# Patient Record
Sex: FEMALE | Race: WHITE | NOT HISPANIC OR LATINO | Employment: OTHER | RURAL
[De-identification: names, ages, dates, MRNs, and addresses within clinical notes are randomized per-mention and may not be internally consistent; named-entity substitution may affect disease eponyms.]

---

## 2017-01-16 ENCOUNTER — CONVERSION ENCOUNTER (OUTPATIENT)
Dept: FAMILY MEDICINE CLINIC | Facility: CLINIC | Age: 72
End: 2017-01-16

## 2017-01-23 ENCOUNTER — HOSPITAL ENCOUNTER (OUTPATIENT)
Dept: OTHER | Facility: HOSPITAL | Age: 72
Discharge: HOME | End: 2017-01-23
Attending: NURSE PRACTITIONER | Admitting: NURSE PRACTITIONER

## 2017-02-10 ENCOUNTER — HOSPITAL ENCOUNTER (OUTPATIENT)
Dept: OTHER | Facility: HOSPITAL | Age: 72
Discharge: HOME | End: 2017-02-10
Attending: NURSE PRACTITIONER | Admitting: NURSE PRACTITIONER

## 2017-02-13 ENCOUNTER — CONVERSION ENCOUNTER (OUTPATIENT)
Dept: FAMILY MEDICINE CLINIC | Facility: CLINIC | Age: 72
End: 2017-02-13

## 2017-03-08 ENCOUNTER — OFFICE (OUTPATIENT)
Dept: RURAL CLINIC 3 | Facility: CLINIC | Age: 72
End: 2017-03-08

## 2017-03-08 VITALS
HEIGHT: 62 IN | DIASTOLIC BLOOD PRESSURE: 73 MMHG | HEART RATE: 88 BPM | WEIGHT: 138 LBS | SYSTOLIC BLOOD PRESSURE: 136 MMHG

## 2017-03-08 DIAGNOSIS — K21.9 GASTRO-ESOPHAGEAL REFLUX DISEASE WITHOUT ESOPHAGITIS: ICD-10-CM

## 2017-03-08 DIAGNOSIS — R13.10 DYSPHAGIA, UNSPECIFIED: ICD-10-CM

## 2017-03-08 PROCEDURE — 99202 OFFICE O/P NEW SF 15 MIN: CPT | Performed by: INTERNAL MEDICINE

## 2017-03-08 RX ORDER — RANITIDINE 300 MG/1
300 TABLET ORAL
Qty: 90 | Refills: 4 | Status: COMPLETED
Start: 2017-03-08 | End: 2019-03-20 | Stop reason: ALTCHOICE

## 2017-03-08 RX ORDER — PANTOPRAZOLE SODIUM 40 MG/1
TABLET, DELAYED RELEASE ORAL
Qty: 90 | Refills: 4 | Status: COMPLETED
End: 2019-03-20 | Stop reason: ALTCHOICE

## 2017-03-08 RX ORDER — DEXLANSOPRAZOLE 60 MG/1
CAPSULE, DELAYED RELEASE ORAL
Qty: 30 | Refills: 0 | Status: COMPLETED
End: 2017-03-08

## 2017-04-19 ENCOUNTER — ON CAMPUS - OUTPATIENT (OUTPATIENT)
Dept: RURAL HOSPITAL 3 | Facility: HOSPITAL | Age: 72
End: 2017-04-19

## 2017-04-19 DIAGNOSIS — R13.10 DYSPHAGIA, UNSPECIFIED: ICD-10-CM

## 2017-04-19 DIAGNOSIS — K22.2 ESOPHAGEAL OBSTRUCTION: ICD-10-CM

## 2017-04-19 DIAGNOSIS — K21.9 GASTRO-ESOPHAGEAL REFLUX DISEASE WITHOUT ESOPHAGITIS: ICD-10-CM

## 2017-04-19 PROCEDURE — 43450 DILATE ESOPHAGUS 1/MULT PASS: CPT | Performed by: INTERNAL MEDICINE

## 2017-04-19 PROCEDURE — 43235 EGD DIAGNOSTIC BRUSH WASH: CPT | Performed by: INTERNAL MEDICINE

## 2017-09-18 ENCOUNTER — HOSPITAL ENCOUNTER (OUTPATIENT)
Dept: PHYSICAL THERAPY | Facility: HOSPITAL | Age: 72
Setting detail: RECURRING SERIES
Discharge: HOME | End: 2017-10-03
Attending: ORTHOPAEDIC SURGERY | Admitting: ORTHOPAEDIC SURGERY

## 2017-10-23 ENCOUNTER — CONVERSION ENCOUNTER (OUTPATIENT)
Dept: FAMILY MEDICINE CLINIC | Facility: CLINIC | Age: 72
End: 2017-10-23

## 2017-10-24 ENCOUNTER — HOSPITAL ENCOUNTER (OUTPATIENT)
Dept: OTHER | Facility: HOSPITAL | Age: 72
Discharge: HOME | End: 2017-10-24
Attending: ORTHOPAEDIC SURGERY | Admitting: ORTHOPAEDIC SURGERY

## 2017-12-22 ENCOUNTER — CONVERSION ENCOUNTER (OUTPATIENT)
Dept: FAMILY MEDICINE CLINIC | Facility: CLINIC | Age: 72
End: 2017-12-22

## 2018-01-08 ENCOUNTER — HOSPITAL ENCOUNTER (OUTPATIENT)
Dept: OTHER | Facility: HOSPITAL | Age: 73
Discharge: HOME | End: 2018-01-08
Attending: NURSE PRACTITIONER | Admitting: NURSE PRACTITIONER

## 2018-01-08 ENCOUNTER — CONVERSION ENCOUNTER (OUTPATIENT)
Dept: FAMILY MEDICINE CLINIC | Facility: CLINIC | Age: 73
End: 2018-01-08

## 2018-01-15 ENCOUNTER — CONVERSION ENCOUNTER (OUTPATIENT)
Dept: FAMILY MEDICINE CLINIC | Facility: CLINIC | Age: 73
End: 2018-01-15

## 2018-01-19 ENCOUNTER — CONVERSION ENCOUNTER (OUTPATIENT)
Dept: FAMILY MEDICINE CLINIC | Facility: CLINIC | Age: 73
End: 2018-01-19

## 2018-02-12 ENCOUNTER — CONVERSION ENCOUNTER (OUTPATIENT)
Dept: FAMILY MEDICINE CLINIC | Facility: CLINIC | Age: 73
End: 2018-02-12

## 2018-02-12 ENCOUNTER — HOSPITAL ENCOUNTER (OUTPATIENT)
Dept: OTHER | Facility: HOSPITAL | Age: 73
Discharge: HOME | End: 2018-02-12
Attending: NURSE PRACTITIONER | Admitting: NURSE PRACTITIONER

## 2018-02-14 ENCOUNTER — OFFICE (OUTPATIENT)
Dept: RURAL CLINIC 3 | Facility: CLINIC | Age: 73
End: 2018-02-14

## 2018-02-14 VITALS
DIASTOLIC BLOOD PRESSURE: 80 MMHG | SYSTOLIC BLOOD PRESSURE: 160 MMHG | HEIGHT: 62 IN | WEIGHT: 134 LBS | HEART RATE: 78 BPM

## 2018-02-14 DIAGNOSIS — K21.9 GASTRO-ESOPHAGEAL REFLUX DISEASE WITHOUT ESOPHAGITIS: ICD-10-CM

## 2018-02-14 PROCEDURE — 99213 OFFICE O/P EST LOW 20 MIN: CPT | Performed by: INTERNAL MEDICINE

## 2018-02-14 RX ORDER — PANTOPRAZOLE SODIUM 40 MG/1
TABLET, DELAYED RELEASE ORAL
Qty: 90 | Refills: 4 | Status: COMPLETED
End: 2019-03-20 | Stop reason: ALTCHOICE

## 2018-02-14 RX ORDER — RANITIDINE 300 MG/1
300 TABLET ORAL
Qty: 90 | Refills: 4 | Status: COMPLETED
Start: 2017-03-08 | End: 2019-03-20 | Stop reason: ALTCHOICE

## 2018-02-22 ENCOUNTER — HOSPITAL ENCOUNTER (OUTPATIENT)
Dept: OTHER | Facility: HOSPITAL | Age: 73
Discharge: HOME | End: 2018-02-22
Attending: NURSE PRACTITIONER | Admitting: NURSE PRACTITIONER

## 2018-04-19 ENCOUNTER — HOSPITAL ENCOUNTER (OUTPATIENT)
Dept: GENERAL RADIOLOGY | Facility: HOSPITAL | Age: 73
Discharge: HOME | End: 2018-04-19
Attending: NURSE PRACTITIONER | Admitting: NURSE PRACTITIONER

## 2018-08-21 ENCOUNTER — HOSPITAL ENCOUNTER (OUTPATIENT)
Dept: GENERAL RADIOLOGY | Facility: HOSPITAL | Age: 73
Discharge: HOME | End: 2018-08-21
Attending: ORTHOPAEDIC SURGERY | Admitting: ORTHOPAEDIC SURGERY

## 2018-08-24 ENCOUNTER — CONVERSION ENCOUNTER (OUTPATIENT)
Dept: FAMILY MEDICINE CLINIC | Facility: CLINIC | Age: 73
End: 2018-08-24

## 2018-08-30 ENCOUNTER — CONVERSION ENCOUNTER (OUTPATIENT)
Dept: FAMILY MEDICINE CLINIC | Facility: CLINIC | Age: 73
End: 2018-08-30

## 2018-09-25 ENCOUNTER — CONVERSION ENCOUNTER (OUTPATIENT)
Dept: FAMILY MEDICINE CLINIC | Facility: CLINIC | Age: 73
End: 2018-09-25

## 2018-10-17 ENCOUNTER — CONVERSION ENCOUNTER (OUTPATIENT)
Dept: FAMILY MEDICINE CLINIC | Facility: CLINIC | Age: 73
End: 2018-10-17

## 2018-12-24 ENCOUNTER — HOSPITAL ENCOUNTER (OUTPATIENT)
Dept: OTHER | Facility: HOSPITAL | Age: 73
Discharge: HOME | End: 2018-12-24
Attending: NURSE PRACTITIONER | Admitting: NURSE PRACTITIONER

## 2018-12-24 ENCOUNTER — CONVERSION ENCOUNTER (OUTPATIENT)
Dept: FAMILY MEDICINE CLINIC | Facility: CLINIC | Age: 73
End: 2018-12-24

## 2019-01-07 ENCOUNTER — CONVERSION ENCOUNTER (OUTPATIENT)
Dept: FAMILY MEDICINE CLINIC | Facility: CLINIC | Age: 74
End: 2019-01-07

## 2019-02-07 ENCOUNTER — HOSPITAL ENCOUNTER (OUTPATIENT)
Dept: OTHER | Facility: HOSPITAL | Age: 74
Discharge: HOME | End: 2019-02-07
Attending: NURSE PRACTITIONER | Admitting: NURSE PRACTITIONER

## 2019-02-07 LAB — CREAT BLDA-MCNC: 0.9 MG/DL (ref 0.6–1.3)

## 2019-02-14 ENCOUNTER — CONVERSION ENCOUNTER (OUTPATIENT)
Dept: FAMILY MEDICINE CLINIC | Facility: CLINIC | Age: 74
End: 2019-02-14

## 2019-02-14 ENCOUNTER — HOSPITAL ENCOUNTER (OUTPATIENT)
Dept: OTHER | Facility: HOSPITAL | Age: 74
Discharge: HOME | End: 2019-02-14
Attending: NURSE PRACTITIONER | Admitting: NURSE PRACTITIONER

## 2019-03-13 ENCOUNTER — OFFICE (OUTPATIENT)
Dept: RURAL CLINIC 3 | Facility: CLINIC | Age: 74
End: 2019-03-13

## 2019-03-13 VITALS
DIASTOLIC BLOOD PRESSURE: 65 MMHG | WEIGHT: 132 LBS | SYSTOLIC BLOOD PRESSURE: 114 MMHG | HEIGHT: 62 IN | HEART RATE: 79 BPM

## 2019-03-13 DIAGNOSIS — K21.9 GASTRO-ESOPHAGEAL REFLUX DISEASE WITHOUT ESOPHAGITIS: ICD-10-CM

## 2019-03-13 DIAGNOSIS — R13.10 DYSPHAGIA, UNSPECIFIED: ICD-10-CM

## 2019-03-13 PROCEDURE — 99214 OFFICE O/P EST MOD 30 MIN: CPT | Performed by: NURSE PRACTITIONER

## 2019-03-21 ENCOUNTER — CONVERSION ENCOUNTER (OUTPATIENT)
Dept: FAMILY MEDICINE CLINIC | Facility: CLINIC | Age: 74
End: 2019-03-21

## 2019-03-28 ENCOUNTER — HOSPITAL ENCOUNTER (OUTPATIENT)
Dept: RESPIRATORY THERAPY | Facility: HOSPITAL | Age: 74
Discharge: HOME | End: 2019-03-28
Attending: INTERNAL MEDICINE | Admitting: INTERNAL MEDICINE

## 2019-04-12 ENCOUNTER — HOSPITAL ENCOUNTER (OUTPATIENT)
Dept: RESPIRATORY THERAPY | Facility: HOSPITAL | Age: 74
Discharge: HOME | End: 2019-04-12
Attending: INTERNAL MEDICINE | Admitting: INTERNAL MEDICINE

## 2019-04-29 ENCOUNTER — HOSPITAL ENCOUNTER (OUTPATIENT)
Dept: RESPIRATORY THERAPY | Facility: HOSPITAL | Age: 74
Discharge: HOME | End: 2019-04-29
Attending: NURSE PRACTITIONER | Admitting: NURSE PRACTITIONER

## 2019-05-29 ENCOUNTER — ON CAMPUS - OUTPATIENT (OUTPATIENT)
Dept: RURAL HOSPITAL 3 | Facility: HOSPITAL | Age: 74
End: 2019-05-29

## 2019-05-29 DIAGNOSIS — K22.2 ESOPHAGEAL OBSTRUCTION: ICD-10-CM

## 2019-05-29 DIAGNOSIS — R13.10 DYSPHAGIA, UNSPECIFIED: ICD-10-CM

## 2019-05-29 PROCEDURE — 43235 EGD DIAGNOSTIC BRUSH WASH: CPT | Performed by: INTERNAL MEDICINE

## 2019-05-29 PROCEDURE — 43450 DILATE ESOPHAGUS 1/MULT PASS: CPT | Performed by: INTERNAL MEDICINE

## 2019-06-04 VITALS
DIASTOLIC BLOOD PRESSURE: 62 MMHG | OXYGEN SATURATION: 97 % | WEIGHT: 137.38 LBS | HEART RATE: 84 BPM | WEIGHT: 140.13 LBS | OXYGEN SATURATION: 97 % | SYSTOLIC BLOOD PRESSURE: 143 MMHG | DIASTOLIC BLOOD PRESSURE: 67 MMHG | RESPIRATION RATE: 16 BRPM | OXYGEN SATURATION: 99 % | WEIGHT: 135 LBS | WEIGHT: 134.2 LBS | HEIGHT: 63 IN | SYSTOLIC BLOOD PRESSURE: 138 MMHG | RESPIRATION RATE: 16 BRPM | WEIGHT: 141.2 LBS | WEIGHT: 139.5 LBS | BODY MASS INDEX: 23.78 KG/M2 | OXYGEN SATURATION: 99 % | BODY MASS INDEX: 24.8 KG/M2 | SYSTOLIC BLOOD PRESSURE: 145 MMHG | HEART RATE: 78 BPM | DIASTOLIC BLOOD PRESSURE: 80 MMHG | SYSTOLIC BLOOD PRESSURE: 125 MMHG | BODY MASS INDEX: 24.34 KG/M2 | BODY MASS INDEX: 24.98 KG/M2 | RESPIRATION RATE: 18 BRPM | BODY MASS INDEX: 23.92 KG/M2 | HEART RATE: 95 BPM | HEIGHT: 63 IN | HEIGHT: 63 IN | RESPIRATION RATE: 16 BRPM | DIASTOLIC BLOOD PRESSURE: 78 MMHG | DIASTOLIC BLOOD PRESSURE: 82 MMHG | HEART RATE: 96 BPM | BODY MASS INDEX: 24.77 KG/M2 | BODY MASS INDEX: 25.05 KG/M2 | RESPIRATION RATE: 18 BRPM | HEIGHT: 63 IN | SYSTOLIC BLOOD PRESSURE: 141 MMHG | OXYGEN SATURATION: 95 % | SYSTOLIC BLOOD PRESSURE: 113 MMHG | SYSTOLIC BLOOD PRESSURE: 138 MMHG | RESPIRATION RATE: 16 BRPM | HEIGHT: 63 IN | WEIGHT: 139 LBS | RESPIRATION RATE: 18 BRPM | HEART RATE: 90 BPM | SYSTOLIC BLOOD PRESSURE: 148 MMHG | OXYGEN SATURATION: 95 % | HEIGHT: 63 IN | HEART RATE: 100 BPM | HEIGHT: 63 IN | OXYGEN SATURATION: 98 % | HEART RATE: 88 BPM | SYSTOLIC BLOOD PRESSURE: 126 MMHG | WEIGHT: 141 LBS | RESPIRATION RATE: 18 BRPM | SYSTOLIC BLOOD PRESSURE: 136 MMHG | BODY MASS INDEX: 25.16 KG/M2 | DIASTOLIC BLOOD PRESSURE: 79 MMHG | HEART RATE: 88 BPM | WEIGHT: 139.8 LBS | HEART RATE: 80 BPM | RESPIRATION RATE: 16 BRPM | SYSTOLIC BLOOD PRESSURE: 134 MMHG | RESPIRATION RATE: 16 BRPM | RESPIRATION RATE: 16 BRPM | OXYGEN SATURATION: 100 % | BODY MASS INDEX: 24.82 KG/M2 | RESPIRATION RATE: 16 BRPM | HEIGHT: 63 IN | BODY MASS INDEX: 25.02 KG/M2 | OXYGEN SATURATION: 95 % | WEIGHT: 141.4 LBS | DIASTOLIC BLOOD PRESSURE: 85 MMHG | DIASTOLIC BLOOD PRESSURE: 86 MMHG | DIASTOLIC BLOOD PRESSURE: 77 MMHG | WEIGHT: 134.2 LBS | WEIGHT: 140 LBS | BODY MASS INDEX: 24.63 KG/M2 | DIASTOLIC BLOOD PRESSURE: 84 MMHG | HEART RATE: 77 BPM | OXYGEN SATURATION: 98 % | OXYGEN SATURATION: 99 % | HEART RATE: 77 BPM | SYSTOLIC BLOOD PRESSURE: 98 MMHG | HEART RATE: 75 BPM | OXYGEN SATURATION: 98 % | HEIGHT: 63 IN | WEIGHT: 142 LBS | DIASTOLIC BLOOD PRESSURE: 73 MMHG | SYSTOLIC BLOOD PRESSURE: 139 MMHG | BODY MASS INDEX: 24.71 KG/M2 | BODY MASS INDEX: 23.78 KG/M2 | DIASTOLIC BLOOD PRESSURE: 82 MMHG | HEIGHT: 63 IN | DIASTOLIC BLOOD PRESSURE: 76 MMHG | DIASTOLIC BLOOD PRESSURE: 69 MMHG | HEART RATE: 77 BPM | RESPIRATION RATE: 16 BRPM | OXYGEN SATURATION: 98 % | SYSTOLIC BLOOD PRESSURE: 96 MMHG | HEIGHT: 63 IN

## 2019-09-19 PROBLEM — I65.29 CAROTID ARTERY STENOSIS: Status: ACTIVE | Noted: 2017-01-23

## 2019-09-19 PROBLEM — J45.909 ASTHMA: Status: ACTIVE | Noted: 2019-09-19

## 2019-09-19 PROBLEM — M15.9 GENERALIZED OSTEOARTHRITIS: Status: ACTIVE | Noted: 2019-09-19

## 2019-09-19 PROBLEM — N81.10 FEMALE BLADDER PROLAPSE: Status: ACTIVE | Noted: 2019-02-14

## 2019-09-19 PROBLEM — E78.5 HYPERLIPIDEMIA: Status: ACTIVE | Noted: 2019-09-19

## 2019-09-19 PROBLEM — E53.8 VITAMIN B12 DEFICIENCY: Status: ACTIVE | Noted: 2019-02-08

## 2019-09-19 PROBLEM — I10 BENIGN HYPERTENSION: Status: ACTIVE | Noted: 2019-09-19

## 2019-09-19 PROBLEM — J30.9 ALLERGIC RHINITIS: Status: ACTIVE | Noted: 2019-09-19

## 2019-09-19 PROBLEM — G47.00 INSOMNIA: Status: ACTIVE | Noted: 2019-02-14

## 2019-09-19 PROBLEM — K21.9 GASTROESOPHAGEAL REFLUX DISEASE: Status: ACTIVE | Noted: 2019-09-19

## 2019-09-19 PROBLEM — N39.3 STRESS INCONTINENCE, FEMALE: Status: ACTIVE | Noted: 2019-02-14

## 2019-09-19 PROBLEM — Z79.899 OTHER LONG TERM (CURRENT) DRUG THERAPY: Status: ACTIVE | Noted: 2019-01-29

## 2019-09-19 PROBLEM — M85.80 OSTEOPENIA: Status: ACTIVE | Noted: 2019-09-19

## 2019-09-19 PROBLEM — M19.90 OSTEOARTHRITIS: Status: ACTIVE | Noted: 2019-09-19

## 2019-09-19 RX ORDER — VALSARTAN 80 MG/1
TABLET ORAL DAILY
COMMUNITY
Start: 2013-10-30 | End: 2019-11-27 | Stop reason: ALTCHOICE

## 2019-09-19 RX ORDER — HYDROCHLOROTHIAZIDE 25 MG/1
TABLET ORAL EVERY 24 HOURS
COMMUNITY
Start: 2013-09-06 | End: 2019-09-24 | Stop reason: SDUPTHER

## 2019-09-19 RX ORDER — LOSARTAN POTASSIUM 100 MG/1
TABLET ORAL
COMMUNITY
Start: 2018-10-23 | End: 2019-12-10 | Stop reason: SDUPTHER

## 2019-09-19 RX ORDER — FEXOFENADINE HCL 180 MG/1
TABLET ORAL
COMMUNITY
Start: 2018-02-12 | End: 2019-11-27 | Stop reason: ALTCHOICE

## 2019-09-19 RX ORDER — ACETAMINOPHEN 325 MG/1
TABLET ORAL
COMMUNITY

## 2019-09-19 RX ORDER — PHENOL 1.4 %
AEROSOL, SPRAY (ML) MUCOUS MEMBRANE DAILY
COMMUNITY
Start: 2013-01-21 | End: 2020-02-20

## 2019-09-19 RX ORDER — LEVALBUTEROL INHALATION SOLUTION 1.25 MG/3ML
SOLUTION RESPIRATORY (INHALATION)
COMMUNITY
Start: 2013-06-06 | End: 2020-02-20

## 2019-09-19 RX ORDER — TRIAMCINOLONE ACETONIDE 55 UG/1
SPRAY, METERED NASAL
COMMUNITY
Start: 2019-02-14 | End: 2020-02-20

## 2019-09-19 RX ORDER — RISEDRONATE SODIUM 150 MG/1
TABLET, FILM COATED ORAL
COMMUNITY
Start: 2018-12-05 | End: 2020-02-12

## 2019-09-19 RX ORDER — GUAIFENESIN 600 MG/1
1200 TABLET, EXTENDED RELEASE ORAL 2 TIMES DAILY
COMMUNITY
Start: 2013-01-21

## 2019-09-19 RX ORDER — FAMCICLOVIR 500 MG/1
TABLET ORAL
COMMUNITY
Start: 2015-01-20 | End: 2019-11-27

## 2019-09-19 RX ORDER — LANOLIN ALCOHOL/MO/W.PET/CERES
CREAM (GRAM) TOPICAL EVERY 24 HOURS
COMMUNITY
Start: 2019-02-14 | End: 2021-04-14

## 2019-09-19 RX ORDER — MULTIVIT WITH MINERALS/LUTEIN
TABLET ORAL
COMMUNITY
Start: 2018-02-12

## 2019-09-19 RX ORDER — LEVALBUTEROL TARTRATE 45 UG/1
AEROSOL, METERED ORAL
COMMUNITY
Start: 2013-06-06

## 2019-09-19 RX ORDER — MONTELUKAST SODIUM 10 MG/1
TABLET ORAL EVERY 24 HOURS
COMMUNITY
Start: 2019-02-14 | End: 2020-02-20 | Stop reason: SDUPTHER

## 2019-09-19 RX ORDER — SIMVASTATIN 40 MG
TABLET ORAL
COMMUNITY
Start: 2013-01-21 | End: 2019-12-11 | Stop reason: SDUPTHER

## 2019-09-19 RX ORDER — LEVOCETIRIZINE DIHYDROCHLORIDE 5 MG/1
TABLET, FILM COATED ORAL EVERY 24 HOURS
COMMUNITY
Start: 2019-02-14 | End: 2020-10-22

## 2019-09-20 ENCOUNTER — OFFICE VISIT (OUTPATIENT)
Dept: FAMILY MEDICINE CLINIC | Facility: CLINIC | Age: 74
End: 2019-09-20

## 2019-09-20 VITALS
SYSTOLIC BLOOD PRESSURE: 124 MMHG | WEIGHT: 144.4 LBS | TEMPERATURE: 98.5 F | BODY MASS INDEX: 26.57 KG/M2 | HEIGHT: 62 IN | DIASTOLIC BLOOD PRESSURE: 68 MMHG | HEART RATE: 74 BPM | RESPIRATION RATE: 16 BRPM | OXYGEN SATURATION: 97 %

## 2019-09-20 DIAGNOSIS — I10 BENIGN HYPERTENSION: Primary | ICD-10-CM

## 2019-09-20 PROCEDURE — 99212 OFFICE O/P EST SF 10 MIN: CPT | Performed by: NURSE PRACTITIONER

## 2019-09-20 RX ORDER — TIOTROPIUM BROMIDE INHALATION SPRAY 1.56 UG/1
SPRAY, METERED RESPIRATORY (INHALATION)
COMMUNITY
Start: 2019-09-10 | End: 2020-04-03 | Stop reason: SDUPTHER

## 2019-09-20 RX ORDER — RANITIDINE 300 MG/1
TABLET ORAL
COMMUNITY
Start: 2019-08-21 | End: 2019-11-27

## 2019-09-20 RX ORDER — AZITHROMYCIN 500 MG/1
TABLET, FILM COATED ORAL
COMMUNITY
Start: 2019-08-30 | End: 2019-11-27

## 2019-09-20 NOTE — PROGRESS NOTES
Chief Complaint   Patient presents with   • Hypertension        Subjective     Cara Castañeda  has a past medical history of Allergic rhinitis, Asthma, Carotid stenosis, Cold sore, Gallstone pancreatitis (2006), GERD (gastroesophageal reflux disease), Hiatal hernia, Hyperlipidemia, Hypertension, Left ventricular hypertrophy, Osteoarthritis, Osteopenia with high risk of fracture, Pre-diabetes, and Vocal cord nodule.    Hypertension   This is a chronic problem. The current episode started more than 1 year ago. The problem has been gradually improving since onset. The problem is controlled. Pertinent negatives include no blurred vision, chest pain, palpitations or shortness of breath. Current antihypertension treatment includes angiotensin blockers and diuretics. The current treatment provides significant improvement.       PHQ-2 Depression Screening  Little interest or pleasure in doing things? 0   Feeling down, depressed, or hopeless? 0   PHQ-2 Total Score 0       Allergies   Allergen Reactions   • Aspirin Unknown (See Comments)   • Sulfa Antibiotics Unknown (See Comments)   • Tolterodine Nausea Only   • Albuterol Sulfate Unknown (See Comments)   • Atorvastatin Calcium Nausea Only       Current Outpatient Medications:   •  acetaminophen (TYLENOL) 325 MG tablet, Take  by mouth., Disp: , Rfl:   •  azithromycin (ZITHROMAX) 500 MG tablet, , Disp: , Rfl:   •  Calcium Carbonate-Vitamin D3 (CALCIUM 600-D) 600-400 MG-UNIT tablet, CALCIUM 600-D 600-400 MG-UNIT TABS, Disp: , Rfl:   •  Dexlansoprazole (DEXILANT PO), DEXILANT CPDR, Disp: , Rfl:   •  fexofenadine (ALLEGRA) 180 MG tablet, MUCINEX ALLERGY TABLET, Disp: , Rfl:   •  hydrochlorothiazide (HYDRODIURIL) 25 MG tablet, Daily., Disp: , Rfl:   •  levalbuterol (XOPENEX HFA) 45 MCG/ACT inhaler, XOPENEX HFA 45 MCG/ACT AERO, Disp: , Rfl:   •  levalbuterol (XOPENEX) 1.25 MG/3ML nebulizer solution, XOPENEX 1.25 MG/3ML NEBU, Disp: , Rfl:   •  levocetirizine (XYZAL ALLERGY 24HR) 5  MG tablet, Daily., Disp: , Rfl:   •  losartan (COZAAR) 100 MG tablet, LOSARTAN POTASSIUM 100 MG TABS, Disp: , Rfl:   •  mometasone-formoterol (DULERA) 100-5 MCG/ACT inhaler, DULERA 100-5 MCG/ACT AERO, Disp: , Rfl:   •  montelukast (SINGULAIR) 10 MG tablet, Daily., Disp: , Rfl:   •  Multiple Vitamins-Minerals (CENTRUM SILVER) tablet, CENTRUM SILVER TABS, Disp: , Rfl:   •  raNITIdine (ZANTAC) 300 MG tablet, , Disp: , Rfl:   •  risedronate (ACTONEL) 150 MG tablet, ACTONEL 150 MG TABS, Disp: , Rfl:   •  simvastatin (ZOCOR) 40 MG tablet, SIMVASTATIN 40 MG TABS, Disp: , Rfl:   •  SPIRIVA RESPIMAT 1.25 MCG/ACT aerosol solution inhaler, , Disp: , Rfl:   •  Triamcinolone Acetonide (NASACORT ALLERGY 24HR) 55 MCG/ACT nasal inhaler, NASACORT ALLERGY 24HR 55 MCG/ACT AERO, Disp: , Rfl:   •  valsartan (DIOVAN) 80 MG tablet, Take  by mouth Daily., Disp: , Rfl:   •  vitamin B-12 (CYANOCOBALAMIN) 1000 MCG tablet, Daily., Disp: , Rfl:   •  calcium carbonate (CALCIUM 600) 600 MG tablet, Take  by mouth Daily., Disp: , Rfl:   •  famciclovir (FAMVIR) 500 MG tablet, FAMCICLOVIR 500 MG TABS, Disp: , Rfl:   •  guaiFENesin (MUCINEX) 600 MG 12 hr tablet, Take  by mouth., Disp: , Rfl:   •  Pediatric Multivitamins-Fl (MULTIVITAMINS/FL PO), Take  by mouth Daily., Disp: , Rfl:     Past Medical History:   Diagnosis Date   • Allergic rhinitis     on immunotherapy- Dr. Benito    • Asthma     Dr. Benito   • Carotid stenosis     <50%   • Cold sore    • Gallstone pancreatitis 2006   • GERD (gastroesophageal reflux disease)     Dr. Oliva   • Hiatal hernia     small, sliding    • Hyperlipidemia    • Hypertension    • Left ventricular hypertrophy    • Osteoarthritis     Dr. Avila   • Osteopenia with high risk of fracture    • Pre-diabetes    • Vocal cord nodule     Dr. Nieves       Past Surgical History:   Procedure Laterality Date   • APPENDECTOMY     • CHOLECYSTECTOMY  05/2006    Dr. Hernandez   • ENDOSCOPY  2017    & Dilation. Dr. lOiva   • ERCP   05/2006    Dr. Oliva   • HYSTERECTOMY  1974    ovaries remain, for benign reasons   • OTHER SURGICAL HISTORY  2009    Stress Echo. No ischemia. EF 60-65%.    • TONSILLECTOMY AND ADENOIDECTOMY     • TOTAL KNEE ARTHROPLASTY  08/31/2017       Social History     Socioeconomic History   • Marital status:      Spouse name: Not on file   • Number of children: Not on file   • Years of education: Not on file   • Highest education level: Not on file   Tobacco Use   • Smoking status: Never Smoker   • Smokeless tobacco: Never Used   Substance and Sexual Activity   • Alcohol use: No     Frequency: Never   • Drug use: No       Family History   Problem Relation Age of Onset   • Hypertension Mother    • Hypertension Father    • Heart disease Father    • Prostate cancer Paternal Grandfather        Family history, surgical history, past medical history, Allergies and med's reviewed with patient today and updated in [employer] EMR.     ROS:  Review of Systems   Constitutional: Negative for activity change, appetite change, diaphoresis, fatigue, unexpected weight gain and unexpected weight loss.   Eyes: Negative for blurred vision and visual disturbance.   Respiratory: Negative for cough, shortness of breath and wheezing.    Cardiovascular: Negative for chest pain, palpitations and leg swelling.   Gastrointestinal: Negative for abdominal pain, constipation, diarrhea, nausea and vomiting.   Endocrine: Negative for cold intolerance and heat intolerance.   Genitourinary: Negative for frequency.   Musculoskeletal: Negative for myalgias.   Neurological: Negative for dizziness, syncope and headache.   Psychiatric/Behavioral: Negative for depressed mood.       OBJECTIVE:  Vitals:    09/20/19 0954 09/20/19 1015   BP: 148/75 124/68   BP Location: Right arm    Patient Position: Sitting    Cuff Size: Adult    Pulse: 74    Resp: 16    Temp: 98.5 °F (36.9 °C)    TempSrc: Oral    SpO2: 97%    Weight: 65.5 kg (144 lb 6.4 oz)    Height: 157.5 cm  "(62\")      Body mass index is 26.41 kg/m².    Physical Exam   Constitutional: She is oriented to person, place, and time. She appears well-developed and well-nourished.   Neck: Trachea normal and normal range of motion. Neck supple. Carotid bruit is not present. No thyromegaly present.   Cardiovascular: Normal rate, regular rhythm, normal heart sounds and intact distal pulses. Exam reveals no gallop and no friction rub.   No murmur heard.  Pulmonary/Chest: Effort normal and breath sounds normal. She has no wheezes. She has no rales.   Abdominal: Soft. Bowel sounds are normal. There is no hepatosplenomegaly. No hernia.   Musculoskeletal: Normal range of motion. She exhibits no edema.   Lymphadenopathy:     She has no cervical adenopathy.   Neurological: She is alert and oriented to person, place, and time.   Skin: Skin is warm and dry.   Psychiatric: She has a normal mood and affect. Her behavior is normal. Judgment and thought content normal.       No visits with results within 30 Day(s) from this visit.   Latest known visit with results is:   Hospital Outpatient Visit on 02/07/2019   Component Date Value Ref Range Status   • Creatinine, Arterial 02/07/2019 0.9  0.6 - 1.3 mg/dL Final   • GFR MDRD Non  02/07/2019 >60  >60 mL/min/1.73m2 Final   • eGFR African Am 02/07/2019 >60  >60 mL/min/1.73m2 Final       ASSESSMENT/ PLAN:    Diagnoses and all orders for this visit:    1. Benign hypertension (Primary)  Comments:  Stable. Follow-up in February for annual exam.         Orders Placed Today:     No orders of the defined types were placed in this encounter.       Management Plan:     An After Visit Summary was printed and given to the patient at discharge.    Follow-up: Return in about 5 months (around 2/17/2020) for Medicare Wellness.    AGNES Reilly 9/22/2019 11:30 AM  This note was electronically signed.  "

## 2019-09-24 RX ORDER — HYDROCHLOROTHIAZIDE 25 MG/1
TABLET ORAL
Qty: 90 TABLET | Refills: 1 | Status: SHIPPED | OUTPATIENT
Start: 2019-09-24 | End: 2020-03-27

## 2019-10-17 ENCOUNTER — FLU SHOT (OUTPATIENT)
Dept: FAMILY MEDICINE CLINIC | Facility: CLINIC | Age: 74
End: 2019-10-17

## 2019-10-17 DIAGNOSIS — Z23 IMMUNIZATION, PNEUMOCOCCUS AND INFLUENZA: ICD-10-CM

## 2019-10-17 PROCEDURE — G0008 ADMIN INFLUENZA VIRUS VAC: HCPCS | Performed by: NURSE PRACTITIONER

## 2019-10-17 PROCEDURE — 90653 IIV ADJUVANT VACCINE IM: CPT | Performed by: NURSE PRACTITIONER

## 2019-11-08 DIAGNOSIS — R73.02 IMPAIRED GLUCOSE TOLERANCE: ICD-10-CM

## 2019-11-08 DIAGNOSIS — E78.5 HYPERLIPIDEMIA, UNSPECIFIED HYPERLIPIDEMIA TYPE: ICD-10-CM

## 2019-11-08 DIAGNOSIS — I10 BENIGN HYPERTENSION: Primary | ICD-10-CM

## 2019-11-14 LAB
ALBUMIN SERPL-MCNC: 4.5 G/DL (ref 3.5–4.8)
ALBUMIN/GLOB SERPL: 1.9 {RATIO} (ref 1.2–2.2)
ALP SERPL-CCNC: 65 IU/L (ref 39–117)
ALT SERPL-CCNC: 13 IU/L (ref 0–32)
AST SERPL-CCNC: 17 IU/L (ref 0–40)
BASOPHILS # BLD AUTO: 0.1 X10E3/UL (ref 0–0.2)
BASOPHILS NFR BLD AUTO: 1 %
BILIRUB SERPL-MCNC: 0.3 MG/DL (ref 0–1.2)
BUN SERPL-MCNC: 12 MG/DL (ref 8–27)
BUN/CREAT SERPL: 14 (ref 12–28)
CALCIUM SERPL-MCNC: 10.2 MG/DL (ref 8.7–10.3)
CHLORIDE SERPL-SCNC: 99 MMOL/L (ref 96–106)
CO2 SERPL-SCNC: 25 MMOL/L (ref 20–29)
CREAT SERPL-MCNC: 0.83 MG/DL (ref 0.57–1)
EOSINOPHIL # BLD AUTO: 0.3 X10E3/UL (ref 0–0.4)
EOSINOPHIL NFR BLD AUTO: 4 %
ERYTHROCYTE [DISTWIDTH] IN BLOOD BY AUTOMATED COUNT: 13.6 % (ref 12.3–15.4)
GLOBULIN SER CALC-MCNC: 2.4 G/DL (ref 1.5–4.5)
GLUCOSE SERPL-MCNC: 103 MG/DL (ref 65–99)
HBA1C MFR BLD: 5.7 % (ref 4.8–5.6)
HCT VFR BLD AUTO: 42.1 % (ref 34–46.6)
HGB BLD-MCNC: 13.6 G/DL (ref 11.1–15.9)
IMM GRANULOCYTES # BLD AUTO: 0 X10E3/UL (ref 0–0.1)
IMM GRANULOCYTES NFR BLD AUTO: 0 %
LYMPHOCYTES # BLD AUTO: 1.8 X10E3/UL (ref 0.7–3.1)
LYMPHOCYTES NFR BLD AUTO: 25 %
MCH RBC QN AUTO: 27.9 PG (ref 26.6–33)
MCHC RBC AUTO-ENTMCNC: 32.3 G/DL (ref 31.5–35.7)
MCV RBC AUTO: 86 FL (ref 79–97)
MONOCYTES # BLD AUTO: 0.7 X10E3/UL (ref 0.1–0.9)
MONOCYTES NFR BLD AUTO: 9 %
NEUTROPHILS # BLD AUTO: 4.2 X10E3/UL (ref 1.4–7)
NEUTROPHILS NFR BLD AUTO: 61 %
PLATELET # BLD AUTO: 398 X10E3/UL (ref 150–450)
POTASSIUM SERPL-SCNC: 3.8 MMOL/L (ref 3.5–5.2)
PROT SERPL-MCNC: 6.9 G/DL (ref 6–8.5)
RBC # BLD AUTO: 4.87 X10E6/UL (ref 3.77–5.28)
SODIUM SERPL-SCNC: 138 MMOL/L (ref 134–144)
WBC # BLD AUTO: 7 X10E3/UL (ref 3.4–10.8)

## 2019-11-27 ENCOUNTER — OFFICE VISIT (OUTPATIENT)
Dept: PULMONOLOGY | Facility: HOSPITAL | Age: 74
End: 2019-11-27

## 2019-11-27 VITALS
WEIGHT: 144 LBS | BODY MASS INDEX: 26.5 KG/M2 | OXYGEN SATURATION: 95 % | DIASTOLIC BLOOD PRESSURE: 77 MMHG | SYSTOLIC BLOOD PRESSURE: 128 MMHG | HEIGHT: 62 IN | HEART RATE: 72 BPM

## 2019-11-27 DIAGNOSIS — J30.9 ALLERGIC RHINITIS, UNSPECIFIED SEASONALITY, UNSPECIFIED TRIGGER: ICD-10-CM

## 2019-11-27 DIAGNOSIS — J45.20 MILD INTERMITTENT ASTHMA WITHOUT COMPLICATION: Primary | ICD-10-CM

## 2019-11-27 PROCEDURE — G0463 HOSPITAL OUTPT CLINIC VISIT: HCPCS

## 2019-11-27 NOTE — PROGRESS NOTES
SUBJECTIVE    DHEREAJ FABIAN is a  74 y.o. female      Here today for follow up.  Last office visit was 6 months ago with Maira Drake NP.  Pt without recent hospitalization or exacerbation.  States doing well overall.  She is taking Dulera and Spiriva daily as directed and feels like this combination works well for her.  She does have environmental allergies and still takes allergy shots in addition to xyzal and singulair daily.  Reports rare JAMILA use.           Review of Systems   Constitutional: Negative.    HENT: Positive for postnasal drip.         Wears dentures, glasses.    Eyes: Negative.    Respiratory: Positive for cough and wheezing.    Cardiovascular: Negative.    Gastrointestinal: Negative.    Endocrine: Negative.    Genitourinary: Positive for frequency.   Musculoskeletal: Positive for myalgias.   Skin: Negative.    Allergic/Immunologic: Negative.    Neurological: Negative.    Hematological: Negative.    Psychiatric/Behavioral: Negative.        Vitals:    11/27/19 1124   BP: 128/77   Pulse: 72   SpO2: 95%         Current Outpatient Medications:   •  acetaminophen (TYLENOL) 325 MG tablet, Take  by mouth., Disp: , Rfl:   •  calcium carbonate (CALCIUM 600) 600 MG tablet, Take  by mouth Daily., Disp: , Rfl:   •  Calcium Carbonate-Vitamin D3 (CALCIUM 600-D) 600-400 MG-UNIT tablet, CALCIUM 600-D 600-400 MG-UNIT TABS, Disp: , Rfl:   •  guaiFENesin (MUCINEX) 600 MG 12 hr tablet, Take  by mouth., Disp: , Rfl:   •  hydroCHLOROthiazide (HYDRODIURIL) 25 MG tablet, TAKE ONE TABLET BY MOUTH ONCE DAILY, Disp: 90 tablet, Rfl: 1  •  levalbuterol (XOPENEX HFA) 45 MCG/ACT inhaler, XOPENEX HFA 45 MCG/ACT AERO, Disp: , Rfl:   •  levalbuterol (XOPENEX) 1.25 MG/3ML nebulizer solution, XOPENEX 1.25 MG/3ML NEBU, Disp: , Rfl:   •  levocetirizine (XYZAL ALLERGY 24HR) 5 MG tablet, Daily., Disp: , Rfl:   •  losartan (COZAAR) 100 MG tablet, LOSARTAN POTASSIUM 100 MG TABS, Disp: , Rfl:   •  mometasone-formoterol (DULERA) 100-5  MCG/ACT inhaler, DULERA 100-5 MCG/ACT AERO, Disp: , Rfl:   •  montelukast (SINGULAIR) 10 MG tablet, Daily., Disp: , Rfl:   •  Multiple Vitamins-Minerals (CENTRUM SILVER) tablet, CENTRUM SILVER TABS, Disp: , Rfl:   •  Pediatric Multivitamins-Fl (MULTIVITAMINS/FL PO), Take  by mouth Daily., Disp: , Rfl:   •  risedronate (ACTONEL) 150 MG tablet, ACTONEL 150 MG TABS, Disp: , Rfl:   •  simvastatin (ZOCOR) 40 MG tablet, SIMVASTATIN 40 MG TABS, Disp: , Rfl:   •  SPIRIVA RESPIMAT 1.25 MCG/ACT aerosol solution inhaler, , Disp: , Rfl:   •  Triamcinolone Acetonide (NASACORT ALLERGY 24HR) 55 MCG/ACT nasal inhaler, NASACORT ALLERGY 24HR 55 MCG/ACT AERO, Disp: , Rfl:   •  vitamin B-12 (CYANOCOBALAMIN) 1000 MCG tablet, Daily., Disp: , Rfl:       OBJECTIVE    Physical Exam   Constitutional: She is oriented to person, place, and time. She appears well-developed and well-nourished.   HENT:   Head: Normocephalic and atraumatic.   Eyes: Conjunctivae are normal.   Neck: Normal range of motion. Neck supple.   Cardiovascular: Normal rate, regular rhythm and normal heart sounds.   Pulmonary/Chest: Effort normal and breath sounds normal.   Musculoskeletal: Normal range of motion.   Neurological: She is alert and oriented to person, place, and time.   Skin: Skin is warm and dry. Capillary refill takes less than 2 seconds.   Psychiatric: She has a normal mood and affect.   Nursing note and vitals reviewed.        ASSESSMENT AND PLAN    Diagnoses and all orders for this visit:    1. Mild intermittent asthma without complication (Primary)  Assessment & Plan:  Asthma is stable.   Doing well on current regimen of dulera and spiriva.  Advised to continue.           2. Allergic rhinitis, unspecified seasonality, unspecified trigger

## 2019-11-27 NOTE — ASSESSMENT & PLAN NOTE
Asthma is stable.   Doing well on current regimen of dulera and spiriva.  Advised to continue.

## 2019-12-11 RX ORDER — LOSARTAN POTASSIUM 100 MG/1
TABLET ORAL
Qty: 90 TABLET | Refills: 1 | Status: SHIPPED | OUTPATIENT
Start: 2019-12-11 | End: 2020-06-05

## 2019-12-11 RX ORDER — SIMVASTATIN 40 MG
TABLET ORAL
Qty: 90 TABLET | Refills: 1 | Status: SHIPPED | OUTPATIENT
Start: 2019-12-11 | End: 2020-09-15

## 2020-02-12 RX ORDER — RISEDRONATE SODIUM 150 MG/1
TABLET, FILM COATED ORAL
Qty: 3 TABLET | Refills: 3 | Status: SHIPPED | OUTPATIENT
Start: 2020-02-12 | End: 2021-02-10

## 2020-02-20 ENCOUNTER — OFFICE VISIT (OUTPATIENT)
Dept: FAMILY MEDICINE CLINIC | Facility: CLINIC | Age: 75
End: 2020-02-20

## 2020-02-20 VITALS
RESPIRATION RATE: 16 BRPM | DIASTOLIC BLOOD PRESSURE: 70 MMHG | HEIGHT: 62 IN | BODY MASS INDEX: 26.31 KG/M2 | HEART RATE: 88 BPM | SYSTOLIC BLOOD PRESSURE: 154 MMHG | WEIGHT: 143 LBS | OXYGEN SATURATION: 96 % | TEMPERATURE: 97.4 F

## 2020-02-20 DIAGNOSIS — E78.2 MIXED HYPERLIPIDEMIA: ICD-10-CM

## 2020-02-20 DIAGNOSIS — F51.01 PRIMARY INSOMNIA: ICD-10-CM

## 2020-02-20 DIAGNOSIS — Z00.01 ENCOUNTER FOR GENERAL ADULT MEDICAL EXAMINATION WITH ABNORMAL FINDINGS: Primary | ICD-10-CM

## 2020-02-20 DIAGNOSIS — J45.20 MILD INTERMITTENT ASTHMA WITHOUT COMPLICATION: ICD-10-CM

## 2020-02-20 DIAGNOSIS — E53.8 VITAMIN B12 DEFICIENCY: ICD-10-CM

## 2020-02-20 DIAGNOSIS — N81.10 CYSTOCELE WITH RECTOCELE: ICD-10-CM

## 2020-02-20 DIAGNOSIS — R73.03 PREDIABETES: ICD-10-CM

## 2020-02-20 DIAGNOSIS — M85.89 OSTEOPENIA OF MULTIPLE SITES: ICD-10-CM

## 2020-02-20 DIAGNOSIS — N39.46 MIXED STRESS AND URGE URINARY INCONTINENCE: ICD-10-CM

## 2020-02-20 DIAGNOSIS — J30.9 ALLERGIC RHINITIS, UNSPECIFIED SEASONALITY, UNSPECIFIED TRIGGER: ICD-10-CM

## 2020-02-20 DIAGNOSIS — M15.9 GENERALIZED OSTEOARTHRITIS: ICD-10-CM

## 2020-02-20 DIAGNOSIS — I10 BENIGN HYPERTENSION: ICD-10-CM

## 2020-02-20 DIAGNOSIS — R07.9 CHEST PAIN, UNSPECIFIED TYPE: ICD-10-CM

## 2020-02-20 DIAGNOSIS — K21.9 GASTROESOPHAGEAL REFLUX DISEASE WITHOUT ESOPHAGITIS: ICD-10-CM

## 2020-02-20 DIAGNOSIS — N81.6 CYSTOCELE WITH RECTOCELE: ICD-10-CM

## 2020-02-20 DIAGNOSIS — R09.89 BRUIT OF LEFT CAROTID ARTERY: ICD-10-CM

## 2020-02-20 DIAGNOSIS — I11.9 HYPERTENSIVE HEART DISEASE WITHOUT HEART FAILURE: ICD-10-CM

## 2020-02-20 DIAGNOSIS — Z12.31 BREAST CANCER SCREENING BY MAMMOGRAM: ICD-10-CM

## 2020-02-20 PROBLEM — H52.4 PRESBYOPIA: Status: RESOLVED | Noted: 2020-02-20 | Resolved: 2020-02-20

## 2020-02-20 PROBLEM — I65.29 CAROTID ARTERY STENOSIS: Status: RESOLVED | Noted: 2017-01-23 | Resolved: 2020-02-20

## 2020-02-20 PROBLEM — M19.90 OSTEOARTHRITIS: Status: RESOLVED | Noted: 2019-09-19 | Resolved: 2020-02-20

## 2020-02-20 PROBLEM — H04.123 DRY EYE SYNDROME OF BILATERAL LACRIMAL GLANDS: Status: ACTIVE | Noted: 2020-02-20

## 2020-02-20 PROBLEM — H35.373 PUCKERING OF MACULA, BILATERAL: Status: ACTIVE | Noted: 2020-02-20

## 2020-02-20 PROBLEM — H35.372 EPIRETINAL MEMBRANE (ERM) OF LEFT EYE: Status: ACTIVE | Noted: 2017-03-20

## 2020-02-20 PROBLEM — Z96.1 PRESENCE OF INTRAOCULAR LENS: Status: ACTIVE | Noted: 2020-02-20

## 2020-02-20 PROBLEM — H04.129 TEAR FILM INSUFFICIENCY: Status: ACTIVE | Noted: 2017-03-20

## 2020-02-20 PROBLEM — Z96.1 BILATERAL PSEUDOPHAKIA: Status: ACTIVE | Noted: 2020-02-20

## 2020-02-20 PROBLEM — H52.4 PRESBYOPIA: Status: ACTIVE | Noted: 2020-02-20

## 2020-02-20 PROBLEM — Z96.1 PSEUDOPHAKIA: Status: ACTIVE | Noted: 2017-03-20

## 2020-02-20 PROCEDURE — 93000 ELECTROCARDIOGRAM COMPLETE: CPT | Performed by: NURSE PRACTITIONER

## 2020-02-20 PROCEDURE — G0439 PPPS, SUBSEQ VISIT: HCPCS | Performed by: NURSE PRACTITIONER

## 2020-02-20 PROCEDURE — 99214 OFFICE O/P EST MOD 30 MIN: CPT | Performed by: NURSE PRACTITIONER

## 2020-02-20 RX ORDER — DEXLANSOPRAZOLE 60 MG/1
CAPSULE, DELAYED RELEASE ORAL
COMMUNITY
End: 2020-02-20

## 2020-02-20 RX ORDER — MONTELUKAST SODIUM 10 MG/1
10 TABLET ORAL EVERY 24 HOURS
Qty: 90 TABLET | Refills: 3 | Status: SHIPPED | OUTPATIENT
Start: 2020-02-20 | End: 2021-02-10

## 2020-02-20 RX ORDER — PANTOPRAZOLE SODIUM 40 MG/1
40 TABLET, DELAYED RELEASE ORAL DAILY
COMMUNITY

## 2020-02-20 NOTE — PROGRESS NOTES
Medicare Annual Wellness Visit  Chief Complaint   Patient presents with   • Medicare Wellness-subsequent   • Hypertension   • Hyperlipidemia   • Chest Pain   • Blood Sugar Problem   • Insomnia       Subjective     Cara Castañeda is a 74 y.o. female who presents for an Annual Wellness Visit. In addition, we addressed the following health issues:    Hypertension   This is a chronic problem. The current episode started more than 1 year ago. The problem has been gradually worsening since onset. The problem is uncontrolled. Associated symptoms include chest pain and malaise/fatigue. Pertinent negatives include no blurred vision, neck pain, palpitations or shortness of breath. Risk factors for coronary artery disease include family history, dyslipidemia, post-menopausal state, sedentary lifestyle and stress. Current antihypertension treatment includes angiotensin blockers and diuretics. The current treatment provides moderate improvement. Compliance problems include exercise.    Hyperlipidemia   This is a chronic problem. The current episode started more than 1 year ago. Recent lipid tests were reviewed and are variable. Exacerbating diseases include diabetes. Factors aggravating her hyperlipidemia include thiazides. Associated symptoms include chest pain. Pertinent negatives include no myalgias or shortness of breath. Current antihyperlipidemic treatment includes statins. The current treatment provides moderate improvement of lipids. Compliance problems include adherence to exercise and adherence to diet.    Chest Pain    This is a new problem. The current episode started more than 1 month ago. The onset quality is gradual. The problem occurs intermittently. The problem has been unchanged. The pain is present in the substernal region. The pain is moderate. The quality of the pain is described as pressure and tightness. The pain does not radiate. Associated symptoms include malaise/fatigue. Pertinent negatives include  no abdominal pain, back pain, cough, diaphoresis, dizziness, fever, nausea, numbness, palpitations, shortness of breath, vomiting or weakness.   Her past medical history is significant for diabetes, hyperlipidemia and hypertension.   Her family medical history is significant for heart disease.   Blood Sugar Problem   This is a chronic problem. The current episode started more than 1 year ago. The problem occurs constantly. The problem has been unchanged. Associated symptoms include arthralgias, chest pain and fatigue. Pertinent negatives include no abdominal pain, chills, congestion, coughing, diaphoresis, fever, joint swelling, myalgias, nausea, neck pain, numbness, rash, sore throat, swollen glands, vomiting or weakness.   Insomnia   This is a chronic problem. The current episode started more than 1 year ago. The problem occurs intermittently. The problem has been unchanged. Associated symptoms include arthralgias, chest pain and fatigue. Pertinent negatives include no abdominal pain, chills, congestion, coughing, diaphoresis, fever, joint swelling, myalgias, nausea, neck pain, numbness, rash, sore throat, swollen glands, vomiting or weakness. She has tried nothing for the symptoms.       Medications    Current Outpatient Medications:   •  acetaminophen (TYLENOL) 325 MG tablet, Take  by mouth., Disp: , Rfl:   •  Calcium Carbonate-Vitamin D3 (CALCIUM 600-D) 600-400 MG-UNIT tablet, CALCIUM 600-D 600-400 MG-UNIT TABS, Disp: , Rfl:   •  guaiFENesin (MUCINEX) 600 MG 12 hr tablet, Take  by mouth., Disp: , Rfl:   •  hydroCHLOROthiazide (HYDRODIURIL) 25 MG tablet, TAKE ONE TABLET BY MOUTH ONCE DAILY, Disp: 90 tablet, Rfl: 1  •  levalbuterol (XOPENEX HFA) 45 MCG/ACT inhaler, XOPENEX HFA 45 MCG/ACT AERO, Disp: , Rfl:   •  levocetirizine (XYZAL ALLERGY 24HR) 5 MG tablet, Daily., Disp: , Rfl:   •  losartan (COZAAR) 100 MG tablet, TAKE ONE TABLET BY MOUTH ONCE DAILY FOR BLOOD PRESSURE, Disp: 90 tablet, Rfl: 1  •   mometasone-formoterol (DULERA) 100-5 MCG/ACT inhaler, DULERA 100-5 MCG/ACT AERO, Disp: , Rfl:   •  Multiple Vitamins-Minerals (CENTRUM SILVER) tablet, CENTRUM SILVER TABS, Disp: , Rfl:   •  pantoprazole (PROTONIX) 40 MG EC tablet, Take 40 mg by mouth Daily., Disp: , Rfl:   •  risedronate (ACTONEL) 150 MG tablet, TAKE ONE TABLET BY MOUTH ONCE A MONTH IN THE MORNING 30 MINUTES BEFORE FOOD WITH 8 OUNCES OF WATER AS DIRECTED, Disp: 3 tablet, Rfl: 3  •  simvastatin (ZOCOR) 40 MG tablet, TAKE ONE TABLET BY MOUTH ONCE DAILY AT BEDTIME, Disp: 90 tablet, Rfl: 1  •  SPIRIVA RESPIMAT 1.25 MCG/ACT aerosol solution inhaler, , Disp: , Rfl:   •  vitamin B-12 (CYANOCOBALAMIN) 1000 MCG tablet, Daily., Disp: , Rfl:   •  montelukast (SINGULAIR) 10 MG tablet, Take 1 tablet by mouth Daily., Disp: 90 tablet, Rfl: 3     Problem List  Patient Active Problem List   Diagnosis   • Allergic rhinitis   • Asthma   • Benign hypertension   • Gastroesophageal reflux disease   • Generalized osteoarthritis   • Hyperlipidemia   • Hypertensive heart disease   • Prediabetes   • Insomnia   • Other long term (current) drug therapy   • Osteopenia   • Mixed stress and urge urinary incontinence   • Vitamin B12 deficiency   • Dry eye syndrome of bilateral lacrimal glands   • Bilateral pseudophakia   • Hypermetropia, bilateral   • Pseudophakia   • Regular astigmatism, left eye   • Presence of intraocular lens   • Epiretinal membrane (ERM) of left eye   • Puckering of macula, bilateral   • Tear film insufficiency   • Bruit of left carotid artery   • Cystocele with rectocele       Surgical History  Past Surgical History:   Procedure Laterality Date   • APPENDECTOMY     • CHOLECYSTECTOMY  05/2006    Dr. Hernandez   • ENDOSCOPY  2017    & Dilation. Dr. Oliva   • ERCP  05/2006    Dr. Oliva   • HYSTERECTOMY  1974    ovaries remain, for benign reasons   • OTHER SURGICAL HISTORY  2009    Stress Echo. No ischemia. EF 60-65%.    • TONSILLECTOMY AND ADENOIDECTOMY     •  TOTAL KNEE ARTHROPLASTY  08/31/2017        Social History  Social History     Socioeconomic History   • Marital status:      Spouse name: Not on file   • Number of children: Not on file   • Years of education: Not on file   • Highest education level: Not on file   Tobacco Use   • Smoking status: Never Smoker   • Smokeless tobacco: Never Used   Substance and Sexual Activity   • Alcohol use: No     Frequency: Never   • Drug use: No        Family History  Family History   Problem Relation Age of Onset   • Hypertension Mother    • Hypertension Father    • Heart disease Father    • Prostate cancer Paternal Grandfather         Health Risk Assessment:  The patient has completed a Health Risk Assessment and it has been reviewed.    Current Medical Providers:  Patient Care Team:  Akanksha Mauricio APRN as PCP - General  Akanksha Mauricio APRN as PCP - Family Medicine  Akanksha Mauricio APRN as PCP - [facility]  Fernando Oliva MD as Consulting Physician (Gastroenterology)  Marcin Benito MD as Consulting Physician (Allergy)  John Momin MD as Consulting Physician (Pulmonary Disease)    Age-appropriate Screening Schedule:  Refer to the list below for future screening recommendations based on patient's age. Orders for these recommended tests are listed in the plan section. The patient has been provided with a written plan.    Health Maintenance   Topic Date Due   • TDAP/TD VACCINES (1 - Tdap) 11/23/1956   • ZOSTER VACCINE (2 of 3) 03/20/2015   • MEDICARE ANNUAL WELLNESS  05/17/2019   • LIPID PANEL  02/21/2021   • DXA SCAN  03/13/2021   • Pneumococcal Vaccine Once at 65 Years Old  Completed   • INFLUENZA VACCINE  Completed       Depression Screen:   PHQ-2/PHQ-9 Depression Screening 2/20/2020   Little interest or pleasure in doing things 0   Feeling down, depressed, or hopeless 0   Trouble falling or staying asleep, or sleeping too much 0   Feeling tired or having little energy 0   Poor appetite or  overeating 0   Feeling bad about yourself - or that you are a failure or have let yourself or your family down 0   Trouble concentrating on things, such as reading the newspaper or watching television 0   Moving or speaking so slowly that other people could have noticed. Or the opposite - being so fidgety or restless that you have been moving around a lot more than usual 0   Thoughts that you would be better off dead, or of hurting yourself in some way 0   Total Score 0   If you checked off any problems, how difficult have these problems made it for you to do your work, take care of things at home, or get along with other people? Not difficult at all       Functional and Cognitive Screening:  Functional & Cognitive Status 2/20/2020   Do you have difficulty preparing food and eating? No   Do you have difficulty bathing yourself, getting dressed or grooming yourself? No   Do you have difficulty using the toilet? No   Do you have difficulty moving around from place to place? No   Do you have trouble with steps or getting out of a bed or a chair? No   Current Diet Limited Junk Food   Dental Exam Up to date   Eye Exam Up to date   Exercise (times per week) 0 times per week   Current Exercise Activities Include No Regular Exercise   Do you need help using the phone?  No   Are you deaf or do you have serious difficulty hearing?  No   Do you need help with transportation? No   Do you need help shopping? No   Do you need help preparing meals?  No   Do you need help with housework?  No   Do you need help with laundry? No   Do you need help taking your medications? No   Do you need help managing money? No   Do you ever drive or ride in a car without wearing a seat belt? No   Have you felt unusual stress, anger or loneliness in the last month? No   Who do you live with? Spouse   If you need help, do you have trouble finding someone available to you? No   Have you been bothered in the last four weeks by sexual problems? No   Do  you have difficulty concentrating, remembering or making decisions? No       Does the patient have evidence of cognitive impairment? No    ATTENTION  What is the year: correct (20)  What is the month of the year: correct (20)  What is the day of the week?: correct (20)  What is the date?: correct (20)  MEMORY  Repeat address three times, only score third attempt: Frank Arteaga [address] [place] [address]: 7 (20)  HOW MANY ANIMALS DID THE PATIENT NAME  Verbal Fluency -- Animal Names (0-25): 22+ (20)  CLOCK DRAWING  Clock Drawing: All Correct (20)  MEMORY RECALL  Tell me what you remember about that name and address we were repeating at the beginnin (20)  ACE TOTAL SCORE  Total ACE Score - <25/30 strongly suggests cognitive impairment; <21/30 almost certainly shows dementia: 30 (20)    Advanced Care Planning:  ACP discussion was held with the patient during this visit. Patient has an advance directive that is valid in EMR.     Identification of Risk Factors:  Risk factors include: Breast Cancer/Mammogram Screening  Immunizations Discussed/Encouraged (specific immunizations; Hepatitis A Vaccine/Series and Shingrix )  Inactivity/Sedentary  Urinary Incontinence.    Review of Systems   Constitutional: Positive for fatigue and malaise/fatigue. Negative for appetite change, chills, diaphoresis, fever, unexpected weight gain and unexpected weight loss.        Mild   HENT: Negative for congestion, ear discharge, ear pain, hearing loss, mouth sores, nosebleeds, postnasal drip, rhinorrhea, sinus pressure, sneezing, sore throat, swollen glands and trouble swallowing.    Eyes: Negative for blurred vision, double vision, pain, discharge, redness and itching.   Respiratory: Negative for apnea, cough, choking, shortness of breath, wheezing and stridor.    Cardiovascular: Positive for chest pain. Negative for palpitations  "and leg swelling.        Tightness in chest if stressed. No radiation. Not associated with nausea, SOA, diaphoresis. Lasts minutes and then subsides. Happens a couple of times a month. Started around September 2019.    Gastrointestinal: Negative for abdominal distention, abdominal pain, anal bleeding, blood in stool, constipation, diarrhea, nausea, rectal pain, vomiting, GERD and indigestion.   Endocrine: Negative for cold intolerance, heat intolerance, polydipsia, polyphagia and polyuria.   Genitourinary: Positive for urinary incontinence. Negative for breast discharge, breast lump, breast pain, difficulty urinating, dysuria, flank pain, frequency, genital sores, hematuria, pelvic pain, urgency, vaginal bleeding, vaginal discharge and vaginal pain.        Mild urge/stress. Tried OAB med in past - doesn't want to take.   Musculoskeletal: Positive for arthralgias. Negative for back pain, gait problem, joint swelling, myalgias, neck pain and neck stiffness.        Mild-mod generalized   Skin: Negative for color change, rash and skin lesions.   Neurological: Positive for tremors. Negative for dizziness, syncope, speech difficulty, weakness, light-headedness, numbness, headache, memory problem and confusion.        Mild in hands   Hematological: Negative for adenopathy. Does not bruise/bleed easily.   Psychiatric/Behavioral: Positive for sleep disturbance. Negative for self-injury, suicidal ideas, depressed mood and stress. The patient has insomnia. The patient is not nervous/anxious.        Objective     Vitals:    02/20/20 0748 02/20/20 0852   BP: 146/79 154/70   BP Location: Left arm    Patient Position: Sitting    Cuff Size: Adult    Pulse: 88    Resp: 16    Temp: 97.4 °F (36.3 °C)    TempSrc: Oral    SpO2: 96%    Weight: 64.9 kg (143 lb)    Height: 157.5 cm (62\")          02/20/20  0748   Weight: 64.9 kg (143 lb)     Body mass index is 26.16 kg/m².    Physical Exam   Constitutional: She is oriented to person, " place, and time. She appears well-developed and well-nourished. No distress.   HENT:   Head: Normocephalic and atraumatic.   Right Ear: Tympanic membrane, external ear and ear canal normal. Tympanic membrane is not injected, not erythematous and not retracted.   Left Ear: Tympanic membrane, external ear and ear canal normal. Tympanic membrane is not injected, not erythematous and not retracted.   Nose: Nose normal. No mucosal edema or rhinorrhea. Right sinus exhibits no maxillary sinus tenderness and no frontal sinus tenderness. Left sinus exhibits no maxillary sinus tenderness and no frontal sinus tenderness.   Mouth/Throat: Uvula is midline, oropharynx is clear and moist and mucous membranes are normal. No oral lesions. No oropharyngeal exudate.   Eyes: Pupils are equal, round, and reactive to light. Conjunctivae, EOM and lids are normal. Right eye exhibits no discharge. Left eye exhibits no discharge.   Neck: Normal range of motion. Neck supple. No JVD present. Carotid bruit is present. No tracheal deviation present. No thyroid mass and no thyromegaly present.   + left carotid bruit  - right carotid bruit   Cardiovascular: Normal rate, regular rhythm, normal heart sounds and intact distal pulses. Exam reveals no gallop and no friction rub.   No murmur heard.  Pulmonary/Chest: Effort normal and breath sounds normal. No respiratory distress. She has no wheezes. She has no rales. Right breast exhibits no inverted nipple, no mass, no nipple discharge, no skin change and no tenderness. Left breast exhibits no inverted nipple, no mass, no nipple discharge, no skin change and no tenderness. Breasts are symmetrical.   Abdominal: Soft. Bowel sounds are normal. She exhibits no distension and no mass. There is no hepatosplenomegaly. There is no tenderness. No hernia.   Genitourinary: Rectum normal and vagina normal. Pelvic exam was performed with patient supine. There is no rash, tenderness or lesion on the right labia.  There is no rash, tenderness or lesion on the left labia. Right adnexum displays no mass, no tenderness and no fullness. Left adnexum displays no mass, no tenderness and no fullness. No vaginal discharge found.   Genitourinary Comments: Cervix & uterus are surgically absent.  Grade 2 cystocele & rectocele noted.    Musculoskeletal: Normal range of motion. She exhibits no edema or deformity.   Lymphadenopathy:     She has no cervical adenopathy.     She has no axillary adenopathy. No inguinal adenopathy noted on the right or left side.   Neurological: She is alert and oriented to person, place, and time. She has normal strength and normal reflexes. No cranial nerve deficit or sensory deficit. Gait normal.   Skin: Skin is warm and dry. No lesion and no rash noted. She is not diaphoretic.   Psychiatric: She has a normal mood and affect. Her speech is normal and behavior is normal. Judgment and thought content normal. Cognition and memory are normal.   Vitals reviewed.        Office Visit on 02/20/2020   Component Date Value Ref Range Status   • Total Cholesterol 02/21/2020 146  100 - 199 mg/dL Final   • Triglycerides 02/21/2020 187* 0 - 149 mg/dL Final   • HDL Cholesterol 02/21/2020 51  >39 mg/dL Final   • VLDL Cholesterol 02/21/2020 37  5 - 40 mg/dL Final   • LDL Cholesterol  02/21/2020 58  0 - 99 mg/dL Final   • Vitamin B-12 02/21/2020 >2000* 232 - 1245 pg/mL Final       ECG 12 Lead  Date/Time: 2/20/2020 9:04 AM  Performed by: Akanksha Mauricio APRN  Authorized by: Akanksha Mauricio APRN   Comparison: compared with previous ECG from 8/23/2017  Similar to previous ECG  Rhythm: sinus rhythm  Rate: normal  Q waves: III and aVF    ST Segments: ST segments normal  T Waves: T waves normal  QRS axis: normal  Other: no other findings    Clinical impression: abnormal EKG  Comments: Sinus rhythm with possible old inferior myocardial infarction. Unchanged from prior EKG.           Assessment/Plan   Patient Self-Management and  Personalized Health Advice  The patient has been provided with information about: diet, exercise, weight management and prevention of cardiac or vascular disease and preventive services including:   · Annual Wellness Visit (AWV)  · Cardiovascular Disease Screening Tests (may do this order every 5 years in beneficiaries without signs or symptoms of cardiovascular disease)  · Screening Mammography .    Discussed the patient's BMI with her. The BMI is above average; BMI management plan is completed.    Diagnoses and all orders for this visit:    1. Encounter for general adult medical examination with abnormal findings (Primary)  Comments:  Discussed age appropriate health maintenance. Recommended Shingrix and Hepatitis A vaccines.     2. Chest pain, unspecified type  Comments:  EKG is unchanged from prior. Recommended stress test for further evaluation. Discussed warning signs for seeking further evaluation.   Orders:  -     Stress Test With Myocardial Perfusion - One Day; Future  -     ECG 12 Lead    3. Hypertensive heart disease without heart failure  Assessment & Plan:  Discussed importance of blood pressure control.       4. Benign hypertension  Assessment & Plan:  Elevated. She will return in 1-2 weeks for a nurse visit to recheck BP.     Orders:  -     Cancel: POC Urinalysis Dipstick, Automated    5. Mixed hyperlipidemia  Assessment & Plan:  Tolerating statin therapy. Will call with lab results and further recommendations.     Orders:  -     Lipid Panel    6. Bruit of left carotid artery  Assessment & Plan:  2018 ultrasound showed mild carotid plaquing, with no hemodynamically significant internal carotid stenosis.      7. Prediabetes  Assessment & Plan:  Encouraged healthy diet, regular physical activity.       8. Vitamin B12 deficiency  Assessment & Plan:  Will call with lab results.     Orders:  -     Vitamin B12    9. Gastroesophageal reflux disease without esophagitis  Assessment & Plan:  Controlled as long  as she takes her PPI.   Continue follow-up with Dr. Oliva.       10. Cystocele with rectocele  Assessment & Plan:  She declines referral to discuss surgical treatment.       11. Mild intermittent asthma without complication  Assessment & Plan:  Improved. Continue follow-up with Dr. Benito.         12. Allergic rhinitis, unspecified seasonality, unspecified trigger  Assessment & Plan:  On immunotherapy. Continue follow-up with Dr. Benito.       13. Generalized osteoarthritis  Assessment & Plan:  Controlled with Tylenol PRN.       14. Osteopenia of multiple sites  Assessment & Plan:  Encouraged increase in weight-bearing activities. Encouraged calcium intake of 1500 mg daily.      15. Mixed stress and urge urinary incontinence  Assessment & Plan:  Discussed kegel exercises. She declines OAB medication trial.       16. Primary insomnia  Assessment & Plan:  Discussed sleep hygiene. She may try Melatonin.       17. Breast cancer screening by mammogram  -     Mammo Screening Digital Tomosynthesis Bilateral With CAD; Future      Orders:  Orders Placed This Encounter   Procedures   • Mammo Screening Digital Tomosynthesis Bilateral With CAD   • Lipid Panel   • Vitamin B12   • Stress Test With Myocardial Perfusion - One Day   • ECG 12 Lead       Follow Up:  No follow-ups on file.     An After Visit Summary and PPPS with all of these plans were given to the patient.

## 2020-02-22 LAB
CHOLEST SERPL-MCNC: 146 MG/DL (ref 100–199)
HDLC SERPL-MCNC: 51 MG/DL
LDLC SERPL CALC-MCNC: 58 MG/DL (ref 0–99)
TRIGL SERPL-MCNC: 187 MG/DL (ref 0–149)
VIT B12 SERPL-MCNC: >2000 PG/ML (ref 232–1245)
VLDLC SERPL CALC-MCNC: 37 MG/DL (ref 5–40)

## 2020-02-23 PROBLEM — N81.10 CYSTOCELE WITH RECTOCELE: Status: ACTIVE | Noted: 2020-02-23

## 2020-02-23 PROBLEM — N81.6 CYSTOCELE WITH RECTOCELE: Status: ACTIVE | Noted: 2020-02-23

## 2020-02-24 NOTE — ASSESSMENT & PLAN NOTE
2018 ultrasound showed mild carotid plaquing, with no hemodynamically significant internal carotid stenosis.

## 2020-03-04 ENCOUNTER — OFFICE (OUTPATIENT)
Dept: RURAL CLINIC 3 | Facility: CLINIC | Age: 75
End: 2020-03-04

## 2020-03-04 VITALS
DIASTOLIC BLOOD PRESSURE: 72 MMHG | HEART RATE: 81 BPM | SYSTOLIC BLOOD PRESSURE: 116 MMHG | WEIGHT: 146 LBS | HEIGHT: 62 IN

## 2020-03-04 DIAGNOSIS — K21.9 GASTRO-ESOPHAGEAL REFLUX DISEASE WITHOUT ESOPHAGITIS: ICD-10-CM

## 2020-03-04 DIAGNOSIS — R13.10 DYSPHAGIA, UNSPECIFIED: ICD-10-CM

## 2020-03-04 PROCEDURE — 99213 OFFICE O/P EST LOW 20 MIN: CPT | Performed by: NURSE PRACTITIONER

## 2020-03-04 RX ORDER — PANTOPRAZOLE SODIUM 40 MG/1
40 TABLET, DELAYED RELEASE ORAL
Qty: 90 | Refills: 3 | Status: COMPLETED
Start: 2019-06-21 | End: 2021-03-17

## 2020-03-05 ENCOUNTER — HOSPITAL ENCOUNTER (OUTPATIENT)
Dept: MAMMOGRAPHY | Facility: HOSPITAL | Age: 75
Discharge: HOME | End: 2020-03-05
Admitting: NURSE PRACTITIONER

## 2020-03-05 DIAGNOSIS — Z12.31 BREAST CANCER SCREENING BY MAMMOGRAM: ICD-10-CM

## 2020-03-05 PROCEDURE — 77063 BREAST TOMOSYNTHESIS BI: CPT

## 2020-03-05 PROCEDURE — 77067 SCR MAMMO BI INCL CAD: CPT

## 2020-03-27 RX ORDER — HYDROCHLOROTHIAZIDE 25 MG/1
TABLET ORAL
Qty: 90 TABLET | Refills: 0 | Status: SHIPPED | OUTPATIENT
Start: 2020-03-27 | End: 2020-06-24

## 2020-04-03 ENCOUNTER — TELEPHONE (OUTPATIENT)
Dept: PULMONOLOGY | Facility: HOSPITAL | Age: 75
End: 2020-04-03

## 2020-06-05 RX ORDER — LOSARTAN POTASSIUM 100 MG/1
TABLET ORAL
Qty: 90 TABLET | Refills: 1 | Status: SHIPPED | OUTPATIENT
Start: 2020-06-05 | End: 2020-12-04

## 2020-06-24 RX ORDER — HYDROCHLOROTHIAZIDE 25 MG/1
TABLET ORAL
Qty: 90 TABLET | Refills: 0 | Status: SHIPPED | OUTPATIENT
Start: 2020-06-24 | End: 2020-10-01

## 2020-09-15 RX ORDER — SIMVASTATIN 40 MG
TABLET ORAL
Qty: 90 TABLET | Refills: 0 | Status: SHIPPED | OUTPATIENT
Start: 2020-09-15 | End: 2021-02-10

## 2020-10-01 ENCOUNTER — TELEPHONE (OUTPATIENT)
Dept: FAMILY MEDICINE CLINIC | Facility: CLINIC | Age: 75
End: 2020-10-01

## 2020-10-01 RX ORDER — HYDROCHLOROTHIAZIDE 25 MG/1
TABLET ORAL
Qty: 90 TABLET | Refills: 0 | Status: SHIPPED | OUTPATIENT
Start: 2020-10-01 | End: 2020-12-28

## 2020-10-20 ENCOUNTER — FLU SHOT (OUTPATIENT)
Dept: FAMILY MEDICINE CLINIC | Facility: CLINIC | Age: 75
End: 2020-10-20

## 2020-10-20 DIAGNOSIS — Z23 NEED FOR INFLUENZA VACCINATION: ICD-10-CM

## 2020-10-20 PROCEDURE — G0008 ADMIN INFLUENZA VIRUS VAC: HCPCS | Performed by: NURSE PRACTITIONER

## 2020-10-20 PROCEDURE — 90694 VACC AIIV4 NO PRSRV 0.5ML IM: CPT | Performed by: NURSE PRACTITIONER

## 2020-10-22 ENCOUNTER — OFFICE VISIT (OUTPATIENT)
Dept: PULMONOLOGY | Facility: HOSPITAL | Age: 75
End: 2020-10-22

## 2020-10-22 VITALS
SYSTOLIC BLOOD PRESSURE: 130 MMHG | BODY MASS INDEX: 26.5 KG/M2 | HEIGHT: 62 IN | TEMPERATURE: 98.6 F | RESPIRATION RATE: 16 BRPM | WEIGHT: 144 LBS | OXYGEN SATURATION: 96 % | HEART RATE: 88 BPM | DIASTOLIC BLOOD PRESSURE: 79 MMHG

## 2020-10-22 DIAGNOSIS — J45.30 MILD PERSISTENT ASTHMA WITHOUT COMPLICATION: Primary | ICD-10-CM

## 2020-10-22 DIAGNOSIS — J30.9 ALLERGIC RHINITIS, UNSPECIFIED SEASONALITY, UNSPECIFIED TRIGGER: ICD-10-CM

## 2020-10-22 DIAGNOSIS — I10 BENIGN HYPERTENSION: ICD-10-CM

## 2020-10-22 PROCEDURE — G0463 HOSPITAL OUTPT CLINIC VISIT: HCPCS

## 2020-10-22 RX ORDER — CETIRIZINE HYDROCHLORIDE 10 MG/1
10 TABLET ORAL DAILY
COMMUNITY

## 2020-10-22 RX ORDER — TIOTROPIUM BROMIDE INHALATION SPRAY 1.56 UG/1
2 SPRAY, METERED RESPIRATORY (INHALATION)
Qty: 1 INHALER | Refills: 12 | Status: SHIPPED | OUTPATIENT
Start: 2020-10-22 | End: 2022-02-24

## 2020-10-22 NOTE — PROGRESS NOTES
10/22/2020     Cara Najerakiran  1945      Chief Complaint   Patient presents with   • Asthma     1 Year F/U         Subjective   75 y/o female with hx of Asthma, here today for follow up.  Pt without recent hospitalization or exacerbation. Well controlled on Dulera and Spiriva daily.  She does have environmental allergies and takes allergy shots and Singulair. Reports rare JAMILA use.       Review of System  General: Denies fevers or chills.  Denies any weakness or fatigue.  Denies any weight loss or weight gain.  HEENT: Denies sore throat, rhinorrhea, ear pain.  Denies any change in vision.   LUNGS: Denies any shortness of breath or wheezing.  Denies any cough.  Denies any hemoptysis.  CARDIAC: Denies any chest pain, palpitations. Denies edema  ABD: Denies any abdominal pain.  Denies any N/V/D  PSYCH: Negative for suicidal ideas. Denies anxiety or depression  All other systems reviewed and negative unless stated in HPI       Objective    Vitals:    10/22/20 1310   BP: 130/79   Pulse: 88   Resp: 16   Temp: 98.6 °F (37 °C)   SpO2: 96%       General: NAD, alert and oriented x 4  Cardiac: S1, S2, RRR, no murmur, no edema  Pulmonary: CTA bilaterally, no wheezing   Abdomen: soft, non-tender, non-distended  : Voids independently, no CVA tenderness   Musculoskeletal: Moves all extremities, equal strength bilaterally  Neuro: alert and oriented x 3, CN 2 - 12 grossly intact  Skin: warm, dry, and intact          Current Outpatient Medications:   •  acetaminophen (TYLENOL) 325 MG tablet, Take  by mouth., Disp: , Rfl:   •  Calcium Carbonate-Vitamin D3 (CALCIUM 600-D) 600-400 MG-UNIT tablet, CALCIUM 600-D 600-400 MG-UNIT TABS, Disp: , Rfl:   •  cetirizine (zyrTEC) 10 MG tablet, Take 10 mg by mouth Daily., Disp: , Rfl:   •  guaiFENesin (MUCINEX) 600 MG 12 hr tablet, Take  by mouth., Disp: , Rfl:   •  hydroCHLOROthiazide (HYDRODIURIL) 25 MG tablet, TAKE ONE TABLET BY MOUTH ONCE DAILY, Disp: 90 tablet, Rfl: 0  •   levalbuterol (XOPENEX HFA) 45 MCG/ACT inhaler, XOPENEX HFA 45 MCG/ACT AERO, Disp: , Rfl:   •  losartan (COZAAR) 100 MG tablet, TAKE ONE TABLET BY MOUTH ONCE DAILY FOR BLOOD PRESSURE, Disp: 90 tablet, Rfl: 1  •  mometasone-formoterol (DULERA) 100-5 MCG/ACT inhaler, DULERA 100-5 MCG/ACT AERO, Disp: , Rfl:   •  montelukast (SINGULAIR) 10 MG tablet, Take 1 tablet by mouth Daily., Disp: 90 tablet, Rfl: 3  •  Multiple Vitamins-Minerals (CENTRUM SILVER) tablet, CENTRUM SILVER TABS, Disp: , Rfl:   •  pantoprazole (PROTONIX) 40 MG EC tablet, Take 40 mg by mouth Daily., Disp: , Rfl:   •  risedronate (ACTONEL) 150 MG tablet, TAKE ONE TABLET BY MOUTH ONCE A MONTH IN THE MORNING 30 MINUTES BEFORE FOOD WITH 8 OUNCES OF WATER AS DIRECTED, Disp: 3 tablet, Rfl: 3  •  simvastatin (ZOCOR) 40 MG tablet, TAKE ONE TABLET BY MOUTH AT BEDTIME, Disp: 90 tablet, Rfl: 0  •  Tiotropium Bromide Monohydrate (Spiriva Respimat) 1.25 MCG/ACT aerosol solution inhaler, Inhale 2 puffs Daily., Disp: 1 inhaler, Rfl: 2  •  vitamin B-12 (CYANOCOBALAMIN) 1000 MCG tablet, Daily., Disp: , Rfl:     Social History     Socioeconomic History   • Marital status:      Spouse name: Not on file   • Number of children: Not on file   • Years of education: Not on file   • Highest education level: Not on file   Tobacco Use   • Smoking status: Never Smoker   • Smokeless tobacco: Never Used   Substance and Sexual Activity   • Alcohol use: No     Frequency: Never   • Drug use: No   • Sexual activity: Defer       Past Medical History:   Diagnosis Date   • Allergic rhinitis     on immunotherapy- Dr. Benito    • Asthma     Dr. Benito   • Bruit of left carotid artery 2/20/2020   • Carotid stenosis     <50%   • Cold sore    • Gallstone pancreatitis 2006   • GERD (gastroesophageal reflux disease)     Dr. Oliva   • Hiatal hernia     small, sliding    • Hyperlipidemia    • Hypertension    • Left ventricular hypertrophy    • Osteoarthritis     Dr. Avila   • Osteopenia with  high risk of fracture    • Pre-diabetes    • Vocal cord nodule     Dr. Nieves               Diagnoses and all orders for this visit:    1. Mild persistent asthma without complication (Primary)  - Well controlled on Dulera and Spiriva. Contin ue same.   2. Allergic rhinitis, unspecified seasonality, unspecified trigger  - Well controlled continue Singulair, Zyrtec.   3. Benign hypertension  - Well controlled.         John Momin MD

## 2020-12-04 RX ORDER — LOSARTAN POTASSIUM 100 MG/1
TABLET ORAL
Qty: 90 TABLET | Refills: 0 | Status: SHIPPED | OUTPATIENT
Start: 2020-12-04 | End: 2021-03-03

## 2020-12-28 RX ORDER — HYDROCHLOROTHIAZIDE 25 MG/1
TABLET ORAL
Qty: 90 TABLET | Refills: 0 | Status: SHIPPED | OUTPATIENT
Start: 2020-12-28 | End: 2021-03-29

## 2021-01-11 ENCOUNTER — TELEPHONE (OUTPATIENT)
Dept: FAMILY MEDICINE CLINIC | Facility: CLINIC | Age: 76
End: 2021-01-11

## 2021-01-11 DIAGNOSIS — R53.83 FATIGUE, UNSPECIFIED TYPE: ICD-10-CM

## 2021-01-11 DIAGNOSIS — I10 BENIGN HYPERTENSION: Primary | ICD-10-CM

## 2021-01-11 DIAGNOSIS — E53.8 VITAMIN B12 DEFICIENCY: ICD-10-CM

## 2021-01-11 DIAGNOSIS — M85.89 OSTEOPENIA OF MULTIPLE SITES: ICD-10-CM

## 2021-01-11 DIAGNOSIS — R73.03 PREDIABETES: ICD-10-CM

## 2021-01-11 DIAGNOSIS — E78.2 MIXED HYPERLIPIDEMIA: ICD-10-CM

## 2021-01-11 NOTE — TELEPHONE ENCOUNTER
Patient has AWV set for 2/23 , she would like to do labs before      Please send over order and call patient

## 2021-02-01 LAB
1,25(OH)2D SERPL-MCNC: 31.7 PG/ML (ref 19.9–79.3)
ALBUMIN SERPL-MCNC: 4.3 G/DL (ref 3.7–4.7)
ALBUMIN/GLOB SERPL: 1.9 {RATIO} (ref 1.2–2.2)
ALP SERPL-CCNC: 68 IU/L (ref 39–117)
ALT SERPL-CCNC: 14 IU/L (ref 0–32)
AST SERPL-CCNC: 17 IU/L (ref 0–40)
BASOPHILS # BLD AUTO: 0.1 X10E3/UL (ref 0–0.2)
BASOPHILS NFR BLD AUTO: 1 %
BILIRUB SERPL-MCNC: 0.3 MG/DL (ref 0–1.2)
BUN SERPL-MCNC: 13 MG/DL (ref 8–27)
BUN/CREAT SERPL: 14 (ref 12–28)
CALCIUM SERPL-MCNC: 10.3 MG/DL (ref 8.7–10.3)
CHLORIDE SERPL-SCNC: 99 MMOL/L (ref 96–106)
CHOLEST SERPL-MCNC: 164 MG/DL (ref 100–199)
CO2 SERPL-SCNC: 24 MMOL/L (ref 20–29)
CREAT SERPL-MCNC: 0.9 MG/DL (ref 0.57–1)
EOSINOPHIL # BLD AUTO: 0.2 X10E3/UL (ref 0–0.4)
EOSINOPHIL NFR BLD AUTO: 3 %
ERYTHROCYTE [DISTWIDTH] IN BLOOD BY AUTOMATED COUNT: 14 % (ref 11.7–15.4)
GLOBULIN SER CALC-MCNC: 2.3 G/DL (ref 1.5–4.5)
GLUCOSE SERPL-MCNC: 106 MG/DL (ref 65–99)
HBA1C MFR BLD: 5.8 % (ref 4.8–5.6)
HCT VFR BLD AUTO: 38.9 % (ref 34–46.6)
HDLC SERPL-MCNC: 54 MG/DL
HGB BLD-MCNC: 12.7 G/DL (ref 11.1–15.9)
IMM GRANULOCYTES # BLD AUTO: 0 X10E3/UL (ref 0–0.1)
IMM GRANULOCYTES NFR BLD AUTO: 0 %
LDLC SERPL CALC-MCNC: 78 MG/DL (ref 0–99)
LYMPHOCYTES # BLD AUTO: 1.2 X10E3/UL (ref 0.7–3.1)
LYMPHOCYTES NFR BLD AUTO: 21 %
MCH RBC QN AUTO: 28 PG (ref 26.6–33)
MCHC RBC AUTO-ENTMCNC: 32.6 G/DL (ref 31.5–35.7)
MCV RBC AUTO: 86 FL (ref 79–97)
MONOCYTES # BLD AUTO: 0.5 X10E3/UL (ref 0.1–0.9)
MONOCYTES NFR BLD AUTO: 9 %
NEUTROPHILS # BLD AUTO: 3.8 X10E3/UL (ref 1.4–7)
NEUTROPHILS NFR BLD AUTO: 66 %
PLATELET # BLD AUTO: 345 X10E3/UL (ref 150–450)
POTASSIUM SERPL-SCNC: 4.1 MMOL/L (ref 3.5–5.2)
PROT SERPL-MCNC: 6.6 G/DL (ref 6–8.5)
RBC # BLD AUTO: 4.53 X10E6/UL (ref 3.77–5.28)
SODIUM SERPL-SCNC: 137 MMOL/L (ref 134–144)
TRIGL SERPL-MCNC: 195 MG/DL (ref 0–149)
TSH SERPL DL<=0.005 MIU/L-ACNC: 3.11 UIU/ML (ref 0.45–4.5)
VIT B12 SERPL-MCNC: 1805 PG/ML (ref 232–1245)
VLDLC SERPL CALC-MCNC: 32 MG/DL (ref 5–40)
WBC # BLD AUTO: 5.7 X10E3/UL (ref 3.4–10.8)

## 2021-02-10 RX ORDER — SIMVASTATIN 40 MG
TABLET ORAL
Qty: 90 TABLET | Refills: 0 | Status: SHIPPED | OUTPATIENT
Start: 2021-02-10 | End: 2021-05-12

## 2021-02-10 RX ORDER — MONTELUKAST SODIUM 10 MG/1
TABLET ORAL
Qty: 90 TABLET | Refills: 3 | Status: SHIPPED | OUTPATIENT
Start: 2021-02-10 | End: 2022-02-08

## 2021-02-10 RX ORDER — RISEDRONATE SODIUM 150 MG/1
TABLET, FILM COATED ORAL
Qty: 3 TABLET | Refills: 3 | Status: SHIPPED | OUTPATIENT
Start: 2021-02-10 | End: 2022-02-08

## 2021-02-23 ENCOUNTER — OFFICE VISIT (OUTPATIENT)
Dept: FAMILY MEDICINE CLINIC | Facility: CLINIC | Age: 76
End: 2021-02-23

## 2021-02-23 ENCOUNTER — HOSPITAL ENCOUNTER (OUTPATIENT)
Dept: GENERAL RADIOLOGY | Facility: HOSPITAL | Age: 76
Discharge: HOME | End: 2021-02-23
Admitting: NURSE PRACTITIONER

## 2021-02-23 VITALS
WEIGHT: 145 LBS | BODY MASS INDEX: 26.68 KG/M2 | SYSTOLIC BLOOD PRESSURE: 138 MMHG | DIASTOLIC BLOOD PRESSURE: 80 MMHG | HEIGHT: 62 IN | TEMPERATURE: 97.4 F | HEART RATE: 79 BPM | OXYGEN SATURATION: 99 %

## 2021-02-23 DIAGNOSIS — M25.531 RIGHT WRIST PAIN: ICD-10-CM

## 2021-02-23 DIAGNOSIS — M85.89 OSTEOPENIA OF MULTIPLE SITES: ICD-10-CM

## 2021-02-23 DIAGNOSIS — F51.01 PRIMARY INSOMNIA: ICD-10-CM

## 2021-02-23 DIAGNOSIS — K21.9 GASTROESOPHAGEAL REFLUX DISEASE WITHOUT ESOPHAGITIS: ICD-10-CM

## 2021-02-23 DIAGNOSIS — M15.9 GENERALIZED OSTEOARTHRITIS: ICD-10-CM

## 2021-02-23 DIAGNOSIS — R73.03 PREDIABETES: ICD-10-CM

## 2021-02-23 DIAGNOSIS — N81.10 CYSTOCELE WITH RECTOCELE: ICD-10-CM

## 2021-02-23 DIAGNOSIS — N81.6 CYSTOCELE WITH RECTOCELE: ICD-10-CM

## 2021-02-23 DIAGNOSIS — Z12.72 SCREENING FOR VAGINAL CANCER: ICD-10-CM

## 2021-02-23 DIAGNOSIS — I10 BENIGN HYPERTENSION: ICD-10-CM

## 2021-02-23 DIAGNOSIS — Z00.01 ENCOUNTER FOR GENERAL ADULT MEDICAL EXAMINATION WITH ABNORMAL FINDINGS: Primary | ICD-10-CM

## 2021-02-23 DIAGNOSIS — Z12.31 BREAST CANCER SCREENING BY MAMMOGRAM: ICD-10-CM

## 2021-02-23 DIAGNOSIS — I11.9 HYPERTENSIVE HEART DISEASE WITHOUT HEART FAILURE: ICD-10-CM

## 2021-02-23 DIAGNOSIS — E78.2 MIXED HYPERLIPIDEMIA: ICD-10-CM

## 2021-02-23 DIAGNOSIS — Z12.11 SCREENING FOR COLON CANCER: ICD-10-CM

## 2021-02-23 DIAGNOSIS — N39.46 MIXED STRESS AND URGE URINARY INCONTINENCE: ICD-10-CM

## 2021-02-23 DIAGNOSIS — E53.8 VITAMIN B12 DEFICIENCY: ICD-10-CM

## 2021-02-23 DIAGNOSIS — R09.89 BRUIT OF LEFT CAROTID ARTERY: ICD-10-CM

## 2021-02-23 DIAGNOSIS — J30.9 ALLERGIC RHINITIS, UNSPECIFIED SEASONALITY, UNSPECIFIED TRIGGER: ICD-10-CM

## 2021-02-23 DIAGNOSIS — J45.30 MILD PERSISTENT ASTHMA WITHOUT COMPLICATION: ICD-10-CM

## 2021-02-23 PROCEDURE — 99214 OFFICE O/P EST MOD 30 MIN: CPT | Performed by: NURSE PRACTITIONER

## 2021-02-23 PROCEDURE — G0439 PPPS, SUBSEQ VISIT: HCPCS | Performed by: NURSE PRACTITIONER

## 2021-02-23 PROCEDURE — G0101 CA SCREEN;PELVIC/BREAST EXAM: HCPCS | Performed by: NURSE PRACTITIONER

## 2021-02-23 PROCEDURE — 73110 X-RAY EXAM OF WRIST: CPT

## 2021-02-23 NOTE — ASSESSMENT & PLAN NOTE
She is having worsening pain in the right wrist.  She has been using Tylenol as needed.  She has seen [facility] and [facility] in the past for bilateral hand and wrist pain, she declines referral back to them at this time.

## 2021-02-23 NOTE — ASSESSMENT & PLAN NOTE
A1c is still within prediabetes range.  Discussed dietary control.  Encouraged her to increase physical activity.  Repeat in 6 months.

## 2021-02-23 NOTE — ASSESSMENT & PLAN NOTE
Continued mild bruit on the left.    2018 ultrasound that was done for the same reason showed mild plaquing but no significant stenosis.  She continues to be asymptomatic.  Continue control of risk factors.

## 2021-02-23 NOTE — PROGRESS NOTES
Medicare Annual Wellness Visit  Chief Complaint   Patient presents with   • Medicare Wellness-subsequent   • Hypertension   • Heartburn   • Osteopenia   • Hyperlipidemia       Subjective     Cara Castañeda is a 75 y.o. female who presents for an Annual Wellness Visit. In addition, we addressed the following health issues:    HPI    Medications    Current Outpatient Medications:   •  acetaminophen (TYLENOL) 325 MG tablet, Take  by mouth., Disp: , Rfl:   •  Calcium Carbonate-Vitamin D3 (CALCIUM 600-D) 600-400 MG-UNIT tablet, CALCIUM 600-D 600-400 MG-UNIT TABS, Disp: , Rfl:   •  cetirizine (zyrTEC) 10 MG tablet, Take 10 mg by mouth Daily., Disp: , Rfl:   •  guaiFENesin (MUCINEX) 600 MG 12 hr tablet, Take  by mouth., Disp: , Rfl:   •  hydroCHLOROthiazide (HYDRODIURIL) 25 MG tablet, TAKE ONE TABLET BY MOUTH ONCE DAILY, Disp: 90 tablet, Rfl: 0  •  levalbuterol (XOPENEX HFA) 45 MCG/ACT inhaler, XOPENEX HFA 45 MCG/ACT AERO, Disp: , Rfl:   •  losartan (COZAAR) 100 MG tablet, TAKE ONE TABLET BY MOUTH ONCE DAILY FOR BLOOD PRESSURE, Disp: 90 tablet, Rfl: 0  •  mometasone-formoterol (DULERA) 100-5 MCG/ACT inhaler, DULERA 100-5 MCG/ACT AERO, Disp: , Rfl:   •  montelukast (SINGULAIR) 10 MG tablet, TAKE ONE TABLET BY MOUTH ONCE DAILY, Disp: 90 tablet, Rfl: 3  •  Multiple Vitamins-Minerals (CENTRUM SILVER) tablet, CENTRUM SILVER TABS, Disp: , Rfl:   •  pantoprazole (PROTONIX) 40 MG EC tablet, Take 40 mg by mouth Daily., Disp: , Rfl:   •  risedronate (ACTONEL) 150 MG tablet, TAKE ONE TABLET BY MOUTH ONCE A MONTH IN THE MORNING 30 MINUTES BEFORE FIRST FOOD WITH 8 OUNCES OF WATER, Disp: 3 tablet, Rfl: 3  •  simvastatin (ZOCOR) 40 MG tablet, TAKE ONE TABLET BY MOUTH AT BEDTIME, Disp: 90 tablet, Rfl: 0  •  Tiotropium Bromide Monohydrate (Spiriva Respimat) 1.25 MCG/ACT aerosol solution inhaler, Inhale 2 puffs Daily., Disp: 1 inhaler, Rfl: 12  •  vitamin B-12 (CYANOCOBALAMIN) 1000 MCG tablet, Daily., Disp: , Rfl:      Problem  List  Patient Active Problem List   Diagnosis   • Allergic rhinitis   • Asthma   • Benign hypertension   • Gastroesophageal reflux disease   • Generalized osteoarthritis   • Hyperlipidemia   • Hypertensive heart disease   • Prediabetes   • Insomnia   • Other long term (current) drug therapy   • Osteopenia   • Mixed stress and urge urinary incontinence   • Vitamin B12 deficiency   • Dry eye syndrome of bilateral lacrimal glands   • Bilateral pseudophakia   • Hypermetropia, bilateral   • Pseudophakia   • Regular astigmatism, left eye   • Presence of intraocular lens   • Epiretinal membrane (ERM) of left eye   • Puckering of macula, bilateral   • Tear film insufficiency   • Bruit of left carotid artery   • Cystocele with rectocele       Surgical History  Past Surgical History:   Procedure Laterality Date   • APPENDECTOMY     • CHOLECYSTECTOMY  05/2006    Dr. Hernandez   • ENDOSCOPY  2017    & Dilation. Dr. Oliva   • ERCP  05/2006    Dr. Oliva   • HYSTERECTOMY  1974    ovaries remain, for benign reasons   • OTHER SURGICAL HISTORY  2009    Stress Echo. No ischemia. EF 60-65%.    • TONSILLECTOMY AND ADENOIDECTOMY     • TOTAL KNEE ARTHROPLASTY  08/31/2017        Social History  Social History     Socioeconomic History   • Marital status:      Spouse name: Not on file   • Number of children: Not on file   • Years of education: Not on file   • Highest education level: Not on file   Tobacco Use   • Smoking status: Never Smoker   • Smokeless tobacco: Never Used   Substance and Sexual Activity   • Alcohol use: No     Frequency: Never   • Drug use: No   • Sexual activity: Defer        Family History  Family History   Problem Relation Age of Onset   • Hypertension Mother    • Hypertension Father    • Heart disease Father    • Prostate cancer Paternal Grandfather         Health Risk Assessment:  The patient has completed a Health Risk Assessment and it has been reviewed.    Current Medical Providers:  Patient Care  Team:  Akanksha Mauricio APRN as PCP - General  Akanksha Mauricio APRN as PCP - Family Medicine  Fernando Oliva MD as Consulting Physician (Gastroenterology)  Marcin Benito MD as Consulting Physician (Allergy)  Natty Hodge MD as Consulting Physician (Pulmonary Disease)    Age-appropriate Screening Schedule:  Refer to the list below for future screening recommendations based on patient's age. Orders for these recommended tests are listed in the plan section. The patient has been provided with a written plan.    Health Maintenance   Topic Date Due   • TDAP/TD VACCINES (1 - Tdap) 11/23/1964   • ZOSTER VACCINE (2 of 3) 03/20/2015   • HEPATITIS C SCREENING  05/17/2019   • DXA SCAN  03/13/2021   • LIPID PANEL  01/29/2022   • ANNUAL WELLNESS VISIT  02/23/2022   • COLOGUARD  04/01/2022   • INFLUENZA VACCINE  Completed   • Pneumococcal Vaccine 65+  Completed   • MENINGOCOCCAL VACCINE  Aged Out       Depression Screen:   PHQ-2/PHQ-9 Depression Screening 2/23/2021   Little interest or pleasure in doing things 0   Feeling down, depressed, or hopeless 0   Trouble falling or staying asleep, or sleeping too much 1   Feeling tired or having little energy 0   Poor appetite or overeating 1   Feeling bad about yourself - or that you are a failure or have let yourself or your family down 0   Trouble concentrating on things, such as reading the newspaper or watching television 0   Moving or speaking so slowly that other people could have noticed. Or the opposite - being so fidgety or restless that you have been moving around a lot more than usual 0   Thoughts that you would be better off dead, or of hurting yourself in some way 0   Total Score 2   If you checked off any problems, how difficult have these problems made it for you to do your work, take care of things at home, or get along with other people? Not difficult at all       Functional and Cognitive Screening:  Functional & Cognitive Status 2/23/2021   Do you have  difficulty preparing food and eating? No   Do you have difficulty bathing yourself, getting dressed or grooming yourself? No   Do you have difficulty using the toilet? No   Do you have difficulty moving around from place to place? No   Do you have trouble with steps or getting out of a bed or a chair? No   Current Diet Well Balanced Diet   Dental Exam Unknown   Eye Exam Up to date   Exercise (times per week) 1 times per week   Current Exercises Include Walking   Current Exercise Activities Include -   Do you need help using the phone?  No   Are you deaf or do you have serious difficulty hearing?  No   Do you need help with transportation? No   Do you need help shopping? No   Do you need help preparing meals?  No   Do you need help with housework?  No   Do you need help with laundry? No   Do you need help taking your medications? No   Do you need help managing money? No   Do you ever drive or ride in a car without wearing a seat belt? No   Have you felt unusual stress, anger or loneliness in the last month? No   Who do you live with? Spouse   If you need help, do you have trouble finding someone available to you? No   Have you been bothered in the last four weeks by sexual problems? No   Do you have difficulty concentrating, remembering or making decisions? No       Does the patient have evidence of cognitive impairment? No    ATTENTION  What is the year: correct (02/23/21 0900)  What is the month of the year: correct (02/23/21 0900)  What is the day of the week?: correct (02/23/21 0900)  What is the date?: correct (02/23/21 0900)  MEMORY  Repeat address three times, only score third attempt: Frank Arteaga [address] [place] [address]: 7 (02/23/21 0900)  HOW MANY ANIMALS DID THE PATIENT NAME  Verbal Fluency -- Animal Names (0-25): 22+ (02/23/21 0900)  CLOCK DRAWING  Clock Drawing: All Correct (02/23/21 0900)  MEMORY RECALL  Tell me what you remember about that name and address we were repeating at the  beginnin (21)  ACE TOTAL SCORE  Total ACE Score - <25/30 strongly suggests cognitive impairment; <21/30 almost certainly shows dementia: 30 (21)    Advanced Care Planning:  ACP discussion was held with the patient during this visit. Patient has an advance directive in EMR which is still valid.     Identification of Risk Factors:  Risk factors include: Breast Cancer/Mammogram Screening  Cardiovascular risk  Immunizations Discussed/Encouraged (specific immunizations; Shingrix )  Obesity/Overweight   Osteoprorosis Risk  Urinary Incontinence.    Review of Systems   Constitutional: Positive for fatigue. Negative for appetite change, chills, diaphoresis, fever, unexpected weight gain and unexpected weight loss.        Mild   HENT: Negative for congestion, ear discharge, ear pain, hearing loss, mouth sores, nosebleeds, postnasal drip, rhinorrhea, sinus pressure, sneezing, sore throat, swollen glands and trouble swallowing.    Eyes: Negative for blurred vision, double vision, pain, discharge, redness and itching.   Respiratory: Negative for apnea, cough, choking, shortness of breath, wheezing and stridor.    Cardiovascular: Negative for chest pain, palpitations and leg swelling.   Gastrointestinal: Negative for abdominal distention, abdominal pain, anal bleeding, blood in stool, constipation, diarrhea, nausea, rectal pain, vomiting and indigestion.   Endocrine: Negative for cold intolerance, heat intolerance, polydipsia, polyphagia and polyuria.   Genitourinary: Positive for urinary incontinence. Negative for breast discharge, breast lump, breast pain, difficulty urinating, dysuria, flank pain, frequency, genital sores, hematuria, pelvic pain, urgency, vaginal bleeding, vaginal discharge and vaginal pain.        Mild urge/stress. Tried OAB med in past - doesn't want to take.   Musculoskeletal: Positive for arthralgias. Negative for back pain, gait problem, joint swelling, myalgias, neck pain and  "neck stiffness.        Mild-mod generalized. Right wrist pain has been worsening.    Skin: Negative for color change, rash and skin lesions.   Neurological: Positive for tremors. Negative for dizziness, syncope, speech difficulty, weakness, light-headedness, numbness, headache, memory problem and confusion.        Mild in hands - no change   Hematological: Negative for adenopathy. Does not bruise/bleed easily.   Psychiatric/Behavioral: Negative for self-injury, sleep disturbance, suicidal ideas, depressed mood and stress. The patient is not nervous/anxious.        Objective     Vitals:    02/23/21 0902   BP: 138/80   Pulse: 79   Temp: 97.4 °F (36.3 °C)   TempSrc: Infrared   SpO2: 99%   Weight: 65.8 kg (145 lb)   Height: 157.5 cm (62\")         02/23/21 0902   Weight: 65.8 kg (145 lb)     Body mass index is 26.52 kg/m².    Physical Exam  Vitals signs reviewed.   Constitutional:       General: She is not in acute distress.     Appearance: She is well-developed. She is not diaphoretic.   HENT:      Head: Normocephalic and atraumatic.      Right Ear: Tympanic membrane, ear canal and external ear normal. Tympanic membrane is not injected, erythematous or retracted.      Left Ear: Tympanic membrane, ear canal and external ear normal. Tympanic membrane is not injected, erythematous or retracted.      Nose: Nose normal. No mucosal edema or rhinorrhea.      Right Sinus: No maxillary sinus tenderness or frontal sinus tenderness.      Left Sinus: No maxillary sinus tenderness or frontal sinus tenderness.      Mouth/Throat:      Mouth: No oral lesions.      Pharynx: Uvula midline. No oropharyngeal exudate.   Eyes:      General: Lids are normal.         Right eye: No discharge.         Left eye: No discharge.      Conjunctiva/sclera: Conjunctivae normal.      Pupils: Pupils are equal, round, and reactive to light.   Neck:      Musculoskeletal: Normal range of motion and neck supple.      Thyroid: No thyroid mass or thyromegaly. "      Vascular: Carotid bruit present. No JVD.      Trachea: No tracheal deviation.      Comments: + left carotid bruit  - right carotid bruit  Cardiovascular:      Rate and Rhythm: Normal rate and regular rhythm.      Heart sounds: Normal heart sounds. No murmur. No friction rub. No gallop.    Pulmonary:      Effort: Pulmonary effort is normal. No respiratory distress.      Breath sounds: Normal breath sounds. No wheezing or rales.   Chest:      Breasts: Breasts are symmetrical.         Right: No inverted nipple, mass, nipple discharge, skin change or tenderness.         Left: No inverted nipple, mass, nipple discharge, skin change or tenderness.   Abdominal:      General: Bowel sounds are normal. There is no distension.      Palpations: Abdomen is soft. There is no mass.      Tenderness: There is no abdominal tenderness.      Hernia: No hernia is present.   Genitourinary:     Exam position: Supine.      Labia:         Right: No rash, tenderness or lesion.         Left: No rash, tenderness or lesion.       Vagina: Normal. No vaginal discharge.      Adnexa:         Right: No mass, tenderness or fullness.          Left: No mass, tenderness or fullness.        Rectum: Normal.      Comments: Cervix & uterus are surgically absent.  Grade 2 cystocele & rectocele noted.    Musculoskeletal: Normal range of motion.         General: No deformity.   Lymphadenopathy:      Cervical: No cervical adenopathy.      Lower Body: No right inguinal adenopathy. No left inguinal adenopathy.   Skin:     General: Skin is warm and dry.      Findings: No lesion or rash.   Neurological:      Mental Status: She is alert and oriented to person, place, and time.      Cranial Nerves: No cranial nerve deficit.      Sensory: No sensory deficit.      Gait: Gait normal.      Deep Tendon Reflexes: Reflexes are normal and symmetric.   Psychiatric:         Speech: Speech normal.         Behavior: Behavior normal.         Thought Content: Thought content  normal.         Judgment: Judgment normal.         No visits with results within 30 Day(s) from this visit.   Latest known visit with results is:   Telephone on 01/11/2021   Component Date Value Ref Range Status   • WBC 01/29/2021 5.7  3.4 - 10.8 x10E3/uL Final   • RBC 01/29/2021 4.53  3.77 - 5.28 x10E6/uL Final   • Hemoglobin 01/29/2021 12.7  11.1 - 15.9 g/dL Final   • Hematocrit 01/29/2021 38.9  34.0 - 46.6 % Final   • MCV 01/29/2021 86  79 - 97 fL Final   • MCH 01/29/2021 28.0  26.6 - 33.0 pg Final   • MCHC 01/29/2021 32.6  31.5 - 35.7 g/dL Final   • RDW 01/29/2021 14.0  11.7 - 15.4 % Final   • Platelets 01/29/2021 345  150 - 450 x10E3/uL Final   • Neutrophil Rel % 01/29/2021 66  Not Estab. % Final   • Lymphocyte Rel % 01/29/2021 21  Not Estab. % Final   • Monocyte Rel % 01/29/2021 9  Not Estab. % Final   • Eosinophil Rel % 01/29/2021 3  Not Estab. % Final   • Basophil Rel % 01/29/2021 1  Not Estab. % Final   • Neutrophils Absolute 01/29/2021 3.8  1.4 - 7.0 x10E3/uL Final   • Lymphocytes Absolute 01/29/2021 1.2  0.7 - 3.1 x10E3/uL Final   • Monocytes Absolute 01/29/2021 0.5  0.1 - 0.9 x10E3/uL Final   • Eosinophils Absolute 01/29/2021 0.2  0.0 - 0.4 x10E3/uL Final   • Basophils Absolute 01/29/2021 0.1  0.0 - 0.2 x10E3/uL Final   • Immature Granulocyte Rel % 01/29/2021 0  Not Estab. % Final   • Immature Grans Absolute 01/29/2021 0.0  0.0 - 0.1 x10E3/uL Final   • Glucose 01/29/2021 106* 65 - 99 mg/dL Final   • BUN 01/29/2021 13  8 - 27 mg/dL Final   • Creatinine 01/29/2021 0.90  0.57 - 1.00 mg/dL Final   • eGFR Non  Am 01/29/2021 62  >59 mL/min/1.73 Final   • eGFR African Am 01/29/2021 71  >59 mL/min/1.73 Final   • BUN/Creatinine Ratio 01/29/2021 14  12 - 28 Final   • Sodium 01/29/2021 137  134 - 144 mmol/L Final   • Potassium 01/29/2021 4.1  3.5 - 5.2 mmol/L Final   • Chloride 01/29/2021 99  96 - 106 mmol/L Final   • Total CO2 01/29/2021 24  20 - 29 mmol/L Final   • Calcium 01/29/2021 10.3  8.7 - 10.3 mg/dL  Final   • Total Protein 01/29/2021 6.6  6.0 - 8.5 g/dL Final   • Albumin 01/29/2021 4.3  3.7 - 4.7 g/dL Final   • Globulin 01/29/2021 2.3  1.5 - 4.5 g/dL Final   • A/G Ratio 01/29/2021 1.9  1.2 - 2.2 Final   • Total Bilirubin 01/29/2021 0.3  0.0 - 1.2 mg/dL Final   • Alkaline Phosphatase 01/29/2021 68  39 - 117 IU/L Final   • AST (SGOT) 01/29/2021 17  0 - 40 IU/L Final   • ALT (SGPT) 01/29/2021 14  0 - 32 IU/L Final   • Hemoglobin A1C 01/29/2021 5.8* 4.8 - 5.6 % Final    Comment:          Prediabetes: 5.7 - 6.4           Diabetes: >6.4           Glycemic control for adults with diabetes: <7.0     • Total Cholesterol 01/29/2021 164  100 - 199 mg/dL Final   • Triglycerides 01/29/2021 195* 0 - 149 mg/dL Final   • HDL Cholesterol 01/29/2021 54  >39 mg/dL Final   • VLDL Cholesterol Johnathan 01/29/2021 32  5 - 40 mg/dL Final   • LDL Chol Calc (NIH) 01/29/2021 78  0 - 99 mg/dL Final   • Vitamin B-12 01/29/2021 1,805* 232 - 1,245 pg/mL Final   • TSH 01/29/2021 3.110  0.450 - 4.500 uIU/mL Final   • 1,25-Dihydroxy, Vitamin D 01/29/2021 31.7  19.9 - 79.3 pg/mL Final   ]    Assessment/Plan   Patient Self-Management and Personalized Health Advice  The patient has been provided with information about: diet, exercise, weight management and prevention of cardiac or vascular disease and preventive services including:   · Annual Wellness Visit (AWV)  · Bone Density Measurements  · Screening Mammography   · Screening Pelvic Examinations (Includes a clinical breast examination).    Discussed the patient's BMI with her. The BMI is above average; BMI management plan is completed.    Diagnoses and all orders for this visit:    1. Encounter for general adult medical examination with abnormal findings (Primary)  Comments:  Discussed age appropriate health maintenance. Recommended Shingrix vaccines.     2. Hypertensive heart disease without heart failure  Assessment & Plan:  Mild on 2009 echo.  Discussed importance of keeping blood pressure well  controlled.      3. Benign hypertension  Assessment & Plan:  Stable.  Continue hydrochlorothiazide and losartan.      4. Mixed hyperlipidemia  Assessment & Plan:  Cisco mildly elevated, however LDL, VLDL, and HDL are at goal.  Continue simvastatin.  Discussed better dietary control.      5. Prediabetes  Assessment & Plan:  A1c is still within prediabetes range.  Discussed dietary control.  Encouraged her to increase physical activity.  Repeat in 6 months.      6. Right wrist pain  -     XR Wrist 3+ View Right; Future    7. Osteopenia of multiple sites  Assessment & Plan:  Continue weightbearing exercise and increased calcium intake.  She is tolerating oral bisphosphonate.  Will call with DEXA results and further recommendations.    Orders:  -     DEXA Bone Density Axial; Future    8. Allergic rhinitis, unspecified seasonality, unspecified trigger  Assessment & Plan:  She has been off immunotherapy for about a year.  Doing well.  Continue follow-up with Dr. Benito.      9. Bruit of left carotid artery  Assessment & Plan:  Continued mild bruit on the left.    2018 ultrasound that was done for the same reason showed mild plaquing but no significant stenosis.  She continues to be asymptomatic.  Continue control of risk factors.      10. Vitamin B12 deficiency  Assessment & Plan:  Vitamin B12 level is actually high.  Advised her to cut her dose in half.  She will start taking B12 every other day.      11. Gastroesophageal reflux disease without esophagitis  Assessment & Plan:  She continues to have control as long she takes her PPI.  If she misses a dose she does have recurrence of symptoms.  She is followed by GI.  Last EGD was in 2019.      12. Cystocele with rectocele  Assessment & Plan:  No worsening.  She continues to decline referral to GYN to discuss treatment options.      13. Mixed stress and urge urinary incontinence  Assessment & Plan:  No worsening.  She declines trial of OAB medication.      14. Generalized  osteoarthritis  Assessment & Plan:  She is having worsening pain in the right wrist.  She has been using Tylenol as needed.  She has seen [facility] and [facility] in the past for bilateral hand and wrist pain, she declines referral back to them at this time.      15. Mild persistent asthma without complication  Assessment & Plan:  Stable.  This is managed by Dr. Benito and Dr. Hodge.        16. Primary insomnia  Assessment & Plan:  Better.      17. Breast cancer screening by mammogram  -     Mammo Screening Digital Tomosynthesis Bilateral With CAD; Future    18. Screening for colon cancer  Comments:  Next Cologuard test due in 2022.     19. Screening for vaginal cancer      Orders:  Orders Placed This Encounter   Procedures   • XR Wrist 3+ View Right   • Mammo Screening Digital Tomosynthesis Bilateral With CAD   • DEXA Bone Density Axial       Follow Up:  Return in about 6 months (around 8/23/2021) for Follow-Up hypertension.     An After Visit Summary and PPPS with all of these plans were given to the patient.

## 2021-02-23 NOTE — ASSESSMENT & PLAN NOTE
She has been off immunotherapy for about a year.  Doing well.  Continue follow-up with Dr. Benito.

## 2021-02-23 NOTE — ASSESSMENT & PLAN NOTE
Vitamin B12 level is actually high.  Advised her to cut her dose in half.  She will start taking B12 every other day.

## 2021-02-23 NOTE — ASSESSMENT & PLAN NOTE
She continues to have control as long she takes her PPI.  If she misses a dose she does have recurrence of symptoms.  She is followed by GI.  Last EGD was in 2019.

## 2021-02-23 NOTE — ASSESSMENT & PLAN NOTE
Continue weightbearing exercise and increased calcium intake.  She is tolerating oral bisphosphonate.  Will call with DEXA results and further recommendations.

## 2021-02-23 NOTE — ASSESSMENT & PLAN NOTE
Cisco mildly elevated, however LDL, VLDL, and HDL are at goal.  Continue simvastatin.  Discussed better dietary control.

## 2021-03-03 RX ORDER — LOSARTAN POTASSIUM 100 MG/1
TABLET ORAL
Qty: 90 TABLET | Refills: 1 | Status: SHIPPED | OUTPATIENT
Start: 2021-03-03 | End: 2021-08-27

## 2021-03-15 ENCOUNTER — HOSPITAL ENCOUNTER (OUTPATIENT)
Dept: BONE DENSITY | Facility: HOSPITAL | Age: 76
Discharge: HOME | End: 2021-03-15

## 2021-03-15 ENCOUNTER — HOSPITAL ENCOUNTER (OUTPATIENT)
Dept: MAMMOGRAPHY | Facility: HOSPITAL | Age: 76
Discharge: HOME | End: 2021-03-15

## 2021-03-15 ENCOUNTER — APPOINTMENT (OUTPATIENT)
Dept: MAMMOGRAPHY | Facility: HOSPITAL | Age: 76
End: 2021-03-15

## 2021-03-15 DIAGNOSIS — M85.89 OSTEOPENIA OF MULTIPLE SITES: ICD-10-CM

## 2021-03-15 DIAGNOSIS — Z12.31 BREAST CANCER SCREENING BY MAMMOGRAM: ICD-10-CM

## 2021-03-15 PROCEDURE — 77063 BREAST TOMOSYNTHESIS BI: CPT

## 2021-03-15 PROCEDURE — 77067 SCR MAMMO BI INCL CAD: CPT

## 2021-03-15 PROCEDURE — 77080 DXA BONE DENSITY AXIAL: CPT

## 2021-03-17 ENCOUNTER — OFFICE (OUTPATIENT)
Dept: RURAL CLINIC 3 | Facility: CLINIC | Age: 76
End: 2021-03-17

## 2021-03-17 VITALS
DIASTOLIC BLOOD PRESSURE: 69 MMHG | SYSTOLIC BLOOD PRESSURE: 135 MMHG | HEART RATE: 72 BPM | HEIGHT: 62 IN | WEIGHT: 144 LBS

## 2021-03-17 DIAGNOSIS — K21.9 GASTRO-ESOPHAGEAL REFLUX DISEASE WITHOUT ESOPHAGITIS: ICD-10-CM

## 2021-03-17 PROCEDURE — 99213 OFFICE O/P EST LOW 20 MIN: CPT | Performed by: NURSE PRACTITIONER

## 2021-03-17 RX ORDER — PANTOPRAZOLE 40 MG/1
TABLET, DELAYED RELEASE ORAL
Qty: 90 | Refills: 0 | Status: ACTIVE

## 2021-03-29 RX ORDER — HYDROCHLOROTHIAZIDE 25 MG/1
TABLET ORAL
Qty: 90 TABLET | Refills: 1 | Status: SHIPPED | OUTPATIENT
Start: 2021-03-29 | End: 2021-09-23

## 2021-04-14 ENCOUNTER — OFFICE VISIT (OUTPATIENT)
Dept: PULMONOLOGY | Facility: HOSPITAL | Age: 76
End: 2021-04-14

## 2021-04-14 VITALS
WEIGHT: 142.4 LBS | HEART RATE: 84 BPM | DIASTOLIC BLOOD PRESSURE: 84 MMHG | OXYGEN SATURATION: 98 % | RESPIRATION RATE: 15 BRPM | HEIGHT: 62 IN | TEMPERATURE: 97.1 F | SYSTOLIC BLOOD PRESSURE: 145 MMHG | BODY MASS INDEX: 26.2 KG/M2

## 2021-04-14 DIAGNOSIS — F51.01 PRIMARY INSOMNIA: ICD-10-CM

## 2021-04-14 DIAGNOSIS — J45.30 MILD PERSISTENT ASTHMA WITHOUT COMPLICATION: Primary | ICD-10-CM

## 2021-04-14 PROCEDURE — G0463 HOSPITAL OUTPT CLINIC VISIT: HCPCS

## 2021-05-12 RX ORDER — SIMVASTATIN 40 MG
TABLET ORAL
Qty: 90 TABLET | Refills: 0 | Status: SHIPPED | OUTPATIENT
Start: 2021-05-12 | End: 2021-08-13

## 2021-07-20 ENCOUNTER — HOSPITAL ENCOUNTER (OUTPATIENT)
Dept: GENERAL RADIOLOGY | Facility: HOSPITAL | Age: 76
Discharge: HOME | End: 2021-07-20
Admitting: ORTHOPAEDIC SURGERY

## 2021-07-20 DIAGNOSIS — Z47.1 AFTERCARE FOLLOWING JOINT REPLACEMENT: ICD-10-CM

## 2021-07-20 PROCEDURE — 73560 X-RAY EXAM OF KNEE 1 OR 2: CPT

## 2021-08-13 RX ORDER — SIMVASTATIN 40 MG
TABLET ORAL
Qty: 90 TABLET | Refills: 0 | Status: SHIPPED | OUTPATIENT
Start: 2021-08-13 | End: 2021-11-29

## 2021-08-26 ENCOUNTER — OFFICE VISIT (OUTPATIENT)
Dept: FAMILY MEDICINE CLINIC | Facility: CLINIC | Age: 76
End: 2021-08-26

## 2021-08-26 VITALS
HEART RATE: 88 BPM | TEMPERATURE: 97.2 F | OXYGEN SATURATION: 97 % | BODY MASS INDEX: 26.83 KG/M2 | WEIGHT: 145.8 LBS | SYSTOLIC BLOOD PRESSURE: 148 MMHG | RESPIRATION RATE: 16 BRPM | HEIGHT: 62 IN | DIASTOLIC BLOOD PRESSURE: 66 MMHG

## 2021-08-26 DIAGNOSIS — R73.03 PREDIABETES: ICD-10-CM

## 2021-08-26 DIAGNOSIS — E53.8 VITAMIN B12 DEFICIENCY: ICD-10-CM

## 2021-08-26 DIAGNOSIS — I10 BENIGN HYPERTENSION: Primary | ICD-10-CM

## 2021-08-26 PROCEDURE — 99213 OFFICE O/P EST LOW 20 MIN: CPT | Performed by: NURSE PRACTITIONER

## 2021-08-26 NOTE — ASSESSMENT & PLAN NOTE
Elevated today. Prior reading was normal.   She will return in 2-4 weeks for a nurse visit to have BP rechecked.

## 2021-08-26 NOTE — PROGRESS NOTES
Chief Complaint  Hypertension    Subjective          History of Present Illness  Patient presents for follow-up on hypertension, Vitamin B 12 deficiency, and prediabetes. She has had some back spasms lately, but is seeing a chiropractor and these are improving. Otherwise she feels well and has no complaints.         Current Outpatient Medications:   •  acetaminophen (TYLENOL) 325 MG tablet, Take  by mouth., Disp: , Rfl:   •  Calcium Carbonate-Vitamin D3 (CALCIUM 600-D) 600-400 MG-UNIT tablet, CALCIUM 600-D 600-400 MG-UNIT TABS, Disp: , Rfl:   •  cetirizine (zyrTEC) 10 MG tablet, Take 10 mg by mouth Daily., Disp: , Rfl:   •  guaiFENesin (MUCINEX) 600 MG 12 hr tablet, Take 1,200 mg by mouth 2 (Two) Times a Day., Disp: , Rfl:   •  hydroCHLOROthiazide (HYDRODIURIL) 25 MG tablet, TAKE ONE TABLET BY MOUTH ONCE DAILY, Disp: 90 tablet, Rfl: 1  •  levalbuterol (XOPENEX HFA) 45 MCG/ACT inhaler, XOPENEX HFA 45 MCG/ACT AERO, Disp: , Rfl:   •  losartan (COZAAR) 100 MG tablet, TAKE ONE TABLET BY MOUTH ONCE DAILY FOR FOR BLOOD PRESSURE, Disp: 90 tablet, Rfl: 1  •  mometasone-formoterol (DULERA) 100-5 MCG/ACT inhaler, DULERA 100-5 MCG/ACT AERO, Disp: , Rfl:   •  montelukast (SINGULAIR) 10 MG tablet, TAKE ONE TABLET BY MOUTH ONCE DAILY, Disp: 90 tablet, Rfl: 3  •  Multiple Vitamins-Minerals (CENTRUM SILVER) tablet, CENTRUM SILVER TABS, Disp: , Rfl:   •  pantoprazole (PROTONIX) 40 MG EC tablet, Take 40 mg by mouth Daily., Disp: , Rfl:   •  risedronate (ACTONEL) 150 MG tablet, TAKE ONE TABLET BY MOUTH ONCE A MONTH IN THE MORNING 30 MINUTES BEFORE FIRST FOOD WITH 8 OUNCES OF WATER, Disp: 3 tablet, Rfl: 3  •  simvastatin (ZOCOR) 40 MG tablet, TAKE ONE TABLET BY MOUTH AT BEDTIME FOR CHOLESTEROL, Disp: 90 tablet, Rfl: 0  •  Tiotropium Bromide Monohydrate (Spiriva Respimat) 1.25 MCG/ACT aerosol solution inhaler, Inhale 2 puffs Daily., Disp: 1 inhaler, Rfl: 12     Review of Systems   Constitutional: Negative for activity change, appetite  "change, diaphoresis, fatigue, unexpected weight gain and unexpected weight loss.   Respiratory: Negative for cough, shortness of breath and wheezing.    Cardiovascular: Negative for chest pain, palpitations and leg swelling.   Gastrointestinal: Negative for abdominal pain, constipation, diarrhea, nausea and vomiting.   Neurological: Negative for dizziness, syncope and headache.   Psychiatric/Behavioral: Negative for depressed mood.        Objective   Vital Signs:   Vitals:    08/26/21 0952 08/26/21 1016   BP: 153/60 148/66   BP Location: Right arm    Patient Position: Sitting    Cuff Size: Adult    Pulse: 88    Resp: 16    Temp: 97.2 °F (36.2 °C)    TempSrc: Infrared    SpO2: 97%    Weight: 66.1 kg (145 lb 12.8 oz)    Height: 157.5 cm (62\")      Body mass index is 26.67 kg/m².      Physical Exam  Constitutional:       Appearance: She is well-developed.   Neck:      Thyroid: No thyromegaly.      Vascular: Carotid bruit (left-sided) present.      Trachea: Trachea normal.   Cardiovascular:      Rate and Rhythm: Normal rate and regular rhythm.      Heart sounds: Normal heart sounds. No murmur heard.   No friction rub. No gallop.    Pulmonary:      Effort: Pulmonary effort is normal.      Breath sounds: Normal breath sounds. No wheezing or rales.   Musculoskeletal:         General: Normal range of motion.      Cervical back: Normal range of motion and neck supple.   Lymphadenopathy:      Cervical: No cervical adenopathy.   Skin:     General: Skin is warm and dry.   Neurological:      Mental Status: She is alert and oriented to person, place, and time.   Psychiatric:         Behavior: Behavior normal.         Thought Content: Thought content normal.         Judgment: Judgment normal.          Result Review :   The following data was reviewed by: AGNES Reilly on 08/26/2021:    No visits with results within 1 Day(s) from this visit.   Latest known visit with results is:   Telephone on 01/11/2021   Component Date " Value Ref Range Status   • WBC 01/29/2021 5.7  3.4 - 10.8 x10E3/uL Final   • RBC 01/29/2021 4.53  3.77 - 5.28 x10E6/uL Final   • Hemoglobin 01/29/2021 12.7  11.1 - 15.9 g/dL Final   • Hematocrit 01/29/2021 38.9  34.0 - 46.6 % Final   • MCV 01/29/2021 86  79 - 97 fL Final   • MCH 01/29/2021 28.0  26.6 - 33.0 pg Final   • MCHC 01/29/2021 32.6  31.5 - 35.7 g/dL Final   • RDW 01/29/2021 14.0  11.7 - 15.4 % Final   • Platelets 01/29/2021 345  150 - 450 x10E3/uL Final   • Neutrophil Rel % 01/29/2021 66  Not Estab. % Final   • Lymphocyte Rel % 01/29/2021 21  Not Estab. % Final   • Monocyte Rel % 01/29/2021 9  Not Estab. % Final   • Eosinophil Rel % 01/29/2021 3  Not Estab. % Final   • Basophil Rel % 01/29/2021 1  Not Estab. % Final   • Neutrophils Absolute 01/29/2021 3.8  1.4 - 7.0 x10E3/uL Final   • Lymphocytes Absolute 01/29/2021 1.2  0.7 - 3.1 x10E3/uL Final   • Monocytes Absolute 01/29/2021 0.5  0.1 - 0.9 x10E3/uL Final   • Eosinophils Absolute 01/29/2021 0.2  0.0 - 0.4 x10E3/uL Final   • Basophils Absolute 01/29/2021 0.1  0.0 - 0.2 x10E3/uL Final   • Immature Granulocyte Rel % 01/29/2021 0  Not Estab. % Final   • Immature Grans Absolute 01/29/2021 0.0  0.0 - 0.1 x10E3/uL Final   • Glucose 01/29/2021 106* 65 - 99 mg/dL Final   • BUN 01/29/2021 13  8 - 27 mg/dL Final   • Creatinine 01/29/2021 0.90  0.57 - 1.00 mg/dL Final   • eGFR Non  Am 01/29/2021 62  >59 mL/min/1.73 Final   • eGFR African Am 01/29/2021 71  >59 mL/min/1.73 Final   • BUN/Creatinine Ratio 01/29/2021 14  12 - 28 Final   • Sodium 01/29/2021 137  134 - 144 mmol/L Final   • Potassium 01/29/2021 4.1  3.5 - 5.2 mmol/L Final   • Chloride 01/29/2021 99  96 - 106 mmol/L Final   • Total CO2 01/29/2021 24  20 - 29 mmol/L Final   • Calcium 01/29/2021 10.3  8.7 - 10.3 mg/dL Final   • Total Protein 01/29/2021 6.6  6.0 - 8.5 g/dL Final   • Albumin 01/29/2021 4.3  3.7 - 4.7 g/dL Final   • Globulin 01/29/2021 2.3  1.5 - 4.5 g/dL Final   • A/G Ratio 01/29/2021 1.9   1.2 - 2.2 Final   • Total Bilirubin 01/29/2021 0.3  0.0 - 1.2 mg/dL Final   • Alkaline Phosphatase 01/29/2021 68  39 - 117 IU/L Final   • AST (SGOT) 01/29/2021 17  0 - 40 IU/L Final   • ALT (SGPT) 01/29/2021 14  0 - 32 IU/L Final   • Hemoglobin A1C 01/29/2021 5.8* 4.8 - 5.6 % Final    Comment:          Prediabetes: 5.7 - 6.4           Diabetes: >6.4           Glycemic control for adults with diabetes: <7.0     • Total Cholesterol 01/29/2021 164  100 - 199 mg/dL Final   • Triglycerides 01/29/2021 195* 0 - 149 mg/dL Final   • HDL Cholesterol 01/29/2021 54  >39 mg/dL Final   • VLDL Cholesterol Johnathan 01/29/2021 32  5 - 40 mg/dL Final   • LDL Chol Calc (NIH) 01/29/2021 78  0 - 99 mg/dL Final   • Vitamin B-12 01/29/2021 1,805* 232 - 1,245 pg/mL Final   • TSH 01/29/2021 3.110  0.450 - 4.500 uIU/mL Final   • 1,25-Dihydroxy, Vitamin D 01/29/2021 31.7  19.9 - 79.3 pg/mL Final     Data reviewed: Consultant notes 4/14/21 Pulmonology Note; 3/17/21 GI Note            Assessment and Plan    Diagnoses and all orders for this visit:    1. Benign hypertension (Primary)  Assessment & Plan:  Elevated today. Prior reading was normal.   She will return in 2-4 weeks for a nurse visit to have BP rechecked.     Orders:  -     CBC & Differential  -     Comprehensive Metabolic Panel    2. Vitamin B12 deficiency  -     Vitamin B12    3. Prediabetes  -     Hemoglobin A1c          Follow Up   Return in about 26 weeks (around 2/24/2022) for Medicare Wellness  (Nurse visit - 2-4 weeks for BP recheck).    Patient was given instructions and counseling regarding her condition and health maintenance advice. Please see specific information in the After Visit Summary.     AGNES Reilly 8/26/2021 10:24 EDT  This note was electronically signed.

## 2021-08-27 RX ORDER — LOSARTAN POTASSIUM 100 MG/1
TABLET ORAL
Qty: 90 TABLET | Refills: 1 | Status: SHIPPED | OUTPATIENT
Start: 2021-08-27 | End: 2022-02-25

## 2021-08-28 LAB
ALBUMIN SERPL-MCNC: 4.6 G/DL (ref 3.7–4.7)
ALBUMIN/GLOB SERPL: 2.1 {RATIO} (ref 1.2–2.2)
ALP SERPL-CCNC: 87 IU/L (ref 48–121)
ALT SERPL-CCNC: 13 IU/L (ref 0–32)
AST SERPL-CCNC: 18 IU/L (ref 0–40)
BASOPHILS # BLD AUTO: 0.1 X10E3/UL (ref 0–0.2)
BASOPHILS NFR BLD AUTO: 1 %
BILIRUB SERPL-MCNC: 0.3 MG/DL (ref 0–1.2)
BUN SERPL-MCNC: 13 MG/DL (ref 8–27)
BUN/CREAT SERPL: 13 (ref 12–28)
CALCIUM SERPL-MCNC: 10.3 MG/DL (ref 8.7–10.3)
CHLORIDE SERPL-SCNC: 94 MMOL/L (ref 96–106)
CO2 SERPL-SCNC: 25 MMOL/L (ref 20–29)
CREAT SERPL-MCNC: 1.01 MG/DL (ref 0.57–1)
EOSINOPHIL # BLD AUTO: 0.2 X10E3/UL (ref 0–0.4)
EOSINOPHIL NFR BLD AUTO: 2 %
ERYTHROCYTE [DISTWIDTH] IN BLOOD BY AUTOMATED COUNT: 13 % (ref 11.7–15.4)
GLOBULIN SER CALC-MCNC: 2.2 G/DL (ref 1.5–4.5)
GLUCOSE SERPL-MCNC: 119 MG/DL (ref 65–99)
HBA1C MFR BLD: 6 % (ref 4.8–5.6)
HCT VFR BLD AUTO: 39.8 % (ref 34–46.6)
HGB BLD-MCNC: 12.4 G/DL (ref 11.1–15.9)
IMM GRANULOCYTES # BLD AUTO: 0 X10E3/UL (ref 0–0.1)
IMM GRANULOCYTES NFR BLD AUTO: 0 %
LYMPHOCYTES # BLD AUTO: 1.1 X10E3/UL (ref 0.7–3.1)
LYMPHOCYTES NFR BLD AUTO: 12 %
MCH RBC QN AUTO: 28.1 PG (ref 26.6–33)
MCHC RBC AUTO-ENTMCNC: 31.2 G/DL (ref 31.5–35.7)
MCV RBC AUTO: 90 FL (ref 79–97)
MONOCYTES # BLD AUTO: 0.6 X10E3/UL (ref 0.1–0.9)
MONOCYTES NFR BLD AUTO: 6 %
NEUTROPHILS # BLD AUTO: 7.6 X10E3/UL (ref 1.4–7)
NEUTROPHILS NFR BLD AUTO: 79 %
PLATELET # BLD AUTO: 381 X10E3/UL (ref 150–450)
POTASSIUM SERPL-SCNC: 3.4 MMOL/L (ref 3.5–5.2)
PROT SERPL-MCNC: 6.8 G/DL (ref 6–8.5)
RBC # BLD AUTO: 4.42 X10E6/UL (ref 3.77–5.28)
SODIUM SERPL-SCNC: 134 MMOL/L (ref 134–144)
VIT B12 SERPL-MCNC: 1048 PG/ML (ref 232–1245)
WBC # BLD AUTO: 9.7 X10E3/UL (ref 3.4–10.8)

## 2021-09-09 ENCOUNTER — CLINICAL SUPPORT (OUTPATIENT)
Dept: FAMILY MEDICINE CLINIC | Facility: CLINIC | Age: 76
End: 2021-09-09

## 2021-09-09 VITALS — DIASTOLIC BLOOD PRESSURE: 64 MMHG | SYSTOLIC BLOOD PRESSURE: 143 MMHG

## 2021-09-09 NOTE — PROGRESS NOTES
Let patient know that her BP is still a little high. Have her schedule an appointment with me in about a month and if it is still high we will discuss medication changes.

## 2021-09-23 RX ORDER — HYDROCHLOROTHIAZIDE 25 MG/1
TABLET ORAL
Qty: 90 TABLET | Refills: 0 | Status: SHIPPED | OUTPATIENT
Start: 2021-09-23 | End: 2021-12-22

## 2021-10-08 ENCOUNTER — TELEPHONE (OUTPATIENT)
Dept: FAMILY MEDICINE CLINIC | Facility: CLINIC | Age: 76
End: 2021-10-08

## 2021-10-08 DIAGNOSIS — E87.6 HYPOKALEMIA: Primary | ICD-10-CM

## 2021-10-08 NOTE — TELEPHONE ENCOUNTER
----- Message from Nancy Arteaga MA sent at 8/31/2021  9:49 AM EDT -----  Regarding: LAB DUE  BMP for acute renal insufficiency and hypokalemia

## 2021-10-12 LAB
BUN SERPL-MCNC: 12 MG/DL (ref 8–27)
BUN/CREAT SERPL: 13 (ref 12–28)
CALCIUM SERPL-MCNC: 10.3 MG/DL (ref 8.7–10.3)
CHLORIDE SERPL-SCNC: 98 MMOL/L (ref 96–106)
CO2 SERPL-SCNC: 23 MMOL/L (ref 20–29)
CREAT SERPL-MCNC: 0.89 MG/DL (ref 0.57–1)
GLUCOSE SERPL-MCNC: 85 MG/DL (ref 65–99)
POTASSIUM SERPL-SCNC: 4.7 MMOL/L (ref 3.5–5.2)
SODIUM SERPL-SCNC: 136 MMOL/L (ref 134–144)

## 2021-10-13 ENCOUNTER — OFFICE VISIT (OUTPATIENT)
Dept: FAMILY MEDICINE CLINIC | Facility: CLINIC | Age: 76
End: 2021-10-13

## 2021-10-13 VITALS
WEIGHT: 142 LBS | DIASTOLIC BLOOD PRESSURE: 62 MMHG | BODY MASS INDEX: 26.13 KG/M2 | RESPIRATION RATE: 18 BRPM | TEMPERATURE: 97.5 F | HEIGHT: 62 IN | SYSTOLIC BLOOD PRESSURE: 172 MMHG | HEART RATE: 79 BPM | OXYGEN SATURATION: 98 %

## 2021-10-13 DIAGNOSIS — I10 BENIGN HYPERTENSION: Primary | ICD-10-CM

## 2021-10-13 DIAGNOSIS — Z23 NEED FOR VACCINATION: ICD-10-CM

## 2021-10-13 PROCEDURE — 99213 OFFICE O/P EST LOW 20 MIN: CPT | Performed by: NURSE PRACTITIONER

## 2021-10-13 PROCEDURE — 90662 IIV NO PRSV INCREASED AG IM: CPT | Performed by: NURSE PRACTITIONER

## 2021-10-13 PROCEDURE — G0008 ADMIN INFLUENZA VIRUS VAC: HCPCS | Performed by: NURSE PRACTITIONER

## 2021-10-13 RX ORDER — METOPROLOL SUCCINATE 25 MG/1
25 TABLET, EXTENDED RELEASE ORAL DAILY
Qty: 30 TABLET | Refills: 1 | Status: SHIPPED | OUTPATIENT
Start: 2021-10-13 | End: 2021-12-08

## 2021-10-13 NOTE — PROGRESS NOTES
Chief Complaint  Hypertension    Subjective          History of Present Illness  Patient is following up on hypertension. At her 8/26/21 appointment her blood pressure was elevated. She returned for a nurse visit on 9/9/21 and it remained elevated. She denies chest pain, headaches, or palpitations.        Current Outpatient Medications:   •  acetaminophen (TYLENOL) 325 MG tablet, Take  by mouth., Disp: , Rfl:   •  Calcium Carbonate-Vitamin D3 (CALCIUM 600-D) 600-400 MG-UNIT tablet, CALCIUM 600-D 600-400 MG-UNIT TABS, Disp: , Rfl:   •  cetirizine (zyrTEC) 10 MG tablet, Take 10 mg by mouth Daily., Disp: , Rfl:   •  guaiFENesin (MUCINEX) 600 MG 12 hr tablet, Take 1,200 mg by mouth 2 (Two) Times a Day., Disp: , Rfl:   •  hydroCHLOROthiazide (HYDRODIURIL) 25 MG tablet, TAKE ONE TABLET BY MOUTH ONCE DAILY, Disp: 90 tablet, Rfl: 0  •  levalbuterol (XOPENEX HFA) 45 MCG/ACT inhaler, XOPENEX HFA 45 MCG/ACT AERO, Disp: , Rfl:   •  losartan (COZAAR) 100 MG tablet, TAKE ONE TABLET BY MOUTH ONCE DAILY FOR BLOOD PRESSURE, Disp: 90 tablet, Rfl: 1  •  mometasone-formoterol (DULERA) 100-5 MCG/ACT inhaler, DULERA 100-5 MCG/ACT AERO, Disp: , Rfl:   •  montelukast (SINGULAIR) 10 MG tablet, TAKE ONE TABLET BY MOUTH ONCE DAILY, Disp: 90 tablet, Rfl: 3  •  Multiple Vitamins-Minerals (CENTRUM SILVER) tablet, CENTRUM SILVER TABS, Disp: , Rfl:   •  pantoprazole (PROTONIX) 40 MG EC tablet, Take 40 mg by mouth Daily., Disp: , Rfl:   •  risedronate (ACTONEL) 150 MG tablet, TAKE ONE TABLET BY MOUTH ONCE A MONTH IN THE MORNING 30 MINUTES BEFORE FIRST FOOD WITH 8 OUNCES OF WATER, Disp: 3 tablet, Rfl: 3  •  simvastatin (ZOCOR) 40 MG tablet, TAKE ONE TABLET BY MOUTH AT BEDTIME FOR CHOLESTEROL, Disp: 90 tablet, Rfl: 0  •  Tiotropium Bromide Monohydrate (Spiriva Respimat) 1.25 MCG/ACT aerosol solution inhaler, Inhale 2 puffs Daily., Disp: 1 inhaler, Rfl: 12  •  metoprolol succinate XL (Toprol XL) 25 MG 24 hr tablet, Take 1 tablet by mouth Daily., Disp:  "30 tablet, Rfl: 1     Review of Systems   Constitutional: Negative for activity change, appetite change, diaphoresis, fatigue, unexpected weight gain and unexpected weight loss.   Eyes: Negative for blurred vision and visual disturbance.   Respiratory: Negative for cough, shortness of breath and wheezing.    Cardiovascular: Negative for chest pain, palpitations and leg swelling.   Gastrointestinal: Negative for abdominal pain, constipation, diarrhea, nausea and vomiting.   Endocrine: Negative for cold intolerance and heat intolerance.   Genitourinary: Negative for frequency.   Musculoskeletal: Negative for myalgias.   Neurological: Negative for dizziness, syncope and headache.   Psychiatric/Behavioral: Negative for depressed mood.        Objective   Vital Signs:   Vitals:    10/13/21 0910 10/13/21 0933   BP: 178/79 172/62   BP Location: Left arm    Patient Position: Sitting    Cuff Size: Adult    Pulse: 79    Resp: 18    Temp: 97.5 °F (36.4 °C)    TempSrc: Infrared    SpO2: 98%    Weight: 64.4 kg (142 lb)    Height: 157.5 cm (62\")      Body mass index is 25.97 kg/m².    Physical Exam  Constitutional:       Appearance: She is well-developed.   Neck:      Thyroid: No thyromegaly.      Vascular: Carotid bruit (left-sided) present.      Trachea: Trachea normal.   Cardiovascular:      Rate and Rhythm: Normal rate and regular rhythm.      Heart sounds: Normal heart sounds. No murmur heard.  No friction rub. No gallop.    Pulmonary:      Effort: Pulmonary effort is normal.      Breath sounds: Normal breath sounds. No wheezing or rales.   Musculoskeletal:         General: Normal range of motion.      Cervical back: Neck supple.   Lymphadenopathy:      Cervical: No cervical adenopathy.   Skin:     General: Skin is warm and dry.   Neurological:      Mental Status: She is alert and oriented to person, place, and time.   Psychiatric:         Behavior: Behavior normal.         Thought Content: Thought content normal.         " Judgment: Judgment normal.          Result Review :   The following data was reviewed by: AGNES Reilly on 10/13/2021:    No visits with results within 1 Day(s) from this visit.   Latest known visit with results is:   Telephone on 10/08/2021   Component Date Value Ref Range Status   • Glucose 10/11/2021 85  65 - 99 mg/dL Final   • BUN 10/11/2021 12  8 - 27 mg/dL Final   • Creatinine 10/11/2021 0.89  0.57 - 1.00 mg/dL Final   • eGFR Non  Am 10/11/2021 63  >59 mL/min/1.73 Final   • eGFR African Am 10/11/2021 72  >59 mL/min/1.73 Final    Comment: **[facility] currently reports eGFR in compliance with the current**    recommendations of the [employer]. [facility] will    update reporting as new guidelines are published from the NKF-ASN    Task force.     • BUN/Creatinine Ratio 10/11/2021 13  12 - 28 Final   • Sodium 10/11/2021 136  134 - 144 mmol/L Final   • Potassium 10/11/2021 4.7  3.5 - 5.2 mmol/L Final   • Chloride 10/11/2021 98  96 - 106 mmol/L Final   • Total CO2 10/11/2021 23  20 - 29 mmol/L Final   • Calcium 10/11/2021 10.3  8.7 - 10.3 mg/dL Final                 Assessment and Plan    Diagnoses and all orders for this visit:    1. Benign hypertension (Primary)  Assessment & Plan:  Continued elevation. Add on beta blocker.   Follow-up in 6 weeks, sooner for problems.       2. Need for vaccination  -     Fluzone High-Dose 65+yrs    Other orders  -     metoprolol succinate XL (Toprol XL) 25 MG 24 hr tablet; Take 1 tablet by mouth Daily.  Dispense: 30 tablet; Refill: 1          Follow Up   Return in about 6 weeks (around 11/24/2021) for Follow-Up hypertension.    Patient was given instructions and counseling regarding her condition and health maintenance advice. Please see specific information in the After Visit Summary.     AGNES Reilly 10/13/2021 09:34 EDT  This note was electronically signed.

## 2021-11-29 RX ORDER — SIMVASTATIN 40 MG
TABLET ORAL
Qty: 90 TABLET | Refills: 0 | Status: SHIPPED | OUTPATIENT
Start: 2021-11-29 | End: 2022-03-01

## 2021-12-08 ENCOUNTER — OFFICE VISIT (OUTPATIENT)
Dept: FAMILY MEDICINE CLINIC | Facility: CLINIC | Age: 76
End: 2021-12-08

## 2021-12-08 VITALS
HEART RATE: 76 BPM | SYSTOLIC BLOOD PRESSURE: 152 MMHG | HEIGHT: 62 IN | DIASTOLIC BLOOD PRESSURE: 64 MMHG | WEIGHT: 139 LBS | TEMPERATURE: 97.9 F | BODY MASS INDEX: 25.58 KG/M2 | OXYGEN SATURATION: 98 % | RESPIRATION RATE: 16 BRPM

## 2021-12-08 DIAGNOSIS — I10 BENIGN HYPERTENSION: Primary | ICD-10-CM

## 2021-12-08 PROCEDURE — 99213 OFFICE O/P EST LOW 20 MIN: CPT | Performed by: NURSE PRACTITIONER

## 2021-12-08 RX ORDER — METOPROLOL SUCCINATE 25 MG/1
25 TABLET, EXTENDED RELEASE ORAL DAILY
Qty: 90 TABLET | Refills: 0 | Status: SHIPPED | OUTPATIENT
Start: 2021-12-08 | End: 2022-02-24

## 2021-12-08 NOTE — ASSESSMENT & PLAN NOTE
In office reading is still elevated, but home readings have been normal.   Will hold on any further changes at this time.   She already has an appointment scheduled in February and we will reevaluate this at that time.

## 2021-12-08 NOTE — PROGRESS NOTES
Chief Complaint  Hypertension    Subjective          History of Present Illness  Patient is following up on hypertension. At her 8/26/21 appointment her blood pressure was elevated. She returned for a nurse visit on 9/9/21 and it remained elevated. On 10/13/21 it was still elevated and Metoprolol XL 25 mg daily was added to her meds. She denies chest pain, headaches, or palpitations.    Her blood pressure was 136/63 at home this morning.         Current Outpatient Medications:   •  acetaminophen (TYLENOL) 325 MG tablet, Take  by mouth., Disp: , Rfl:   •  Calcium Carbonate-Vitamin D3 (CALCIUM 600-D) 600-400 MG-UNIT tablet, CALCIUM 600-D 600-400 MG-UNIT TABS, Disp: , Rfl:   •  cetirizine (zyrTEC) 10 MG tablet, Take 10 mg by mouth Daily., Disp: , Rfl:   •  guaiFENesin (MUCINEX) 600 MG 12 hr tablet, Take 1,200 mg by mouth 2 (Two) Times a Day., Disp: , Rfl:   •  hydroCHLOROthiazide (HYDRODIURIL) 25 MG tablet, TAKE ONE TABLET BY MOUTH ONCE DAILY, Disp: 90 tablet, Rfl: 0  •  levalbuterol (XOPENEX HFA) 45 MCG/ACT inhaler, XOPENEX HFA 45 MCG/ACT AERO, Disp: , Rfl:   •  losartan (COZAAR) 100 MG tablet, TAKE ONE TABLET BY MOUTH ONCE DAILY FOR BLOOD PRESSURE, Disp: 90 tablet, Rfl: 1  •  metoprolol succinate XL (Toprol XL) 25 MG 24 hr tablet, Take 1 tablet by mouth Daily., Disp: 90 tablet, Rfl: 0  •  mometasone-formoterol (DULERA) 100-5 MCG/ACT inhaler, DULERA 100-5 MCG/ACT AERO, Disp: , Rfl:   •  montelukast (SINGULAIR) 10 MG tablet, TAKE ONE TABLET BY MOUTH ONCE DAILY, Disp: 90 tablet, Rfl: 3  •  Multiple Vitamins-Minerals (CENTRUM SILVER) tablet, CENTRUM SILVER TABS, Disp: , Rfl:   •  pantoprazole (PROTONIX) 40 MG EC tablet, Take 40 mg by mouth Daily., Disp: , Rfl:   •  risedronate (ACTONEL) 150 MG tablet, TAKE ONE TABLET BY MOUTH ONCE A MONTH IN THE MORNING 30 MINUTES BEFORE FIRST FOOD WITH 8 OUNCES OF WATER, Disp: 3 tablet, Rfl: 3  •  simvastatin (ZOCOR) 40 MG tablet, TAKE ONE TABLET BY MOUTH AT BEDTIME FOR CHOLESTEROL,  "Disp: 90 tablet, Rfl: 0  •  Tiotropium Bromide Monohydrate (Spiriva Respimat) 1.25 MCG/ACT aerosol solution inhaler, Inhale 2 puffs Daily., Disp: 1 inhaler, Rfl: 12     Review of Systems   Constitutional: Negative for activity change, appetite change, diaphoresis, fatigue, unexpected weight gain and unexpected weight loss.   Respiratory: Negative for cough, shortness of breath and wheezing.    Cardiovascular: Negative for chest pain, palpitations and leg swelling.   Gastrointestinal: Negative for nausea and vomiting.   Neurological: Negative for dizziness, syncope and headache.        Objective   Vital Signs:   Vitals:    12/08/21 0903 12/08/21 0913   BP: 159/63 152/64   BP Location: Right arm    Patient Position: Sitting    Cuff Size: Adult    Pulse: 76    Resp: 16    Temp: 97.9 °F (36.6 °C)    TempSrc: Infrared    SpO2: 98%    Weight: 63 kg (139 lb)    Height: 157.5 cm (62\")      Body mass index is 25.42 kg/m².      Physical Exam  Constitutional:       Appearance: She is well-developed.   Neck:      Thyroid: No thyromegaly.      Vascular: Carotid bruit (left-sided) present.      Trachea: Trachea normal.   Cardiovascular:      Rate and Rhythm: Normal rate and regular rhythm.      Heart sounds: Normal heart sounds. No murmur heard.  No friction rub. No gallop.    Pulmonary:      Effort: Pulmonary effort is normal.      Breath sounds: Normal breath sounds. No wheezing or rales.   Musculoskeletal:         General: Normal range of motion.      Cervical back: Neck supple.   Lymphadenopathy:      Cervical: No cervical adenopathy.   Skin:     General: Skin is warm and dry.   Neurological:      Mental Status: She is alert and oriented to person, place, and time.   Psychiatric:         Behavior: Behavior normal.         Thought Content: Thought content normal.         Judgment: Judgment normal.          Result Review :   The following data was reviewed by: AGNES Reilly on 12/08/2021:  CMP    CMP 1/29/21 8/26/21 " 10/11/21   Glucose 106 (A) 119 (A) 85   BUN 13 13 12   Creatinine 0.90 1.01 (A) 0.89   eGFR Non  Am 62 54 (A) 63   eGFR  Am 71 62 72   Sodium 137 134 136   Potassium 4.1 3.4 (A) 4.7   Chloride 99 94 (A) 98   Calcium 10.3 10.3 10.3   Total Protein 6.6 6.8    Albumin 4.3 4.6    Globulin 2.3 2.2    Total Bilirubin 0.3 0.3    Alkaline Phosphatase 68 87    AST (SGOT) 17 18    ALT (SGPT) 14 13    (A) Abnormal value       Comments are available for some flowsheets but are not being displayed.           CBC    CBC 1/29/21 8/26/21   WBC 5.7 9.7   RBC 4.53 4.42   Hemoglobin 12.7 12.4   Hematocrit 38.9 39.8   MCV 86 90   MCH 28.0 28.1   MCHC 32.6 31.2 (A)   RDW 14.0 13.0   Platelets 345 381   (A) Abnormal value                 Assessment and Plan    Diagnoses and all orders for this visit:    1. Benign hypertension (Primary)  Assessment & Plan:  In office reading is still elevated, but home readings have been normal.   Will hold on any further changes at this time.   She already has an appointment scheduled in February and we will reevaluate this at that time.       Other orders  -     metoprolol succinate XL (Toprol XL) 25 MG 24 hr tablet; Take 1 tablet by mouth Daily.  Dispense: 90 tablet; Refill: 0          Follow Up   No follow-ups on file.    Patient was given instructions and counseling regarding her condition and health maintenance advice. Please see specific information in the After Visit Summary.     AGNES Reilly 12/8/2021 09:19 EST  This note was electronically signed.

## 2021-12-22 RX ORDER — HYDROCHLOROTHIAZIDE 25 MG/1
TABLET ORAL
Qty: 90 TABLET | Refills: 0 | Status: SHIPPED | OUTPATIENT
Start: 2021-12-22 | End: 2022-03-23

## 2022-02-08 RX ORDER — MONTELUKAST SODIUM 10 MG/1
TABLET ORAL
Qty: 90 TABLET | Refills: 0 | Status: SHIPPED | OUTPATIENT
Start: 2022-02-08 | End: 2022-04-28 | Stop reason: SDUPTHER

## 2022-02-08 RX ORDER — RISEDRONATE SODIUM 150 MG/1
TABLET, FILM COATED ORAL
Qty: 3 TABLET | Refills: 0 | Status: SHIPPED | OUTPATIENT
Start: 2022-02-08 | End: 2022-04-28 | Stop reason: SDUPTHER

## 2022-02-22 ENCOUNTER — TELEPHONE (OUTPATIENT)
Dept: FAMILY MEDICINE CLINIC | Facility: CLINIC | Age: 77
End: 2022-02-22

## 2022-02-22 DIAGNOSIS — E78.2 MIXED HYPERLIPIDEMIA: ICD-10-CM

## 2022-02-22 DIAGNOSIS — E53.8 VITAMIN B12 DEFICIENCY: ICD-10-CM

## 2022-02-22 DIAGNOSIS — M85.89 OSTEOPENIA OF MULTIPLE SITES: ICD-10-CM

## 2022-02-22 DIAGNOSIS — I10 BENIGN HYPERTENSION: Primary | ICD-10-CM

## 2022-02-22 DIAGNOSIS — R73.03 PREDIABETES: ICD-10-CM

## 2022-02-23 LAB
ALBUMIN SERPL-MCNC: 4.6 G/DL (ref 3.7–4.7)
ALBUMIN/GLOB SERPL: 2.3 {RATIO} (ref 1.2–2.2)
ALP SERPL-CCNC: 69 IU/L (ref 44–121)
ALT SERPL-CCNC: 13 IU/L (ref 0–32)
AST SERPL-CCNC: 17 IU/L (ref 0–40)
BASOPHILS # BLD AUTO: 0.1 X10E3/UL (ref 0–0.2)
BASOPHILS NFR BLD AUTO: 1 %
BILIRUB SERPL-MCNC: 0.3 MG/DL (ref 0–1.2)
BUN SERPL-MCNC: 14 MG/DL (ref 8–27)
BUN/CREAT SERPL: 16 (ref 12–28)
CALCIUM SERPL-MCNC: 10.4 MG/DL (ref 8.7–10.3)
CHLORIDE SERPL-SCNC: 99 MMOL/L (ref 96–106)
CHOLEST SERPL-MCNC: 143 MG/DL (ref 100–199)
CO2 SERPL-SCNC: 24 MMOL/L (ref 20–29)
CREAT SERPL-MCNC: 0.89 MG/DL (ref 0.57–1)
EOSINOPHIL # BLD AUTO: 0.2 X10E3/UL (ref 0–0.4)
EOSINOPHIL NFR BLD AUTO: 2 %
ERYTHROCYTE [DISTWIDTH] IN BLOOD BY AUTOMATED COUNT: 13.5 % (ref 11.7–15.4)
GLOBULIN SER CALC-MCNC: 2 G/DL (ref 1.5–4.5)
GLUCOSE SERPL-MCNC: 95 MG/DL (ref 65–99)
HBA1C MFR BLD: 5.8 % (ref 4.8–5.6)
HCT VFR BLD AUTO: 37.2 % (ref 34–46.6)
HDLC SERPL-MCNC: 51 MG/DL
HGB BLD-MCNC: 12.3 G/DL (ref 11.1–15.9)
IMM GRANULOCYTES # BLD AUTO: 0 X10E3/UL (ref 0–0.1)
IMM GRANULOCYTES NFR BLD AUTO: 0 %
LDLC SERPL CALC-MCNC: 63 MG/DL (ref 0–99)
LYMPHOCYTES # BLD AUTO: 1.3 X10E3/UL (ref 0.7–3.1)
LYMPHOCYTES NFR BLD AUTO: 17 %
MCH RBC QN AUTO: 28.7 PG (ref 26.6–33)
MCHC RBC AUTO-ENTMCNC: 33.1 G/DL (ref 31.5–35.7)
MCV RBC AUTO: 87 FL (ref 79–97)
MONOCYTES # BLD AUTO: 0.6 X10E3/UL (ref 0.1–0.9)
MONOCYTES NFR BLD AUTO: 8 %
NEUTROPHILS # BLD AUTO: 5.5 X10E3/UL (ref 1.4–7)
NEUTROPHILS NFR BLD AUTO: 72 %
PLATELET # BLD AUTO: 341 X10E3/UL (ref 150–450)
POTASSIUM SERPL-SCNC: 5.1 MMOL/L (ref 3.5–5.2)
PROT SERPL-MCNC: 6.6 G/DL (ref 6–8.5)
RBC # BLD AUTO: 4.28 X10E6/UL (ref 3.77–5.28)
SODIUM SERPL-SCNC: 134 MMOL/L (ref 134–144)
TRIGL SERPL-MCNC: 177 MG/DL (ref 0–149)
VLDLC SERPL CALC-MCNC: 29 MG/DL (ref 5–40)
WBC # BLD AUTO: 7.6 X10E3/UL (ref 3.4–10.8)

## 2022-02-24 ENCOUNTER — OFFICE VISIT (OUTPATIENT)
Dept: FAMILY MEDICINE CLINIC | Facility: CLINIC | Age: 77
End: 2022-02-24

## 2022-02-24 VITALS
DIASTOLIC BLOOD PRESSURE: 72 MMHG | HEART RATE: 69 BPM | HEIGHT: 62 IN | RESPIRATION RATE: 20 BRPM | WEIGHT: 142 LBS | TEMPERATURE: 97.5 F | SYSTOLIC BLOOD PRESSURE: 161 MMHG | BODY MASS INDEX: 26.13 KG/M2 | OXYGEN SATURATION: 96 %

## 2022-02-24 DIAGNOSIS — R09.89 BRUIT OF LEFT CAROTID ARTERY: ICD-10-CM

## 2022-02-24 DIAGNOSIS — I10 BENIGN HYPERTENSION: ICD-10-CM

## 2022-02-24 DIAGNOSIS — H04.123 DRY EYE SYNDROME OF BILATERAL LACRIMAL GLANDS: ICD-10-CM

## 2022-02-24 DIAGNOSIS — Z00.01 ENCOUNTER FOR GENERAL ADULT MEDICAL EXAMINATION WITH ABNORMAL FINDINGS: Primary | ICD-10-CM

## 2022-02-24 DIAGNOSIS — E78.2 MIXED HYPERLIPIDEMIA: ICD-10-CM

## 2022-02-24 DIAGNOSIS — M85.89 OSTEOPENIA OF MULTIPLE SITES: ICD-10-CM

## 2022-02-24 DIAGNOSIS — R73.03 PREDIABETES: ICD-10-CM

## 2022-02-24 DIAGNOSIS — J20.9 ACUTE BRONCHITIS, UNSPECIFIED ORGANISM: ICD-10-CM

## 2022-02-24 DIAGNOSIS — F51.01 PRIMARY INSOMNIA: ICD-10-CM

## 2022-02-24 DIAGNOSIS — Z12.31 BREAST CANCER SCREENING BY MAMMOGRAM: ICD-10-CM

## 2022-02-24 DIAGNOSIS — M15.9 GENERALIZED OSTEOARTHRITIS: ICD-10-CM

## 2022-02-24 DIAGNOSIS — J30.9 ALLERGIC RHINITIS, UNSPECIFIED SEASONALITY, UNSPECIFIED TRIGGER: ICD-10-CM

## 2022-02-24 DIAGNOSIS — K21.9 GASTROESOPHAGEAL REFLUX DISEASE WITHOUT ESOPHAGITIS: ICD-10-CM

## 2022-02-24 DIAGNOSIS — E53.8 VITAMIN B12 DEFICIENCY: ICD-10-CM

## 2022-02-24 DIAGNOSIS — I11.9 HYPERTENSIVE HEART DISEASE WITHOUT HEART FAILURE: ICD-10-CM

## 2022-02-24 DIAGNOSIS — R05.9 COUGH: ICD-10-CM

## 2022-02-24 DIAGNOSIS — N39.46 MIXED STRESS AND URGE URINARY INCONTINENCE: ICD-10-CM

## 2022-02-24 LAB
EXPIRATION DATE: NORMAL
FLUAV AG NPH QL: NEGATIVE
FLUBV AG NPH QL: NEGATIVE
INTERNAL CONTROL: NORMAL
Lab: NORMAL

## 2022-02-24 PROCEDURE — 87804 INFLUENZA ASSAY W/OPTIC: CPT | Performed by: NURSE PRACTITIONER

## 2022-02-24 PROCEDURE — 99214 OFFICE O/P EST MOD 30 MIN: CPT | Performed by: NURSE PRACTITIONER

## 2022-02-24 PROCEDURE — 1160F RVW MEDS BY RX/DR IN RCRD: CPT | Performed by: NURSE PRACTITIONER

## 2022-02-24 PROCEDURE — 1126F AMNT PAIN NOTED NONE PRSNT: CPT | Performed by: NURSE PRACTITIONER

## 2022-02-24 PROCEDURE — 1170F FXNL STATUS ASSESSED: CPT | Performed by: NURSE PRACTITIONER

## 2022-02-24 PROCEDURE — G0439 PPPS, SUBSEQ VISIT: HCPCS | Performed by: NURSE PRACTITIONER

## 2022-02-24 RX ORDER — METOPROLOL SUCCINATE 50 MG/1
50 TABLET, EXTENDED RELEASE ORAL DAILY
Qty: 30 TABLET | Refills: 0 | Status: SHIPPED | OUTPATIENT
Start: 2022-02-24 | End: 2022-03-29

## 2022-02-24 RX ORDER — AMOXICILLIN AND CLAVULANATE POTASSIUM 875; 125 MG/1; MG/1
1 TABLET, FILM COATED ORAL 2 TIMES DAILY
Qty: 20 TABLET | Refills: 0 | Status: SHIPPED | OUTPATIENT
Start: 2022-02-24 | End: 2022-03-06

## 2022-02-24 NOTE — PROGRESS NOTES
The ABCs of the Annual Wellness Visit  Subsequent Medicare Wellness Visit    Chief Complaint   Patient presents with   • Medicare Wellness-subsequent   • Hypertension   • Hyperlipidemia   • Heartburn   • Prediabetes      Subjective    History of Present Illness:  Cara Castañeda is a 76 y.o. female who presents for a Subsequent Medicare Wellness Visit.  Patient is also following up multiple chronic medical conditions, including hypertension, hyperlipidemia, GERD, and prediabetes. She has also had a loose, wet cough for the last 4-5 days. She is not short of breath or wheezing. She denies nasal congestion, rhinitis, sore throat. She has had three COVID vaccines and an influenza vaccine. No known COVID contacts.       The following portions of the patient's history were reviewed and   updated as appropriate: allergies, current medications, past family history, past medical history, past social history, past surgical history and problem list.    Compared to one year ago, the patient feels her physical   health is the same.    Compared to one year ago, the patient feels her mental   health is the same.    Recent Hospitalizations:  She was not admitted to the hospital during the last year.       Current Medical Providers:  Patient Care Team:  Akanksha Mauricio APRN as PCP - General  Akanksha Mauricio APRN as PCP - Family Medicine  Fernando Oliva MD as Consulting Physician (Gastroenterology)  Marcin Benito MD as Consulting Physician (Allergy)  Natty Hodge MD as Consulting Physician (Pulmonary Disease)    Outpatient Medications Prior to Visit   Medication Sig Dispense Refill   • acetaminophen (TYLENOL) 325 MG tablet Take  by mouth.     • Calcium Carbonate-Vitamin D3 (CALCIUM 600-D) 600-400 MG-UNIT tablet CALCIUM 600-D 600-400 MG-UNIT TABS     • cetirizine (zyrTEC) 10 MG tablet Take 10 mg by mouth Daily.     • guaiFENesin (MUCINEX) 600 MG 12 hr tablet Take 1,200 mg by mouth 2 (Two) Times a Day.     •  hydroCHLOROthiazide (HYDRODIURIL) 25 MG tablet TAKE ONE TABLET BY MOUTH ONCE DAILY 90 tablet 0   • levalbuterol (XOPENEX HFA) 45 MCG/ACT inhaler XOPENEX HFA 45 MCG/ACT AERO     • mometasone-formoterol (DULERA) 100-5 MCG/ACT inhaler DULERA 100-5 MCG/ACT AERO     • montelukast (SINGULAIR) 10 MG tablet TAKE ONE TABLET BY MOUTH ONCE DAILY 90 tablet 0   • Multiple Vitamins-Minerals (CENTRUM SILVER) tablet CENTRUM SILVER TABS     • pantoprazole (PROTONIX) 40 MG EC tablet Take 40 mg by mouth Daily.     • risedronate (ACTONEL) 150 MG tablet TAKE ONE TABLET BY MOUTH ONCE A MONTH IN THE MORNING 30 MINUTES BEFORE FIRST FOOD - TAKE WITH 8 OUNCES OF WATER 3 tablet 0   • simvastatin (ZOCOR) 40 MG tablet TAKE ONE TABLET BY MOUTH AT BEDTIME FOR CHOLESTEROL 90 tablet 0   • losartan (COZAAR) 100 MG tablet TAKE ONE TABLET BY MOUTH ONCE DAILY FOR BLOOD PRESSURE 90 tablet 1   • metoprolol succinate XL (Toprol XL) 25 MG 24 hr tablet Take 1 tablet by mouth Daily. 90 tablet 0   • Tiotropium Bromide Monohydrate (Spiriva Respimat) 1.25 MCG/ACT aerosol solution inhaler Inhale 2 puffs Daily. 1 inhaler 12     No facility-administered medications prior to visit.       No opioid medication identified on active medication list. I have reviewed chart for other potential  high risk medication/s and harmful drug interactions in the elderly.          Aspirin is not on active medication list.  Aspirin use is not indicated based on review of current medical condition/s. Risk of harm outweighs potential benefits.  .    Patient Active Problem List   Diagnosis   • Allergic rhinitis   • Asthma   • Benign hypertension   • Gastroesophageal reflux disease   • Generalized osteoarthritis   • Hyperlipidemia   • Hypertensive heart disease   • Prediabetes   • Insomnia   • Other long term (current) drug therapy   • Osteopenia   • Mixed stress and urge urinary incontinence   • Vitamin B12 deficiency   • Dry eye syndrome of bilateral lacrimal glands   • Bilateral  pseudophakia   • Hypermetropia, bilateral   • Pseudophakia   • Regular astigmatism, left eye   • Presence of intraocular lens   • Epiretinal membrane (ERM) of left eye   • Puckering of macula, bilateral   • Tear film insufficiency   • Bruit of left carotid artery   • Cystocele with rectocele     Advance Care Planning  Advance Directive is on file.  ACP discussion was held with the patient during this visit. Patient has an advance directive in EMR which is still valid.     Review of Systems   Constitutional: Positive for fatigue. Negative for appetite change, chills, diaphoresis, fever, unexpected weight gain and unexpected weight loss.   HENT: Negative for congestion, ear discharge, ear pain, hearing loss, mouth sores, nosebleeds, postnasal drip, rhinorrhea, sinus pressure, sneezing, sore throat, swollen glands and trouble swallowing.    Eyes: Positive for itching (occasional). Negative for blurred vision, double vision, pain, discharge and redness.   Respiratory: Positive for cough (loose, wet x 4-5 days). Negative for apnea, choking, shortness of breath, wheezing and stridor.    Cardiovascular: Negative for chest pain, palpitations and leg swelling.   Gastrointestinal: Negative for abdominal distention, abdominal pain, anal bleeding, blood in stool, constipation, diarrhea, nausea, rectal pain, vomiting and indigestion.   Endocrine: Negative for cold intolerance, heat intolerance, polydipsia, polyphagia and polyuria.   Genitourinary: Positive for urinary incontinence (no change, mild). Negative for breast discharge, breast lump, breast pain, difficulty urinating, dysuria, flank pain, frequency, genital sores, hematuria, pelvic pain, urgency, vaginal bleeding, vaginal discharge and vaginal pain.   Musculoskeletal: Positive for arthralgias (no change) and back pain (mild). Negative for gait problem, joint swelling, myalgias, neck pain and neck stiffness.   Skin: Negative for color change, rash and skin lesions.  "  Neurological: Negative for dizziness, tremors, seizures, syncope, speech difficulty, weakness, light-headedness, numbness, headache, memory problem and confusion.   Hematological: Negative for adenopathy. Does not bruise/bleed easily.   Psychiatric/Behavioral: Negative for self-injury, sleep disturbance, suicidal ideas, depressed mood and stress. The patient is not nervous/anxious.         Objective    Vitals:    02/24/22 1107   BP: 161/72   BP Location: Left arm   Patient Position: Sitting   Cuff Size: Adult   Pulse: 69   Resp: 20   Temp: 97.5 °F (36.4 °C)   TempSrc: Temporal   SpO2: 96%   Weight: 64.4 kg (142 lb)   Height: 157.5 cm (62\")   PainSc: 0-No pain     BMI Readings from Last 1 Encounters:   02/24/22 25.97 kg/m²   BMI is above normal parameters. Recommendations include: exercise counseling and nutrition counseling    Does the patient have evidence of cognitive impairment? No    Physical Exam  Vitals reviewed.   Constitutional:       General: She is not in acute distress.     Appearance: She is well-developed.   HENT:      Head: Normocephalic and atraumatic.      Right Ear: Tympanic membrane, ear canal and external ear normal. Tympanic membrane is not injected, erythematous, retracted or bulging.      Left Ear: Tympanic membrane, ear canal and external ear normal. Tympanic membrane is not injected, erythematous, retracted or bulging.      Nose: Nose normal. No mucosal edema or rhinorrhea.      Right Sinus: No maxillary sinus tenderness or frontal sinus tenderness.      Left Sinus: No maxillary sinus tenderness or frontal sinus tenderness.      Mouth/Throat:      Mouth: No oral lesions.      Pharynx: Uvula midline. No oropharyngeal exudate or posterior oropharyngeal erythema.   Eyes:      General: Lids are normal.         Right eye: No discharge.         Left eye: No discharge.      Conjunctiva/sclera: Conjunctivae normal.      Pupils: Pupils are equal, round, and reactive to light.   Neck:      Thyroid: " No thyroid mass or thyromegaly.      Vascular: Carotid bruit (left-sided) present. No JVD.      Trachea: Trachea normal. No tracheal deviation.   Cardiovascular:      Rate and Rhythm: Normal rate and regular rhythm.      Heart sounds: Normal heart sounds. No murmur heard.  No friction rub. No gallop.    Pulmonary:      Effort: Pulmonary effort is normal. No respiratory distress.      Breath sounds: Normal breath sounds. No wheezing or rales.   Chest:   Breasts: Breasts are symmetrical.      Right: No inverted nipple, mass, nipple discharge, skin change or tenderness.      Left: No inverted nipple, mass, nipple discharge, skin change or tenderness.       Abdominal:      General: Bowel sounds are normal. There is no distension.      Palpations: Abdomen is soft. There is no mass.      Tenderness: There is no abdominal tenderness.      Hernia: No hernia is present.   Genitourinary:     Comments: Patient declined examination  Musculoskeletal:         General: No deformity. Normal range of motion.      Cervical back: Normal range of motion and neck supple.   Lymphadenopathy:      Cervical: No cervical adenopathy.   Skin:     General: Skin is warm and dry.      Findings: No lesion or rash.   Neurological:      Mental Status: She is alert and oriented to person, place, and time.      Cranial Nerves: No cranial nerve deficit.      Sensory: No sensory deficit.      Gait: Gait normal.      Deep Tendon Reflexes: Reflexes are normal and symmetric.   Psychiatric:         Speech: Speech normal.         Behavior: Behavior normal.         Thought Content: Thought content normal.         Judgment: Judgment normal.         HEALTH RISK ASSESSMENT    Smoking Status:  Social History     Tobacco Use   Smoking Status Never Smoker   Smokeless Tobacco Never Used     Alcohol Consumption:  Social History     Substance and Sexual Activity   Alcohol Use No     Fall Risk Screen:    STEADI Fall Risk Assessment was completed, and patient is at LOW  risk for falls.Assessment completed on:2/24/2022    Depression Screening:  PHQ-2/PHQ-9 Depression Screening 2/24/2022   Little interest or pleasure in doing things 0   Feeling down, depressed, or hopeless 0   Trouble falling or staying asleep, or sleeping too much 0   Feeling tired or having little energy 0   Poor appetite or overeating 0   Feeling bad about yourself - or that you are a failure or have let yourself or your family down 0   Trouble concentrating on things, such as reading the newspaper or watching television 0   Moving or speaking so slowly that other people could have noticed. Or the opposite - being so fidgety or restless that you have been moving around a lot more than usual 0   Thoughts that you would be better off dead, or of hurting yourself in some way 0   Total Score 0   If you checked off any problems, how difficult have these problems made it for you to do your work, take care of things at home, or get along with other people? Not difficult at all       Health Habits and Functional and Cognitive Screening:  Functional & Cognitive Status 2/24/2022   Do you have difficulty preparing food and eating? No   Do you have difficulty bathing yourself, getting dressed or grooming yourself? No   Do you have difficulty using the toilet? No   Do you have difficulty moving around from place to place? No   Do you have trouble with steps or getting out of a bed or a chair? No   Current Diet Well Balanced Diet   Dental Exam Up to date   Eye Exam Up to date   Exercise (times per week) 0 times per week   Current Exercises Include No Regular Exercise   Current Exercise Activities Include -   Do you need help using the phone?  No   Are you deaf or do you have serious difficulty hearing?  No   Do you need help with transportation? No   Do you need help shopping? No   Do you need help preparing meals?  No   Do you need help with housework?  No   Do you need help with laundry? No   Do you need help taking your  medications? No   Do you need help managing money? No   Do you ever drive or ride in a car without wearing a seat belt? No   Have you felt unusual stress, anger or loneliness in the last month? No   Who do you live with? Spouse   If you need help, do you have trouble finding someone available to you? No   Have you been bothered in the last four weeks by sexual problems? No   Do you have difficulty concentrating, remembering or making decisions? No       Age-appropriate Screening Schedule:  Refer to the list below for future screening recommendations based on patient's age, sex and/or medical conditions. Orders for these recommended tests are listed in the plan section. The patient has been provided with a written plan.    Health Maintenance   Topic Date Due   • ZOSTER VACCINE (2 of 3) 02/24/2023 (Originally 3/20/2015)   • LIPID PANEL  02/22/2023   • DXA SCAN  03/15/2023   • TDAP/TD VACCINES (3 - Tdap) 01/14/2026   • INFLUENZA VACCINE  Completed              Reviewed by AGNES Kumar today:    Lab Results   Component Value Date    WBC 7.6 02/22/2022    HGB 12.3 02/22/2022    HCT 37.2 02/22/2022    MCV 87 02/22/2022     02/22/2022     Lab Results   Component Value Date    GLUCOSE 95 02/22/2022    BUN 14 02/22/2022    CREATININE 0.89 02/22/2022    EGFRIFNONA 62 02/22/2022    EGFRIFAFRI 72 02/22/2022    BCR 16 02/22/2022    K 5.1 02/22/2022    CO2 24 02/22/2022    CALCIUM 10.4 (H) 02/22/2022    PROTENTOTREF 6.6 02/22/2022    ALBUMIN 4.6 02/22/2022    LABIL2 2.3 (H) 02/22/2022    AST 17 02/22/2022    ALT 13 02/22/2022     Lab Results   Component Value Date    CHOL 152 02/07/2019    CHLPL 143 02/22/2022    TRIG 177 (H) 02/22/2022    HDL 51 02/22/2022    LDL 63 02/22/2022     Lab Results   Component Value Date    HGBA1C 5.8 (H) 02/22/2022     Lab Results   Component Value Date    TSH 3.110 01/29/2021     Office Visit on 02/24/2022   Component Date Value Ref Range Status   • Rapid Influenza A Ag 02/24/2022  Negative  Negative Final   • Rapid Influenza B Ag 02/24/2022 Negative  Negative Final   • Internal Control 02/24/2022 Passed  Passed Final   • Lot Number 02/24/2022 [id number]   Final   • Expiration Date 02/24/2022 [id number]   Final   • SARS-CoV-2, STEPHY 02/24/2022 Not Detected  Not Detected Final    Comment: This nucleic acid amplification test was developed and its performance  characteristics determined by [employer]. Nucleic acid  amplification tests include RT-PCR and TMA. This test has not been  FDA cleared or approved. This test has been authorized by FDA under  an Emergency Use Authorization (EUA). This test is only authorized  for the duration of time the declaration that circumstances exist  justifying the authorization of the emergency use of in vitro  diagnostic tests for detection of SARS-CoV-2 virus and/or diagnosis  of COVID-19 infection under section 564(b)(1) of the Act, 21 U.S.C.  360bbb-3(b) (1), unless the authorization is terminated or revoked  sooner.  When diagnostic testing is negative, the possibility of a false  negative result should be considered in the context of a patient's  recent exposures and the presence of clinical signs and symptoms  consistent with COVID-19. An individual without symptoms of COVID-19  and who is not shedding SARS-CoV-2 virus wo                           uld expect to have a  negative (not detected) result in this assay.     • [facility] SARS-COV-2, STEPHY 2 DAY TAT 02/24/2022 Performed   Final       Assessment/Plan   CMS Preventative Services Quick Reference  Risk Factors Identified During Encounter  Urinary Incontinence  The above risks/problems have been discussed with the patient.  Follow up actions/plans if indicated are seen below in the Assessment/Plan Section.  Pertinent information has been shared with the patient in the After Visit Summary.    Diagnoses and all orders for this visit:    1. Encounter for general adult medical examination with abnormal  findings (Primary)  Comments:  Discussed age appropriate health maintenance.     2. Acute bronchitis, unspecified organism    3. Benign hypertension  Assessment & Plan:  Continued elevation.   Will increase Metoprolol dose, but will need to monitor heart rate.   She will check BP and HR at home and if she is getting HR in the low 50's or if she experiences lightheadedness or dizziness she will let me know.   Follow-up in 1 month.       4. Mixed hyperlipidemia  Assessment & Plan:  Tolerating statin therapy.   Trigs mildly elevated, but otherwise lipid levels are at goal.       5. Prediabetes  Assessment & Plan:  A1c is still within prediabetes range.  Discussed dietary control.  Encouraged her to increase physical activity.  Repeat in 6 months.      6. Gastroesophageal reflux disease without esophagitis  Assessment & Plan:  She continues to have control as long she takes her PPI.    If she misses a dose she does have recurrence of symptoms.  She is followed by GI.  Last EGD was in 2019.      7. Hypertensive heart disease without heart failure  Assessment & Plan:  Mild. Discussed need for good blood pressure control.       8. Vitamin B12 deficiency  Assessment & Plan:  B12 level was in normal range in August.   Continue current dose of Vitamin B12.       9. Osteopenia of multiple sites  Assessment & Plan:  Continue weightbearing exercise and increased calcium intake.    Oral bisphosphonate was started in Feb 2017. Consider drug holiday depending on 2023 DEXA results.     Orders:  -     Vitamin D 1,25 Dihydroxy    10. Allergic rhinitis, unspecified seasonality, unspecified trigger  Assessment & Plan:  Continue follow-up with Dr. Benito.       11. Bruit of left carotid artery  Assessment & Plan:  2018 ultrasound that was done for the same reason showed mild plaquing, but no significant stenosis.    She continues to be asymptomatic.  Continue control of risk factors.      12. Dry eye syndrome of bilateral lacrimal  glands  Assessment & Plan:  Continue follow-up with eye doctor.       13. Mixed stress and urge urinary incontinence  Assessment & Plan:  No worsening.  She declines trial of OAB medication.      14. Generalized osteoarthritis  Assessment & Plan:  No change.   Tylenol PRN.       15. Primary insomnia  Assessment & Plan:  Doing better.       16. Cough  -     POC Influenza A / B  -     COVID-19,[facility] ROUTINE, NP/OP SWAB IN TRANSPORT MEDIA OR ESWAB 72 HR TAT - Swab, Nasopharynx    17. Breast cancer screening by mammogram  -     Mammo Screening Digital Tomosynthesis Bilateral With CAD; Future    Other orders  -     metoprolol succinate XL (Toprol XL) 50 MG 24 hr tablet; Take 1 tablet by mouth Daily.  Dispense: 30 tablet; Refill: 0  -     amoxicillin-clavulanate (Augmentin) 875-125 MG per tablet; Take 1 tablet by mouth 2 (Two) Times a Day for 10 days.  Dispense: 20 tablet; Refill: 0  -     SARS-CoV-2, STEPHY 2 DAY TAT - ,      Follow Up:   Return in about 1 month (around 3/24/2022) for Follow-Up hypertension.     An After Visit Summary and PPPS were made available to the patient.

## 2022-02-25 LAB
LABCORP SARS-COV-2, NAA 2 DAY TAT: NORMAL
SARS-COV-2 RNA RESP QL NAA+PROBE: NOT DETECTED

## 2022-02-25 RX ORDER — LOSARTAN POTASSIUM 100 MG/1
TABLET ORAL
Qty: 90 TABLET | Refills: 1 | Status: SHIPPED | OUTPATIENT
Start: 2022-02-25 | End: 2022-05-31 | Stop reason: SDUPTHER

## 2022-02-26 LAB
1,25(OH)2D SERPL-MCNC: 32.5 PG/ML (ref 19.9–79.3)
1,25(OH)2D SERPL-MCNC: 53 PG/ML (ref 19.9–79.3)
Lab: NORMAL
WRITTEN AUTHORIZATION: NORMAL

## 2022-02-26 NOTE — ASSESSMENT & PLAN NOTE
Managed by Dr. Benito and Dr. Hodge.   Use albuterol as needed. Report any worsening with current acute illness.

## 2022-02-26 NOTE — ASSESSMENT & PLAN NOTE
Continued elevation.   Will increase Metoprolol dose, but will need to monitor heart rate.   She will check BP and HR at home and if she is getting HR in the low 50's or if she experiences lightheadedness or dizziness she will let me know.   Follow-up in 1 month.

## 2022-02-26 NOTE — ASSESSMENT & PLAN NOTE
2018 ultrasound that was done for the same reason showed mild plaquing, but no significant stenosis.    She continues to be asymptomatic.  Continue control of risk factors.

## 2022-02-26 NOTE — ASSESSMENT & PLAN NOTE
Continue weightbearing exercise and increased calcium intake.    Oral bisphosphonate was started in Feb 2017. Consider drug holiday depending on 2023 DEXA results.

## 2022-02-28 RX ORDER — CHLORPHENIRAMINE/PHENYLPROPAN 8 MG-75 MG
1000 CAPSULE, EXTENDED RELEASE ORAL DAILY
Qty: 90 CAPSULE | Refills: 3 | Status: SHIPPED | OUTPATIENT
Start: 2022-02-28 | End: 2022-05-31 | Stop reason: SDUPTHER

## 2022-03-01 RX ORDER — SIMVASTATIN 40 MG
TABLET ORAL
Qty: 90 TABLET | Refills: 3 | Status: SHIPPED | OUTPATIENT
Start: 2022-03-01 | End: 2022-03-29

## 2022-03-18 ENCOUNTER — HOSPITAL ENCOUNTER (OUTPATIENT)
Dept: MAMMOGRAPHY | Facility: HOSPITAL | Age: 77
Discharge: HOME | End: 2022-03-18
Admitting: NURSE PRACTITIONER

## 2022-03-18 DIAGNOSIS — Z12.31 BREAST CANCER SCREENING BY MAMMOGRAM: ICD-10-CM

## 2022-03-18 PROCEDURE — 77063 BREAST TOMOSYNTHESIS BI: CPT

## 2022-03-18 PROCEDURE — 77067 SCR MAMMO BI INCL CAD: CPT

## 2022-03-23 RX ORDER — HYDROCHLOROTHIAZIDE 25 MG/1
TABLET ORAL
Qty: 90 TABLET | Refills: 0 | Status: SHIPPED | OUTPATIENT
Start: 2022-03-23 | End: 2022-04-28 | Stop reason: SDUPTHER

## 2022-03-29 ENCOUNTER — OFFICE VISIT (OUTPATIENT)
Dept: FAMILY MEDICINE CLINIC | Facility: CLINIC | Age: 77
End: 2022-03-29

## 2022-03-29 VITALS
HEIGHT: 62 IN | BODY MASS INDEX: 25.58 KG/M2 | DIASTOLIC BLOOD PRESSURE: 78 MMHG | OXYGEN SATURATION: 99 % | TEMPERATURE: 97.5 F | HEART RATE: 70 BPM | SYSTOLIC BLOOD PRESSURE: 140 MMHG | RESPIRATION RATE: 16 BRPM | WEIGHT: 139 LBS

## 2022-03-29 DIAGNOSIS — I10 BENIGN HYPERTENSION: Primary | ICD-10-CM

## 2022-03-29 DIAGNOSIS — E78.2 MIXED HYPERLIPIDEMIA: ICD-10-CM

## 2022-03-29 PROCEDURE — 99214 OFFICE O/P EST MOD 30 MIN: CPT | Performed by: NURSE PRACTITIONER

## 2022-03-29 RX ORDER — ROSUVASTATIN CALCIUM 40 MG/1
40 TABLET, COATED ORAL DAILY
Qty: 90 TABLET | Refills: 1 | Status: SHIPPED | OUTPATIENT
Start: 2022-03-29 | End: 2022-05-31 | Stop reason: SDUPTHER

## 2022-03-29 RX ORDER — AMLODIPINE BESYLATE 5 MG/1
5 TABLET ORAL DAILY
Qty: 30 TABLET | Refills: 1 | Status: SHIPPED | OUTPATIENT
Start: 2022-03-29 | End: 2022-04-28 | Stop reason: SDUPTHER

## 2022-03-29 NOTE — ASSESSMENT & PLAN NOTE
Discontinue simvastatin due to possible interaction with amlodipine.  Start rosuvastatin 40mg daily.  Repeat lipids and CMP in 3 months.

## 2022-03-29 NOTE — ASSESSMENT & PLAN NOTE
Hypertension is not improving.  Discontinue beta-blocker secondary to fatigue.  Start calcium channel-blocker.  Follow-up in 1 month.

## 2022-03-29 NOTE — PROGRESS NOTES
Chief Complaint  Hypertension    Subjective          History of Present Illness  Hypertension  Patient presents for follow-up. At her visit last month her blood pressure was elevated and her Metoprolol dose was increased. BP at home 120s-130s/50s-60s and HR in 50s-60s.  She feels her blood pressure increases when she is at medical appointments.  Overall she is feeling well aside from an increase in fatigue since increasing the metoprolol.             Current Outpatient Medications:   •  acetaminophen (TYLENOL) 325 MG tablet, Take  by mouth., Disp: , Rfl:   •  Calcium Carbonate-Vitamin D3 600-400 MG-UNIT tablet, CALCIUM 600-D 600-400 MG-UNIT TABS, Disp: , Rfl:   •  cetirizine (zyrTEC) 10 MG tablet, Take 10 mg by mouth Daily., Disp: , Rfl:   •  Cholecalciferol (EQL Vitamin D3) 25 MCG (1000 UT) capsule, Take 1 capsule by mouth Daily., Disp: 90 capsule, Rfl: 3  •  guaiFENesin (MUCINEX) 600 MG 12 hr tablet, Take 1,200 mg by mouth 2 (Two) Times a Day., Disp: , Rfl:   •  hydroCHLOROthiazide (HYDRODIURIL) 25 MG tablet, TAKE ONE TABLET BY MOUTH ONCE DAILY, Disp: 90 tablet, Rfl: 0  •  levalbuterol (XOPENEX HFA) 45 MCG/ACT inhaler, XOPENEX HFA 45 MCG/ACT AERO, Disp: , Rfl:   •  losartan (COZAAR) 100 MG tablet, TAKE ONE TABLET BY MOUTH ONCE DAILY FOR BLOOD PRESSURE, Disp: 90 tablet, Rfl: 1  •  mometasone-formoterol (DULERA 100) 100-5 MCG/ACT inhaler, DULERA 100-5 MCG/ACT AERO, Disp: , Rfl:   •  montelukast (SINGULAIR) 10 MG tablet, TAKE ONE TABLET BY MOUTH ONCE DAILY, Disp: 90 tablet, Rfl: 0  •  Multiple Vitamins-Minerals (CENTRUM SILVER) tablet, CENTRUM SILVER TABS, Disp: , Rfl:   •  pantoprazole (PROTONIX) 40 MG EC tablet, Take 40 mg by mouth Daily., Disp: , Rfl:   •  risedronate (ACTONEL) 150 MG tablet, TAKE ONE TABLET BY MOUTH ONCE A MONTH IN THE MORNING 30 MINUTES BEFORE FIRST FOOD - TAKE WITH 8 OUNCES OF WATER, Disp: 3 tablet, Rfl: 0  •  amLODIPine (NORVASC) 5 MG tablet, Take 1 tablet by mouth Daily., Disp: 30 tablet,  "Rfl: 1  •  rosuvastatin (Crestor) 40 MG tablet, Take 1 tablet by mouth Daily., Disp: 90 tablet, Rfl: 1     Review of Systems   Constitutional: Positive for fatigue. Negative for activity change.   Respiratory: Negative for chest tightness and shortness of breath.    Cardiovascular: Negative for chest pain, palpitations and leg swelling.   Gastrointestinal: Negative for abdominal distention, abdominal pain, constipation, diarrhea, nausea and vomiting.   Allergic/Immunologic: Positive for environmental allergies.   Neurological: Negative for dizziness, syncope and light-headedness.        Objective   Vital Signs:   Vitals:    03/29/22 1248 03/29/22 1307   BP: (!) 196/78 140/78   BP Location: Right arm    Patient Position: Sitting    Cuff Size: Adult    Pulse: 70    Resp: 16    Temp: 97.5 °F (36.4 °C)    TempSrc: Infrared    SpO2: 99%    Weight: 63 kg (139 lb)    Height: 157.5 cm (62\")      Body mass index is 25.42 kg/m².    Physical Exam  Vitals reviewed.   Constitutional:       Appearance: Normal appearance. She is well-developed and well-groomed.   HENT:      Head: Normocephalic and atraumatic.   Neck:      Thyroid: No thyromegaly.      Vascular: Carotid bruit (left side) present.      Trachea: Trachea normal.   Cardiovascular:      Rate and Rhythm: Normal rate and regular rhythm.      Heart sounds: Normal heart sounds. No murmur heard.    No friction rub. No gallop.   Pulmonary:      Effort: Pulmonary effort is normal.      Breath sounds: Normal breath sounds. No wheezing or rales.   Musculoskeletal:         General: Normal range of motion.      Cervical back: Neck supple.   Lymphadenopathy:      Cervical: No cervical adenopathy.   Skin:     General: Skin is warm and dry.   Neurological:      General: No focal deficit present.      Mental Status: She is alert and oriented to person, place, and time.   Psychiatric:         Mood and Affect: Mood normal.         Behavior: Behavior normal. Behavior is cooperative.    "      Thought Content: Thought content normal.         Judgment: Judgment normal.          Result Review :   The following data was reviewed by: AGNES Reilly on 03/29/2022:  CMP    CMP 8/26/21 10/11/21 2/22/22   Glucose 119 (A) 85 95   BUN 13 12 14   Creatinine 1.01 (A) 0.89 0.89   eGFR Non  Am 54 (A) 63 62   eGFR  Am 62 72 72   Sodium 134 136 134   Potassium 3.4 (A) 4.7 5.1   Chloride 94 (A) 98 99   Calcium 10.3 10.3 10.4 (A)   Total Protein 6.8  6.6   Albumin 4.6  4.6   Globulin 2.2  2.0   Total Bilirubin 0.3  0.3   Alkaline Phosphatase 87  69   AST (SGOT) 18  17   ALT (SGPT) 13  13   (A) Abnormal value       Comments are available for some flowsheets but are not being displayed.           CBC    CBC 8/26/21 2/22/22   WBC 9.7 7.6   RBC 4.42 4.28   Hemoglobin 12.4 12.3   Hematocrit 39.8 37.2   MCV 90 87   MCH 28.1 28.7   MCHC 31.2 (A) 33.1   RDW 13.0 13.5   Platelets 381 341   (A) Abnormal value                 Assessment and Plan    Diagnoses and all orders for this visit:    1. Benign hypertension (Primary)  Assessment & Plan:  Hypertension is not improving.  Discontinue beta-blocker secondary to fatigue.  Start calcium channel-blocker.  Follow-up in 1 month.      2. Mixed hyperlipidemia  Assessment & Plan:  Discontinue simvastatin due to possible interaction with amlodipine.  Start rosuvastatin 40mg daily.  Repeat lipids and CMP in 3 months.       Other orders  -     amLODIPine (NORVASC) 5 MG tablet; Take 1 tablet by mouth Daily.  Dispense: 30 tablet; Refill: 1  -     rosuvastatin (Crestor) 40 MG tablet; Take 1 tablet by mouth Daily.  Dispense: 90 tablet; Refill: 1          Follow Up   Return in about 30 days (around 4/28/2022) for Follow-Up hypertension & hyperlipidemia.    Patient was given instructions and counseling regarding her condition and health maintenance advice. Please see specific information in the After Visit Summary.     AGNES Reilly 3/29/2022 15:23 EDT  This note was  electronically signed.

## 2022-03-30 ENCOUNTER — OFFICE (OUTPATIENT)
Dept: RURAL CLINIC 3 | Facility: CLINIC | Age: 77
End: 2022-03-30

## 2022-03-30 VITALS
WEIGHT: 136 LBS | HEIGHT: 62 IN | DIASTOLIC BLOOD PRESSURE: 65 MMHG | SYSTOLIC BLOOD PRESSURE: 125 MMHG | HEART RATE: 85 BPM

## 2022-03-30 DIAGNOSIS — K21.9 GASTRO-ESOPHAGEAL REFLUX DISEASE WITHOUT ESOPHAGITIS: ICD-10-CM

## 2022-03-30 PROCEDURE — 99214 OFFICE O/P EST MOD 30 MIN: CPT | Performed by: NURSE PRACTITIONER

## 2022-03-30 RX ORDER — PANTOPRAZOLE 40 MG/1
TABLET, DELAYED RELEASE ORAL
Qty: 90 | Refills: 0 | Status: ACTIVE

## 2022-04-27 NOTE — PROGRESS NOTES
Chief Complaint  Hypertension    Subjective          History of Present Illness  Hypertension  Patient presents for follow-up. At her February visit her blood pressure was elevated and her Metoprolol dose was increased. At her March appointment she reported that her BP at home had been in the 120s-130s/50s-60s and HR in 50s-60s.  She felt that her blood pressure increased when she was at medical appointments.  Overall she was feeling well aside from an increase in fatigue since increasing the metoprolol. We decided to discontinue her beta blocker and start Amlodipine instead. Today she reports that she still has been feeling fatigued. She has had some mild constipation issues. She has occasional lower extremity edema, but it has been a little worse since starting the amlodipine.     Chest Pain    She had reported chest pain to me last year and a stress test was ordered. For some reason this was never completed. She reports that she still will have occasional tightness in her chest when she is feeling stressed. She also has had some increased shortness of breath, but she thinks this could be due to her asthma. She is seeing her Allergist tomorrow and will be seeing her Pulmonologist next week.  The pain is present in the substernal region. The pain is mild-to-moderate. The quality of the pain is described as pressure and tightness. The pain does not radiate. Associated symptoms include malaise/fatigue. Pertinent negatives include no abdominal pain, back pain, cough, diaphoresis, dizziness, fever, nausea, numbness, palpitations, shortness of breath, vomiting or weakness. Her past medical history is significant for diabetes, hyperlipidemia and hypertension. Her family medical history is significant for heart disease.         Current Outpatient Medications:   •  acetaminophen (TYLENOL) 325 MG tablet, Take  by mouth., Disp: , Rfl:   •  amLODIPine (NORVASC) 5 MG tablet, Take 1 tablet by mouth Daily., Disp: 90 tablet, Rfl:  1  •  Calcium Carbonate-Vitamin D3 600-400 MG-UNIT tablet, CALCIUM 600-D 600-400 MG-UNIT TABS, Disp: , Rfl:   •  cetirizine (zyrTEC) 10 MG tablet, Take 10 mg by mouth Daily., Disp: , Rfl:   •  Cholecalciferol (EQL Vitamin D3) 25 MCG (1000 UT) capsule, Take 1 capsule by mouth Daily., Disp: 90 capsule, Rfl: 3  •  guaiFENesin (MUCINEX) 600 MG 12 hr tablet, Take 1,200 mg by mouth 2 (Two) Times a Day., Disp: , Rfl:   •  hydroCHLOROthiazide (HYDRODIURIL) 25 MG tablet, Take 1 tablet by mouth Daily., Disp: 90 tablet, Rfl: 1  •  levalbuterol (XOPENEX HFA) 45 MCG/ACT inhaler, XOPENEX HFA 45 MCG/ACT AERO, Disp: , Rfl:   •  losartan (COZAAR) 100 MG tablet, TAKE ONE TABLET BY MOUTH ONCE DAILY FOR BLOOD PRESSURE, Disp: 90 tablet, Rfl: 1  •  mometasone-formoterol (DULERA 100) 100-5 MCG/ACT inhaler, DULERA 100-5 MCG/ACT AERO, Disp: , Rfl:   •  montelukast (SINGULAIR) 10 MG tablet, Take 1 tablet by mouth Daily., Disp: 90 tablet, Rfl: 1  •  Multiple Vitamins-Minerals (CENTRUM SILVER) tablet, CENTRUM SILVER TABS, Disp: , Rfl:   •  pantoprazole (PROTONIX) 40 MG EC tablet, Take 40 mg by mouth Daily., Disp: , Rfl:   •  risedronate (ACTONEL) 150 MG tablet, Take 1 tablet by mouth Every 30 (Thirty) Days. with water on empty stomach, nothing by mouth or lie down for next 30 minutes., Disp: 3 tablet, Rfl: 1  •  rosuvastatin (Crestor) 40 MG tablet, Take 1 tablet by mouth Daily., Disp: 90 tablet, Rfl: 1     Review of Systems   Constitutional: Positive for fatigue. Negative for activity change.   Respiratory: Positive for shortness of breath. Negative for cough and chest tightness.    Cardiovascular: Positive for chest pain, palpitations (sometimes feels like heart is going fast, sometimes slow, never irregular) and leg swelling.   Gastrointestinal: Positive for constipation. Negative for abdominal distention, abdominal pain, diarrhea, nausea and vomiting.   Allergic/Immunologic: Positive for environmental allergies.   Neurological: Negative for  "dizziness, syncope and light-headedness.        Objective   Vital Signs:   Vitals:    04/28/22 1015 04/28/22 1029   BP: 162/83 152/62   BP Location: Right arm    Patient Position: Sitting    Cuff Size: Adult    Pulse: 91    Resp: 16    Temp: 97.8 °F (36.6 °C)    TempSrc: Infrared    SpO2: 97%    Weight: 61.7 kg (136 lb)    Height: 157.5 cm (62\")      Body mass index is 24.87 kg/m².      Physical Exam  Constitutional:       Appearance: She is well-developed.   Neck:      Thyroid: No thyromegaly.      Vascular: No carotid bruit.      Trachea: Trachea normal.   Cardiovascular:      Rate and Rhythm: Normal rate and regular rhythm.      Heart sounds: Normal heart sounds. No murmur heard.    No friction rub. No gallop.   Pulmonary:      Effort: Pulmonary effort is normal.      Breath sounds: Normal breath sounds. No wheezing or rales.   Musculoskeletal:         General: Normal range of motion.      Cervical back: Normal range of motion and neck supple.   Lymphadenopathy:      Cervical: No cervical adenopathy.   Skin:     General: Skin is warm and dry.   Neurological:      Mental Status: She is alert and oriented to person, place, and time.   Psychiatric:         Behavior: Behavior normal.         Thought Content: Thought content normal.         Judgment: Judgment normal.          Result Review :   The following data was reviewed by: AGNES Reilly on 04/28/2022:  Common labs    Common Labsle 8/26/21 8/26/21 8/26/21 10/11/21 2/22/22 2/22/22 2/22/22 2/22/22    1043 1043 1043  1036 1036 1036 1036   Glucose  119 (A)  85  95     BUN  13  12  14     Creatinine  1.01 (A)  0.89  0.89     eGFR Non  Am  54 (A)  63  62     eGFR  Am  62  72  72     Sodium  134  136  134     Potassium  3.4 (A)  4.7  5.1     Chloride  94 (A)  98  99     Calcium  10.3  10.3  10.4 (A)     Total Protein  6.8    6.6     Albumin  4.6    4.6     Total Bilirubin  0.3    0.3     Alkaline Phosphatase  87    69     AST (SGOT)  18    17     ALT " (SGPT)  13    13     WBC 9.7    7.6      Hemoglobin 12.4    12.3      Hematocrit 39.8    37.2      Platelets 381    341      Total Cholesterol        143   Triglycerides        177 (A)   HDL Cholesterol        51   LDL Cholesterol         63   Hemoglobin A1C   6.0 (A)    5.8 (A)    (A) Abnormal value       Comments are available for some flowsheets but are not being displayed.         Data reviewed: Cardiology studies 2/20/20 EKG and Consultant notes 3/30/22 GI Note            Assessment and Plan    Diagnoses and all orders for this visit:    1. Chest pain, unspecified type (Primary)  Assessment & Plan:  Will get stress test rescheduled.   Follow-up in one month.   Discussed warning signs for seeking emergency care.     Orders:  -     Stress test with myocardial perfusion; Future    2. Palpitations  Assessment & Plan:  She will start recording heart rate when she is checking her BP.   Holter to further evaluate.   Follow-up in 1 month.     Orders:  -     Holter monitor - 24 hour; Future    3. Benign hypertension  Assessment & Plan:  Still elevated today, but she reports normal readings at home.   Discussed increasing amlodipine dose, but she declines.   Will hold on any changes while cardiac evaluation is being conducted.   She will monitor BP closely and follow-up in 1 month. She will bring BP log to that visit.       4. Mild persistent asthma without complication  Assessment & Plan:  Continue follow-up with Dr. Hodge and Dr. Benito.         Other orders  -     amLODIPine (NORVASC) 5 MG tablet; Take 1 tablet by mouth Daily.  Dispense: 90 tablet; Refill: 1  -     hydroCHLOROthiazide (HYDRODIURIL) 25 MG tablet; Take 1 tablet by mouth Daily.  Dispense: 90 tablet; Refill: 1  -     montelukast (SINGULAIR) 10 MG tablet; Take 1 tablet by mouth Daily.  Dispense: 90 tablet; Refill: 1  -     risedronate (ACTONEL) 150 MG tablet; Take 1 tablet by mouth Every 30 (Thirty) Days. with water on empty stomach, nothing by mouth or  lie down for next 30 minutes.  Dispense: 3 tablet; Refill: 1          Follow Up   Return in about 1 month (around 5/28/2022) for Follow-Up hypertension, chest pain, palpitations with Rocio.    Patient was given instructions and counseling regarding her condition and health maintenance advice. Please see specific information in the After Visit Summary.     Akanksha Mauricio, AGNES 4/28/2022 10:52 EDT  This note was electronically signed.

## 2022-04-28 ENCOUNTER — OFFICE VISIT (OUTPATIENT)
Dept: FAMILY MEDICINE CLINIC | Facility: CLINIC | Age: 77
End: 2022-04-28

## 2022-04-28 VITALS
HEART RATE: 91 BPM | BODY MASS INDEX: 25.03 KG/M2 | DIASTOLIC BLOOD PRESSURE: 62 MMHG | RESPIRATION RATE: 16 BRPM | HEIGHT: 62 IN | SYSTOLIC BLOOD PRESSURE: 152 MMHG | TEMPERATURE: 97.8 F | OXYGEN SATURATION: 97 % | WEIGHT: 136 LBS

## 2022-04-28 DIAGNOSIS — J45.30 MILD PERSISTENT ASTHMA WITHOUT COMPLICATION: ICD-10-CM

## 2022-04-28 DIAGNOSIS — R07.9 CHEST PAIN, UNSPECIFIED TYPE: Primary | ICD-10-CM

## 2022-04-28 DIAGNOSIS — R00.2 PALPITATIONS: ICD-10-CM

## 2022-04-28 DIAGNOSIS — I10 BENIGN HYPERTENSION: ICD-10-CM

## 2022-04-28 PROCEDURE — 99214 OFFICE O/P EST MOD 30 MIN: CPT | Performed by: NURSE PRACTITIONER

## 2022-04-28 RX ORDER — RISEDRONATE SODIUM 150 MG/1
150 TABLET, FILM COATED ORAL
Qty: 3 TABLET | Refills: 1 | Status: SHIPPED | OUTPATIENT
Start: 2022-04-28 | End: 2022-05-31 | Stop reason: SDUPTHER

## 2022-04-28 RX ORDER — MONTELUKAST SODIUM 10 MG/1
10 TABLET ORAL DAILY
Qty: 90 TABLET | Refills: 1 | Status: SHIPPED | OUTPATIENT
Start: 2022-04-28 | End: 2022-05-31 | Stop reason: SDUPTHER

## 2022-04-28 RX ORDER — AMLODIPINE BESYLATE 5 MG/1
5 TABLET ORAL DAILY
Qty: 90 TABLET | Refills: 1 | Status: SHIPPED | OUTPATIENT
Start: 2022-04-28 | End: 2022-05-31 | Stop reason: SDUPTHER

## 2022-04-28 RX ORDER — HYDROCHLOROTHIAZIDE 25 MG/1
25 TABLET ORAL DAILY
Qty: 90 TABLET | Refills: 1 | Status: SHIPPED | OUTPATIENT
Start: 2022-04-28 | End: 2022-05-31 | Stop reason: SDUPTHER

## 2022-04-28 NOTE — ASSESSMENT & PLAN NOTE
She will start recording heart rate when she is checking her BP.   Holter to further evaluate.   Follow-up in 1 month.

## 2022-04-28 NOTE — ASSESSMENT & PLAN NOTE
Still elevated today, but she reports normal readings at home.   Discussed increasing amlodipine dose, but she declines.   Will hold on any changes while cardiac evaluation is being conducted.   She will monitor BP closely and follow-up in 1 month. She will bring BP log to that visit.

## 2022-04-28 NOTE — ASSESSMENT & PLAN NOTE
Will get stress test rescheduled.   Follow-up in one month.   Discussed warning signs for seeking emergency care.

## 2022-05-05 ENCOUNTER — OFFICE VISIT (OUTPATIENT)
Dept: PULMONOLOGY | Facility: HOSPITAL | Age: 77
End: 2022-05-05

## 2022-05-05 ENCOUNTER — HOSPITAL ENCOUNTER (OUTPATIENT)
Dept: RESPIRATORY THERAPY | Facility: HOSPITAL | Age: 77
Discharge: HOME | End: 2022-05-05

## 2022-05-05 VITALS
WEIGHT: 136 LBS | BODY MASS INDEX: 25.03 KG/M2 | RESPIRATION RATE: 16 BRPM | HEART RATE: 98 BPM | TEMPERATURE: 98.2 F | HEIGHT: 62 IN | SYSTOLIC BLOOD PRESSURE: 153 MMHG | DIASTOLIC BLOOD PRESSURE: 84 MMHG | OXYGEN SATURATION: 98 %

## 2022-05-05 DIAGNOSIS — F51.01 PRIMARY INSOMNIA: ICD-10-CM

## 2022-05-05 DIAGNOSIS — J41.1 CHRONIC BRONCHITIS WITH PRODUCTIVE MUCOPURULENT COUGH: ICD-10-CM

## 2022-05-05 DIAGNOSIS — R00.2 PALPITATIONS: ICD-10-CM

## 2022-05-05 DIAGNOSIS — J45.30 MILD PERSISTENT ASTHMA WITHOUT COMPLICATION: Primary | ICD-10-CM

## 2022-05-05 DIAGNOSIS — J30.9 ALLERGIC RHINITIS, UNSPECIFIED SEASONALITY, UNSPECIFIED TRIGGER: ICD-10-CM

## 2022-05-05 PROCEDURE — 93225 XTRNL ECG REC<48 HRS REC: CPT

## 2022-05-05 PROCEDURE — G0463 HOSPITAL OUTPT CLINIC VISIT: HCPCS

## 2022-05-05 RX ORDER — BUDESONIDE AND FORMOTEROL FUMARATE DIHYDRATE 160; 4.5 UG/1; UG/1
2 AEROSOL RESPIRATORY (INHALATION) 2 TIMES DAILY
Qty: 1 EACH | Refills: 11 | Status: SHIPPED | OUTPATIENT
Start: 2022-05-05

## 2022-05-05 RX ORDER — LEVOFLOXACIN 500 MG/1
TABLET, FILM COATED ORAL
COMMUNITY
Start: 2022-04-29 | End: 2022-05-25

## 2022-05-05 NOTE — PROGRESS NOTES
SLEEP/PULMONARY  CLINIC NOTE      PATIENT IDENTIFICATION:  Name: Cara Castañeda  Age: 76 y.o.  Sex: female  :  1945  MRN: [id number]    DATE OF CONSULTATION:  2022                     CHIEF COMPLAINT: Follow-up on asthma and developing cough    History of Present Illness:   Cara Castañeda is a 76 y.o. female patient is due for asthma she was on Hinderliter since February she has persistent cough day and night she was treated as outpatient with multiple courses of antibiotics and steroid but continued to have significant cough she was evaluated also with images was negative no fever no chills no hemoptysis some nasal congestion of      Review of Systems:   Constitutional:  As above   Eyes: negative   ENT/oropharynx: negative   Cardiovascular: negative   Respiratory:  As above   Gastrointestinal: negative   Genitourinary: negative   Neurological: negative   Musculoskeletal: negative   Integument/breast: negative   Endocrine: negative   Allergic/Immunologic: negative     Past Medical History:  Past Medical History:   Diagnosis Date   • Allergic rhinitis     on immunotherapy- Dr. Benito    • Asthma     Dr. Benito   • Bruit of left carotid artery 2020   • Carotid stenosis     <50%   • Cold sore    • Gallstone pancreatitis    • GERD (gastroesophageal reflux disease)     Dr. Oliva   • Hiatal hernia     small, sliding    • Hyperlipidemia    • Hypertension    • Left ventricular hypertrophy    • Osteoarthritis     Dr. Avila   • Osteopenia with high risk of fracture    • Pre-diabetes    • Vocal cord nodule     Dr. Nieves       Past Surgical History:  Past Surgical History:   Procedure Laterality Date   • APPENDECTOMY     • CHOLECYSTECTOMY  2006    Dr. Hernandez   • ENDOSCOPY  2017    & Dilation. Dr. Oliva   • ERCP  2006    Dr. Oliva   • HYSTERECTOMY  1974    ovaries remain, for benign reasons   • OTHER SURGICAL HISTORY      Stress Echo. No ischemia. EF 60-65%.    • TONSILLECTOMY  AND ADENOIDECTOMY     • TOTAL KNEE ARTHROPLASTY  08/31/2017        Family History:  Family History   Problem Relation Age of Onset   • Hypertension Mother    • Hypertension Father    • Heart disease Father    • Prostate cancer Paternal Grandfather         Social History:   Social History     Tobacco Use   • Smoking status: Never Smoker   • Smokeless tobacco: Never Used   Substance Use Topics   • Alcohol use: No        Allergies:  Allergies   Allergen Reactions   • Aspirin GI Bleeding   • Albuterol Sulfate Anxiety   • Atorvastatin Calcium Nausea Only   • Sulfa Antibiotics Nausea Only   • Tolterodine Nausea Only     Detrol       Home Meds:  (Not in a hospital admission)      Objective:    Vitals Ranges:   Temp:  [98.2 °F (36.8 °C)] 98.2 °F (36.8 °C)  Heart Rate:  [98] 98  Resp:  [16] 16  BP: (153)/(84) 153/84  Body mass index is 24.87 kg/m².     Exam:  General Appearance:  WDWN    HEENT:   without obvious abnormality,  Conjunctiva/corneas clear,  Normal external ear canals, no drainage    Clear orsalmucosa,  Mallampati score 3    Neck:  Supple, symmetrical, trachea midline. No JVD.  Lungs:   Bilateral basal crackles and rhonchi bilaterally, respirations unlabored symmetrical wall movement.    Chest wall:  No tenderness or deformity.    Heart:  Regular rate and rhythm, S1 and S2 normal.  Extremities: Trace edema no clubbing or Cyanosis        Data Review:  All labs (24hrs): No results found for this or any previous visit (from the past 24 hour(s)).     Imaging:  Mammo Screening Digital Tomosynthesis Bilateral With CAD  Narrative: MAMMO SCREENING DIGITAL TOMOSYNTHESIS BILATERAL W CAD-     Date of Exam: 3/18/2022 9:52 AM     Indication: Screening.     Comparison: 03/15/2021, 03/05/2020, 02/14/2019, 02/12/2018.     Technique: MLO and CC views of bilateral breast(s) were obtained  according to routine screening breast mammography protocol with  tomosynthesis.       The mammographic images were interpreted with the  assistance of a  computer aided detection system.      FINDINGS:  There are scattered areas of fibroglandular density.      There are no suspicious masses, suspicious microcalcifications, or  architectural distortion in either breast.      Impression: No mammographic signs of malignancy. Recommend routine mammographic  screening.     BI-RADS ASSESSMENT: BI-RADS 1. Negative.     The patient's information is entered into a computerized reminder system  with a targeted due date for the next mammogram.     Note:  It has been reported that there is approximately a 15% false  negative in mammography.  Therefore, management of a palpable  abnormality should not be deferred because of a negative mammogram.           Electronically Signed By-Gloria Boyer MD On:3/18/2022 11:47 AM  This report was finalized on [id number] by  Gloria Boyer MD.       ASSESSMENT:  Diagnoses and all orders for this visit:    Mild persistent asthma without complication  -     CT Chest Without Contrast Diagnostic; Future    Primary insomnia    Allergic rhinitis, unspecified seasonality, unspecified trigger    Chronic bronchitis with productive mucopurulent cough (HCC)  -     CT Chest Without Contrast Diagnostic; Future    Other orders  -     levoFLOXacin (LEVAQUIN) 500 MG tablet  -     budesonide-formoterol (SYMBICORT) 160-4.5 MCG/ACT inhaler; Inhale 2 puffs 2 (Two) Times a Day.        PLAN:  Get a CT chest for further evaluation for the persistent cough with productive cough and chest exam showing significant crackles in the lower lobe  Changed to Symbicort bronchodilator inhaled corticosteroid    Education how to use inhalers    Encouraged to use incentive spirometer    Continue to exercise slowly as tolerated    Monitor for any change in the color of the sputum    Avoid any exposure to fumes, gas or any irritant        Follow-up 3 weeks    Natty Hodge MD. D, ABSM.  5/5/2022  13:56 EDT

## 2022-05-06 ENCOUNTER — HOSPITAL ENCOUNTER (OUTPATIENT)
Dept: CT IMAGING | Facility: HOSPITAL | Age: 77
Discharge: HOME | End: 2022-05-06
Admitting: INTERNAL MEDICINE

## 2022-05-06 DIAGNOSIS — J41.1 CHRONIC BRONCHITIS WITH PRODUCTIVE MUCOPURULENT COUGH: ICD-10-CM

## 2022-05-06 DIAGNOSIS — J45.30 MILD PERSISTENT ASTHMA WITHOUT COMPLICATION: ICD-10-CM

## 2022-05-06 PROCEDURE — 71250 CT THORAX DX C-: CPT

## 2022-05-10 PROCEDURE — 93227 XTRNL ECG REC<48 HR R&I: CPT | Performed by: INTERNAL MEDICINE

## 2022-05-18 ENCOUNTER — ON CAMPUS - OUTPATIENT (OUTPATIENT)
Dept: RURAL HOSPITAL 3 | Facility: HOSPITAL | Age: 77
End: 2022-05-18

## 2022-05-18 DIAGNOSIS — K21.9 GASTRO-ESOPHAGEAL REFLUX DISEASE WITHOUT ESOPHAGITIS: ICD-10-CM

## 2022-05-18 DIAGNOSIS — R13.10 DYSPHAGIA, UNSPECIFIED: ICD-10-CM

## 2022-05-18 DIAGNOSIS — K22.2 ESOPHAGEAL OBSTRUCTION: ICD-10-CM

## 2022-05-18 PROCEDURE — 43235 EGD DIAGNOSTIC BRUSH WASH: CPT | Performed by: INTERNAL MEDICINE

## 2022-05-18 PROCEDURE — 43450 DILATE ESOPHAGUS 1/MULT PASS: CPT | Performed by: INTERNAL MEDICINE

## 2022-05-20 ENCOUNTER — APPOINTMENT (OUTPATIENT)
Dept: NUCLEAR MEDICINE | Facility: HOSPITAL | Age: 77
End: 2022-05-20

## 2022-05-25 ENCOUNTER — OFFICE VISIT (OUTPATIENT)
Dept: PULMONOLOGY | Facility: HOSPITAL | Age: 77
End: 2022-05-25

## 2022-05-25 VITALS
SYSTOLIC BLOOD PRESSURE: 146 MMHG | DIASTOLIC BLOOD PRESSURE: 81 MMHG | BODY MASS INDEX: 25.03 KG/M2 | OXYGEN SATURATION: 99 % | HEIGHT: 62 IN | WEIGHT: 136 LBS | HEART RATE: 79 BPM | RESPIRATION RATE: 14 BRPM

## 2022-05-25 DIAGNOSIS — J45.30 MILD PERSISTENT ASTHMA WITHOUT COMPLICATION: ICD-10-CM

## 2022-05-25 DIAGNOSIS — F51.01 PRIMARY INSOMNIA: ICD-10-CM

## 2022-05-25 DIAGNOSIS — J47.9 BRONCHIECTASIS WITHOUT COMPLICATION: Primary | ICD-10-CM

## 2022-05-25 PROCEDURE — G0463 HOSPITAL OUTPT CLINIC VISIT: HCPCS

## 2022-05-25 RX ORDER — LEVOFLOXACIN 500 MG/1
500 TABLET, FILM COATED ORAL DAILY
Qty: 15 TABLET | Refills: 0 | Status: SHIPPED | OUTPATIENT
Start: 2022-05-25 | End: 2022-08-15

## 2022-05-25 NOTE — PROGRESS NOTES
SLEEP/PULMONARY  CLINIC NOTE      PATIENT IDENTIFICATION:  Name: Cara Castañeda  Age: 76 y.o.  Sex: female  :  1945  MRN: [id number]    DATE OF CONSULTATION:  2022                     CHIEF COMPLAINT: Following on persistent cough    History of Present Illness:   Cara Castañeda is a 76 y.o. female patient feeling significantly better less shortness of breath less coughing no sputum's anymore, still sometimes waking up in the morning cough, no chest pain or potation no dizziness no lightheadedness  Patient still having symptoms of insomnia on and off like 2-3 times week up to 2 hours, with multiple waking up at night      Review of Systems:   Constitutional: negative   Eyes: negative   ENT/oropharynx: negative   Cardiovascular: negative   Respiratory: negative   Gastrointestinal: negative   Genitourinary: negative   Neurological: negative   Musculoskeletal: negative   Integument/breast: negative   Endocrine: negative   Allergic/Immunologic: negative     Past Medical History:  Past Medical History:   Diagnosis Date   • Allergic rhinitis     on immunotherapy- Dr. Benito    • Asthma     Dr. Benito   • Bruit of left carotid artery 2020   • Carotid stenosis     <50%   • Cold sore    • Gallstone pancreatitis    • GERD (gastroesophageal reflux disease)     Dr. Oliva   • Hiatal hernia     small, sliding    • Hyperlipidemia    • Hypertension    • Left ventricular hypertrophy    • Osteoarthritis     Dr. Avila   • Osteopenia with high risk of fracture    • Pre-diabetes    • Vocal cord nodule     Dr. Nieves       Past Surgical History:  Past Surgical History:   Procedure Laterality Date   • APPENDECTOMY     • CHOLECYSTECTOMY  2006    Dr. Hernandez   • ENDOSCOPY  2017    & Dilation. Dr. Oliva   • ERCP  2006    Dr. Oliva   • HYSTERECTOMY  1974    ovaries remain, for benign reasons   • OTHER SURGICAL HISTORY      Stress Echo. No ischemia. EF 60-65%.    • TONSILLECTOMY AND ADENOIDECTOMY      • TOTAL KNEE ARTHROPLASTY  08/31/2017        Family History:  Family History   Problem Relation Age of Onset   • Hypertension Mother    • Hypertension Father    • Heart disease Father    • Prostate cancer Paternal Grandfather         Social History:   Social History     Tobacco Use   • Smoking status: Never Smoker   • Smokeless tobacco: Never Used   Substance Use Topics   • Alcohol use: No        Allergies:  Allergies   Allergen Reactions   • Aspirin GI Bleeding   • Albuterol Sulfate Anxiety   • Atorvastatin Calcium Nausea Only   • Sulfa Antibiotics Nausea Only   • Tolterodine Nausea Only     Detrol       Home Meds:  (Not in a hospital admission)      Objective:    Vitals Ranges:   Heart Rate:  [79] 79  Resp:  [14] 14  BP: (146)/(81) 146/81  Body mass index is 24.87 kg/m².     Exam:  General Appearance:  WDWN    HEENT:   without obvious abnormality,  Conjunctiva/corneas clear,  Normal external ear canals, no drainage    Clear orsalmucosa,  Mallampati score 3    Neck:  Supple, symmetrical, trachea midline. No JVD.  Lungs:   Bilateral basal rhonchi bilaterally, respirations unlabored symmetrical wall movement.    Chest wall:  No tenderness or deformity.    Heart:  Regular rate and rhythm, S1 and S2 normal.  Extremities: Trace edema no clubbing or Cyanosis        Data Review:  All labs (24hrs): No results found for this or any previous visit (from the past 24 hour(s)).     Imaging:  Holter monitor - 24 hour  2-day Holter monitor.    Indications  Palpitations    Basic rhythm is sinus with rates varying between 57--117/min.  No significant premature atrial or ventricular contractions were noted.  No pauses were noted.  No evidence for AV blocks was seen.    Impression  Normal Holter monitor.       ASSESSMENT:  Diagnoses and all orders for this visit:    Bronchiectasis without complication (HCC)    Mild persistent asthma without complication    Primary insomnia    Other orders  -     levoFLOXacin (Levaquin) 500 MG  tablet; Take 1 tablet by mouth Daily.        PLAN:  Repeat 15 days of Levaquin p.o.  Bronchodilator inhaled corticosteroid    Education how to use inhalers    Encouraged to use incentive spirometer    Continue to exercise slowly as tolerated    Monitor for any change in the color of the sputum    Avoid any exposure to fumes, gas or any irritant    •Set a bedtime that is early enough for you to get at least 7 hours of sleep.  •Don’t go to bed unless you are sleepy.  •If you don’t fall asleep after 20 minutes, get out of bed.  •Establish a relaxing bedtime routine.  •Use your bed only for sleep and sex.  •Make your bedroom quiet and relaxing. Keep the room at a comfortable, cool temperature.  •Limit exposure to bright light in the evenings.  •Turn off electronic devices at least 30 minutes before bedtime.  •Don’t eat a large meal before bedtime. If you are hungry at night, eat a light, healthy snack.  •Exercise regularly and maintain a healthy diet.  •Avoid consuming caffeine in the late afternoon or evening.  •Avoid consuming alcohol before bedtime.  •Reduce your fluid intake before bedtime.  •Avoid naps during the day time.       Follow-up 3 months    Natty Hodge MD. D, ABSM.  5/25/2022  13:42 EDT

## 2022-05-27 ENCOUNTER — HOSPITAL ENCOUNTER (OUTPATIENT)
Dept: NUCLEAR MEDICINE | Facility: HOSPITAL | Age: 77
Discharge: HOME | End: 2022-05-27

## 2022-05-27 DIAGNOSIS — R07.9 CHEST PAIN, UNSPECIFIED TYPE: ICD-10-CM

## 2022-05-27 PROCEDURE — 0 TECHNETIUM TETROFOSMIN KIT: Performed by: INTERNAL MEDICINE

## 2022-05-27 PROCEDURE — 93017 CV STRESS TEST TRACING ONLY: CPT

## 2022-05-27 PROCEDURE — 78452 HT MUSCLE IMAGE SPECT MULT: CPT | Performed by: INTERNAL MEDICINE

## 2022-05-27 PROCEDURE — A9502 TC99M TETROFOSMIN: HCPCS | Performed by: INTERNAL MEDICINE

## 2022-05-27 PROCEDURE — 78452 HT MUSCLE IMAGE SPECT MULT: CPT

## 2022-05-27 PROCEDURE — 93016 CV STRESS TEST SUPVJ ONLY: CPT | Performed by: INTERNAL MEDICINE

## 2022-05-27 PROCEDURE — 93018 CV STRESS TEST I&R ONLY: CPT | Performed by: INTERNAL MEDICINE

## 2022-05-27 PROCEDURE — 25010000002 REGADENOSON 0.4 MG/5ML SOLUTION: Performed by: INTERNAL MEDICINE

## 2022-05-27 RX ADMIN — TETROFOSMIN 1 DOSE: 1.38 INJECTION, POWDER, LYOPHILIZED, FOR SOLUTION INTRAVENOUS at 10:48

## 2022-05-27 RX ADMIN — REGADENOSON 0.4 MG: 0.08 INJECTION, SOLUTION INTRAVENOUS at 11:25

## 2022-05-27 RX ADMIN — TETROFOSMIN 1 DOSE: 1.38 INJECTION, POWDER, LYOPHILIZED, FOR SOLUTION INTRAVENOUS at 11:25

## 2022-05-30 LAB
BH CV REST NUCLEAR ISOTOPE DOSE: 10.7 MCI
BH CV STRESS BP STAGE 1: NORMAL
BH CV STRESS BP STAGE 2: NORMAL
BH CV STRESS COMMENTS STAGE 1: NORMAL
BH CV STRESS COMMENTS STAGE 2: NORMAL
BH CV STRESS DOSE REGADENOSON STAGE 1: 0.4
BH CV STRESS DURATION MIN STAGE 1: 0
BH CV STRESS DURATION MIN STAGE 2: 4
BH CV STRESS DURATION SEC STAGE 1: 10
BH CV STRESS DURATION SEC STAGE 2: 0
BH CV STRESS HR STAGE 1: 78
BH CV STRESS HR STAGE 2: 100
BH CV STRESS NUCLEAR ISOTOPE DOSE: 33 MCI
BH CV STRESS PROTOCOL 1: NORMAL
BH CV STRESS RECOVERY BP: NORMAL MMHG
BH CV STRESS RECOVERY HR: 99 BPM
BH CV STRESS STAGE 1: 1
BH CV STRESS STAGE 2: 2
LV EF NUC BP: 88 %
MAXIMAL PREDICTED HEART RATE: 142 BPM
PERCENT MAX PREDICTED HR: 70.42 %
STRESS BASELINE BP: NORMAL MMHG
STRESS BASELINE HR: 78 BPM
STRESS PERCENT HR: 83 %
STRESS POST PEAK BP: NORMAL MMHG
STRESS POST PEAK HR: 100 BPM
STRESS TARGET HR: 121 BPM

## 2022-05-31 ENCOUNTER — OFFICE VISIT (OUTPATIENT)
Dept: FAMILY MEDICINE CLINIC | Facility: CLINIC | Age: 77
End: 2022-05-31

## 2022-05-31 VITALS
WEIGHT: 133 LBS | DIASTOLIC BLOOD PRESSURE: 78 MMHG | HEIGHT: 62 IN | BODY MASS INDEX: 24.48 KG/M2 | RESPIRATION RATE: 16 BRPM | OXYGEN SATURATION: 98 % | SYSTOLIC BLOOD PRESSURE: 152 MMHG | TEMPERATURE: 98.1 F | HEART RATE: 91 BPM

## 2022-05-31 DIAGNOSIS — R00.2 PALPITATIONS: ICD-10-CM

## 2022-05-31 DIAGNOSIS — R73.03 PREDIABETES: ICD-10-CM

## 2022-05-31 DIAGNOSIS — R53.83 FATIGUE, UNSPECIFIED TYPE: ICD-10-CM

## 2022-05-31 DIAGNOSIS — I10 BENIGN HYPERTENSION: Primary | ICD-10-CM

## 2022-05-31 DIAGNOSIS — E53.8 VITAMIN B12 DEFICIENCY: ICD-10-CM

## 2022-05-31 DIAGNOSIS — E78.2 MIXED HYPERLIPIDEMIA: ICD-10-CM

## 2022-05-31 DIAGNOSIS — E83.52 HYPERCALCEMIA: ICD-10-CM

## 2022-05-31 DIAGNOSIS — J45.30 MILD PERSISTENT ASTHMA WITHOUT COMPLICATION: ICD-10-CM

## 2022-05-31 PROCEDURE — 99214 OFFICE O/P EST MOD 30 MIN: CPT | Performed by: NURSE PRACTITIONER

## 2022-05-31 RX ORDER — LOSARTAN POTASSIUM 100 MG/1
100 TABLET ORAL DAILY
Qty: 90 TABLET | Refills: 1 | Status: SHIPPED | OUTPATIENT
Start: 2022-05-31 | End: 2022-11-22

## 2022-05-31 RX ORDER — HYDROCHLOROTHIAZIDE 25 MG/1
25 TABLET ORAL DAILY
Qty: 90 TABLET | Refills: 1 | Status: SHIPPED | OUTPATIENT
Start: 2022-05-31 | End: 2022-12-19

## 2022-05-31 RX ORDER — MONTELUKAST SODIUM 10 MG/1
10 TABLET ORAL DAILY
Qty: 90 TABLET | Refills: 1 | Status: SHIPPED | OUTPATIENT
Start: 2022-05-31

## 2022-05-31 RX ORDER — RISEDRONATE SODIUM 150 MG/1
150 TABLET, FILM COATED ORAL
Qty: 3 TABLET | Refills: 1 | Status: SHIPPED | OUTPATIENT
Start: 2022-05-31

## 2022-05-31 RX ORDER — CHLORPHENIRAMINE/PHENYLPROPAN 8 MG-75 MG
1000 CAPSULE, EXTENDED RELEASE ORAL DAILY
Qty: 90 CAPSULE | Refills: 3 | Status: SHIPPED | OUTPATIENT
Start: 2022-05-31 | End: 2022-08-15

## 2022-05-31 RX ORDER — ROSUVASTATIN CALCIUM 40 MG/1
40 TABLET, COATED ORAL DAILY
Qty: 90 TABLET | Refills: 1 | Status: SHIPPED | OUTPATIENT
Start: 2022-05-31 | End: 2022-08-15 | Stop reason: HOSPADM

## 2022-05-31 RX ORDER — AMLODIPINE BESYLATE 5 MG/1
5 TABLET ORAL DAILY
Qty: 90 TABLET | Refills: 1 | Status: SHIPPED | OUTPATIENT
Start: 2022-05-31

## 2022-06-01 ENCOUNTER — OFFICE (OUTPATIENT)
Dept: RURAL CLINIC 3 | Facility: CLINIC | Age: 77
End: 2022-06-01

## 2022-06-01 VITALS
SYSTOLIC BLOOD PRESSURE: 120 MMHG | HEART RATE: 82 BPM | HEIGHT: 62 IN | DIASTOLIC BLOOD PRESSURE: 71 MMHG | WEIGHT: 133 LBS

## 2022-06-01 DIAGNOSIS — R13.10 DYSPHAGIA, UNSPECIFIED: ICD-10-CM

## 2022-06-01 DIAGNOSIS — K21.9 GASTRO-ESOPHAGEAL REFLUX DISEASE WITHOUT ESOPHAGITIS: ICD-10-CM

## 2022-06-01 PROCEDURE — 99213 OFFICE O/P EST LOW 20 MIN: CPT | Performed by: NURSE PRACTITIONER

## 2022-06-01 NOTE — PROGRESS NOTES
Chief Complaint  Establish Care    Subjective          Cara Castañeda presents to [facility] for establish care.  History of Present Illness      Patient is here to establish care after provider recently retired.    Patient recently had a stress test and Holter monitor.  She had had some chest pain and palpitations.  The results were normal and she is not having any additional symptoms right now.  Offered cardiology referral if she desired.  She has declined.    Patient has prediabetes is watching her diet and exercise.  Recently had A1c that was acceptable.      Patient has asthma and is using Symbicort.  She is stable on this medication along with Singulair.  She has been taking her Actonel monthly.  She is having no side effects or problems.  Patient is currently being followed up with pulmonary as well.  Due a CT repeat in August    Blood pressure is also stable at this time with no side effects or problems.      Cara Castañeda  has a past medical history of Allergic rhinitis, Asthma, Bruit of left carotid artery (2/20/2020), Carotid stenosis, Cold sore, Gallstone pancreatitis (2006), GERD (gastroesophageal reflux disease), Hiatal hernia, Hyperlipidemia, Hypertension, Left ventricular hypertrophy, Osteoarthritis, Osteopenia with high risk of fracture, Pre-diabetes, and Vocal cord nodule.      Review of Systems   Constitutional: Negative for fatigue and fever.   Respiratory: Negative for cough and shortness of breath.    Cardiovascular: Negative for chest pain.   Gastrointestinal: Negative for abdominal pain.        Objective       Current Outpatient Medications:   •  acetaminophen (TYLENOL) 325 MG tablet, Take  by mouth., Disp: , Rfl:   •  amLODIPine (NORVASC) 5 MG tablet, Take 1 tablet by mouth Daily., Disp: 90 tablet, Rfl: 1  •  budesonide-formoterol (SYMBICORT) 160-4.5 MCG/ACT inhaler, Inhale 2 puffs 2 (Two) Times a Day., Disp: 1 each, Rfl: 11  •  Calcium  "Carbonate-Vitamin D3 600-400 MG-UNIT tablet, CALCIUM 600-D 600-400 MG-UNIT TABS, Disp: , Rfl:   •  cetirizine (zyrTEC) 10 MG tablet, Take 10 mg by mouth Daily., Disp: , Rfl:   •  Cholecalciferol (EQL Vitamin D3) 25 MCG (1000 UT) capsule, Take 1 capsule by mouth Daily., Disp: 90 capsule, Rfl: 3  •  guaiFENesin (MUCINEX) 600 MG 12 hr tablet, Take 1,200 mg by mouth 2 (Two) Times a Day., Disp: , Rfl:   •  hydroCHLOROthiazide (HYDRODIURIL) 25 MG tablet, Take 1 tablet by mouth Daily., Disp: 90 tablet, Rfl: 1  •  levalbuterol (XOPENEX HFA) 45 MCG/ACT inhaler, XOPENEX HFA 45 MCG/ACT AERO, Disp: , Rfl:   •  levoFLOXacin (Levaquin) 500 MG tablet, Take 1 tablet by mouth Daily., Disp: 15 tablet, Rfl: 0  •  losartan (COZAAR) 100 MG tablet, Take 1 tablet by mouth Daily., Disp: 90 tablet, Rfl: 1  •  montelukast (SINGULAIR) 10 MG tablet, Take 1 tablet by mouth Daily., Disp: 90 tablet, Rfl: 1  •  Multiple Vitamins-Minerals (CENTRUM SILVER) tablet, CENTRUM SILVER TABS, Disp: , Rfl:   •  pantoprazole (PROTONIX) 40 MG EC tablet, Take 40 mg by mouth Daily., Disp: , Rfl:   •  risedronate (ACTONEL) 150 MG tablet, Take 1 tablet by mouth Every 30 (Thirty) Days. with water on empty stomach, nothing by mouth or lie down for next 30 minutes., Disp: 3 tablet, Rfl: 1  •  rosuvastatin (Crestor) 40 MG tablet, Take 1 tablet by mouth Daily., Disp: 90 tablet, Rfl: 1    Vital Signs:      /78 (BP Location: Right arm, Patient Position: Sitting, Cuff Size: Adult)   Pulse 91   Temp 98.1 °F (36.7 °C) (Infrared)   Resp 16   Ht 157.5 cm (62\")   Wt 60.3 kg (133 lb)   SpO2 98%   BMI 24.33 kg/m²     Vitals:    05/31/22 0947   BP: 152/78   BP Location: Right arm   Patient Position: Sitting   Cuff Size: Adult   Pulse: 91   Resp: 16   Temp: 98.1 °F (36.7 °C)   TempSrc: Infrared   SpO2: 98%   Weight: 60.3 kg (133 lb)   Height: 157.5 cm (62\")      Physical Exam  Vitals and nursing note reviewed.   Constitutional:       Appearance: She is well-developed. "   Cardiovascular:      Rate and Rhythm: Normal rate and regular rhythm.      Heart sounds: Normal heart sounds. No murmur heard.    No friction rub. No gallop.   Pulmonary:      Effort: Pulmonary effort is normal.      Breath sounds: Normal breath sounds. No wheezing or rales.   Abdominal:      General: Bowel sounds are normal.      Palpations: Abdomen is soft.      Tenderness: There is no abdominal tenderness.   Skin:     General: Skin is warm and dry.   Neurological:      Mental Status: She is alert.   Psychiatric:         Mood and Affect: Mood normal.         Behavior: Behavior normal.         Thought Content: Thought content normal.         Judgment: Judgment normal.        Result Review :                PHQ-9 Depression Screening  Little interest or pleasure in doing things?     Feeling down, depressed, or hopeless?     Trouble falling or staying asleep, or sleeping too much?     Feeling tired or having little energy?     Poor appetite or overeating?     Feeling bad about yourself - or that you are a failure or have let yourself or your family down?     Trouble concentrating on things, such as reading the newspaper or watching television?     Moving or speaking so slowly that other people could have noticed? Or the opposite - being so fidgety or restless that you have been moving around a lot more than usual?     Thoughts that you would be better off dead, or of hurting yourself in some way?     PHQ-9 Total Score     If you checked off any problems, how difficult have these problems made it for you to do your work, take care of things at home, or get along with other people?             Assessment and Plan    Diagnoses and all orders for this visit:    1. Benign hypertension (Primary)  Assessment & Plan:  Hypertension is improving with treatment.  Continue current treatment regimen.  Blood pressure will be reassessed at the next regular appointment.      2. Mild persistent asthma without complication    3.  Prediabetes  Assessment & Plan:  We will recheck at 6-month point.  Reviewed last labs      4. Mixed hyperlipidemia  Assessment & Plan:  Lipid abnormalities are improving with treatment.  Pharmacotherapy as ordered.  Lipids will be reassessed in 3 months.      5. Vitamin B12 deficiency  Assessment & Plan:  We will recheck B12 level at next appointment      6. Fatigue, unspecified type  -     TSH    7. Hypercalcemia  -     Calcium, Ionized    8. Palpitations  Assessment & Plan:  Currently denies symptoms.  Holter monitor was normal.      Other orders  -     rosuvastatin (Crestor) 40 MG tablet; Take 1 tablet by mouth Daily.  Dispense: 90 tablet; Refill: 1  -     risedronate (ACTONEL) 150 MG tablet; Take 1 tablet by mouth Every 30 (Thirty) Days. with water on empty stomach, nothing by mouth or lie down for next 30 minutes.  Dispense: 3 tablet; Refill: 1  -     montelukast (SINGULAIR) 10 MG tablet; Take 1 tablet by mouth Daily.  Dispense: 90 tablet; Refill: 1  -     losartan (COZAAR) 100 MG tablet; Take 1 tablet by mouth Daily.  Dispense: 90 tablet; Refill: 1  -     hydroCHLOROthiazide (HYDRODIURIL) 25 MG tablet; Take 1 tablet by mouth Daily.  Dispense: 90 tablet; Refill: 1  -     Cholecalciferol (EQL Vitamin D3) 25 MCG (1000 UT) capsule; Take 1 capsule by mouth Daily.  Dispense: 90 capsule; Refill: 3  -     amLODIPine (NORVASC) 5 MG tablet; Take 1 tablet by mouth Daily.  Dispense: 90 tablet; Refill: 1       Problem List Items Addressed This Visit        Active Problems    Asthma    Overview     Managed by Dr. Benito.           Relevant Medications    montelukast (SINGULAIR) 10 MG tablet    Benign hypertension - Primary    Current Assessment & Plan     Hypertension is improving with treatment.  Continue current treatment regimen.  Blood pressure will be reassessed at the next regular appointment.           Relevant Medications    losartan (COZAAR) 100 MG tablet    hydroCHLOROthiazide (HYDRODIURIL) 25 MG tablet     amLODIPine (NORVASC) 5 MG tablet    Hyperlipidemia    Current Assessment & Plan     Lipid abnormalities are improving with treatment.  Pharmacotherapy as ordered.  Lipids will be reassessed in 3 months.           Relevant Medications    rosuvastatin (Crestor) 40 MG tablet    Prediabetes    Current Assessment & Plan     We will recheck at 6-month point.  Reviewed last labs           Vitamin B12 deficiency    Current Assessment & Plan     We will recheck B12 level at next appointment           Palpitations    Current Assessment & Plan     Currently denies symptoms.  Holter monitor was normal.             Other Visit Diagnoses     Fatigue, unspecified type        Relevant Orders    TSH    Hypercalcemia        Relevant Orders    Calcium, Ionized          Follow Up   Return in about 18 weeks (around 10/4/2022) for Recheck htn and prediabetes.  Patient was given instructions and counseling regarding her condition or for health maintenance advice. Please see specific information pulled into the AVS if appropriate.

## 2022-06-02 LAB
CA-I SERPL ISE-MCNC: 6.4 MG/DL (ref 4.5–5.6)
TSH SERPL DL<=0.005 MIU/L-ACNC: 2.41 UIU/ML (ref 0.45–4.5)

## 2022-06-27 ENCOUNTER — TELEPHONE (OUTPATIENT)
Dept: FAMILY MEDICINE CLINIC | Facility: CLINIC | Age: 77
End: 2022-06-27

## 2022-06-27 NOTE — TELEPHONE ENCOUNTER
Spoke to patient and reminded her she needs to have PTH and Vitamin D labs completed. She advised she will get them done this week.

## 2022-06-29 LAB
25(OH)D3+25(OH)D2 SERPL-MCNC: 57 NG/ML (ref 30–100)
CALCIUM SERPL-MCNC: 10.6 MG/DL (ref 8.7–10.3)
INTACT PTH: ABNORMAL
PTH-INTACT SERPL-MCNC: 36 PG/ML (ref 15–65)

## 2022-08-15 ENCOUNTER — OFFICE VISIT (OUTPATIENT)
Dept: PULMONOLOGY | Facility: HOSPITAL | Age: 77
End: 2022-08-15

## 2022-08-15 VITALS
SYSTOLIC BLOOD PRESSURE: 144 MMHG | WEIGHT: 131 LBS | RESPIRATION RATE: 12 BRPM | BODY MASS INDEX: 24.11 KG/M2 | OXYGEN SATURATION: 98 % | HEART RATE: 81 BPM | HEIGHT: 62 IN | DIASTOLIC BLOOD PRESSURE: 77 MMHG

## 2022-08-15 DIAGNOSIS — J45.30 MILD PERSISTENT ASTHMA WITHOUT COMPLICATION: Primary | ICD-10-CM

## 2022-08-15 DIAGNOSIS — J30.9 ALLERGIC RHINITIS, UNSPECIFIED SEASONALITY, UNSPECIFIED TRIGGER: ICD-10-CM

## 2022-08-15 PROCEDURE — G0463 HOSPITAL OUTPT CLINIC VISIT: HCPCS

## 2022-08-15 NOTE — PROGRESS NOTES
SLEEP/PULMONARY  CLINIC NOTE      PATIENT IDENTIFICATION:  Name: Cara Castañeda  Age: 76 y.o.  Sex: female  :  1945  MRN: [id number]    DATE OF CONSULTATION:  8/15/2022                     CHIEF COMPLAINT: Follow-up on asthma    History of Present Illness:   Cara Castañeda is a 76 y.o. female's history of asthma she still have some dry cough on and off but improved significantly less coughing still have some white sputum's on and off using her Symbicort on daily basis finished her antibiotics  Record her insomnia still waking up a few times a week at night and difficulty going back to sleep she is not taking naps during the day      Review of Systems:   Constitutional: negative   Eyes: negative   ENT/oropharynx: negative   Cardiovascular: negative   Respiratory: negative   Gastrointestinal: negative   Genitourinary: negative   Neurological: negative   Musculoskeletal: negative   Integument/breast: negative   Endocrine: negative   Allergic/Immunologic: negative     Past Medical History:  Past Medical History:   Diagnosis Date   • Allergic rhinitis     on immunotherapy- Dr. Benito    • Asthma     Dr. Benito   • Bruit of left carotid artery 2020   • Carotid stenosis     <50%   • Cold sore    • Gallstone pancreatitis    • GERD (gastroesophageal reflux disease)     Dr. Oliva   • Hiatal hernia     small, sliding    • Hyperlipidemia    • Hypertension    • Left ventricular hypertrophy    • Osteoarthritis     Dr. Avila   • Osteopenia with high risk of fracture    • Pneumonia March, April, May,2022   • Pre-diabetes    • Vocal cord nodule     Dr. Nieves       Past Surgical History:  Past Surgical History:   Procedure Laterality Date   • APPENDECTOMY     • CHOLECYSTECTOMY  2006    Dr. Hernandez   • ENDOSCOPY  2017    & Dilation. Dr. Oliva   • ERCP  2006    Dr. Oliva   • HYSTERECTOMY  1974    ovaries remain, for benign reasons   • OTHER SURGICAL HISTORY  2009    Stress Echo. No ischemia. EF  60-65%.    • TONSILLECTOMY AND ADENOIDECTOMY     • TOTAL KNEE ARTHROPLASTY  08/31/2017        Family History:  Family History   Problem Relation Age of Onset   • Hypertension Mother    • Hypertension Father    • Heart disease Father    • Prostate cancer Paternal Grandfather         Social History:   Social History     Tobacco Use   • Smoking status: Never Smoker   • Smokeless tobacco: Never Used   Substance Use Topics   • Alcohol use: No        Allergies:  Allergies   Allergen Reactions   • Aspirin GI Bleeding   • Albuterol Sulfate Anxiety   • Atorvastatin Calcium Nausea Only   • Sulfa Antibiotics Nausea Only   • Tolterodine Nausea Only     Detrol       Home Meds:  (Not in a hospital admission)      Objective:    Vitals Ranges:   Heart Rate:  [81] 81  Resp:  [12] 12  BP: (144)/(77) 144/77  Body mass index is 23.96 kg/m².     Exam:  General Appearance:  WDWN    HEENT:   without obvious abnormality,  Conjunctiva/corneas clear,  Normal external ear canals, no drainage    Clear orsalmucosa,  Mallampati score 3    Neck:  Supple, symmetrical, trachea midline. No JVD.  Lungs:   Bilateral basal rhonchi bilaterally, respirations unlabored symmetrical wall movement.    Chest wall:  No tenderness or deformity.    Heart:  Regular rate and rhythm, S1 and S2 normal.  Extremities: Trace edema no clubbing or Cyanosis        Data Review:  All labs (24hrs): No results found for this or any previous visit (from the past 24 hour(s)).     Imaging:  Stress test with myocardial perfusion  · Left ventricular ejection fraction is hyperdynamic (Calculated EF >   70%). .  · Myocardial perfusion imaging indicates a normal myocardial perfusion   study with no evidence of ischemia.  · Impressions are consistent with a low risk study.  · This is normal Cardiolite imaging stress test with no evidence of   ischemia or myocardial infarction. Left ventricle size and function is   normal on gated SPECT imaging. No wall motion abnormality was noted.    Clinical correlation recommended. Further recommendation as per ordering   physician..  · Findings consistent with a normal ECG stress test.          ASSESSMENT:  Diagnoses and all orders for this visit:    Mild persistent asthma without complication    Allergic rhinitis, unspecified seasonality, unspecified trigger    Over more than 2 years pt started having trouble falling a sleep at night for 1-2 hours even dark quit bed room, tried different meds ( ambien, klonopin hydroxyzine, and over the over counter, also multiple wakening up at night every 2 hours 15-30...    PLAN:  Continue Symbicort  Bronchodilator inhaled corticosteroid  Education how to use inhalers  Encouraged to use incentive spirometer  Continue to exercise slowly as tolerated  Monitor for any change in the color of the sputum  Avoid any exposure to fumes, gas or any irritant     •Set a bedtime that is early enough for you to get at least 7 hours of sleep.  •Don’t go to bed unless you are sleepy.  •If you don’t fall asleep after 20 minutes, get out of bed.  •Establish a relaxing bedtime routine.  •Use your bed only for sleep and sex.  •Make your bedroom quiet and relaxing. Keep the room at a comfortable, cool temperature.  •Limit exposure to bright light in the evenings.  •Turn off electronic devices at least 30 minutes before bedtime.  •Don’t eat a large meal before bedtime. If you are hungry at night, eat a light, healthy snack.  •Exercise regularly and maintain a healthy diet.  •Avoid consuming caffeine in the late afternoon or evening.  •Avoid consuming alcohol before bedtime.  •Reduce your fluid intake before bedtime.  •Avoid naps during the day time.        Follow-up 6 months    Natty Hodge MD. D, ABSM.  8/15/2022  13:29 EDT

## 2022-09-06 ENCOUNTER — TELEPHONE (OUTPATIENT)
Dept: FAMILY MEDICINE CLINIC | Facility: CLINIC | Age: 77
End: 2022-09-06

## 2022-09-06 DIAGNOSIS — E83.52 HYPERCALCEMIA: Primary | ICD-10-CM

## 2022-09-06 NOTE — TELEPHONE ENCOUNTER
----- Message from Kelsi Thapa MA sent at 7/6/2022  1:42 PM EDT -----  Regarding: labs  Ionized calcium

## 2022-09-10 LAB — CA-I SERPL ISE-MCNC: 5.7 MG/DL (ref 4.5–5.6)

## 2022-10-04 ENCOUNTER — OFFICE VISIT (OUTPATIENT)
Dept: FAMILY MEDICINE CLINIC | Facility: CLINIC | Age: 77
End: 2022-10-04

## 2022-10-04 VITALS
HEART RATE: 93 BPM | HEIGHT: 62 IN | TEMPERATURE: 97.3 F | BODY MASS INDEX: 24.48 KG/M2 | SYSTOLIC BLOOD PRESSURE: 138 MMHG | OXYGEN SATURATION: 98 % | WEIGHT: 133 LBS | DIASTOLIC BLOOD PRESSURE: 78 MMHG | RESPIRATION RATE: 16 BRPM

## 2022-10-04 DIAGNOSIS — I10 BENIGN HYPERTENSION: ICD-10-CM

## 2022-10-04 DIAGNOSIS — E78.2 MIXED HYPERLIPIDEMIA: ICD-10-CM

## 2022-10-04 DIAGNOSIS — E83.52 HYPERCALCEMIA: ICD-10-CM

## 2022-10-04 DIAGNOSIS — R73.03 PREDIABETES: Primary | ICD-10-CM

## 2022-10-04 DIAGNOSIS — Z12.11 SCREENING FOR COLORECTAL CANCER: ICD-10-CM

## 2022-10-04 DIAGNOSIS — J18.9 PNEUMONIA OF LEFT LOWER LOBE DUE TO INFECTIOUS ORGANISM: ICD-10-CM

## 2022-10-04 DIAGNOSIS — M85.80 OSTEOPENIA WITH HIGH RISK OF FRACTURE: ICD-10-CM

## 2022-10-04 DIAGNOSIS — J45.30 MILD PERSISTENT ASTHMA WITHOUT COMPLICATION: ICD-10-CM

## 2022-10-04 DIAGNOSIS — Z12.12 SCREENING FOR COLORECTAL CANCER: ICD-10-CM

## 2022-10-04 DIAGNOSIS — R93.89 ABNORMAL CT OF THE CHEST: ICD-10-CM

## 2022-10-04 DIAGNOSIS — Z23 NEED FOR IMMUNIZATION AGAINST INFLUENZA: ICD-10-CM

## 2022-10-04 LAB
EXPIRATION DATE: NORMAL
HBA1C MFR BLD: 5.3 %
Lab: NORMAL

## 2022-10-04 PROCEDURE — 3044F HG A1C LEVEL LT 7.0%: CPT | Performed by: NURSE PRACTITIONER

## 2022-10-04 PROCEDURE — 99214 OFFICE O/P EST MOD 30 MIN: CPT | Performed by: NURSE PRACTITIONER

## 2022-10-04 PROCEDURE — G0008 ADMIN INFLUENZA VIRUS VAC: HCPCS | Performed by: NURSE PRACTITIONER

## 2022-10-04 PROCEDURE — 83036 HEMOGLOBIN GLYCOSYLATED A1C: CPT | Performed by: NURSE PRACTITIONER

## 2022-10-04 PROCEDURE — 90662 IIV NO PRSV INCREASED AG IM: CPT | Performed by: NURSE PRACTITIONER

## 2022-10-04 RX ORDER — CLOTRIMAZOLE AND BETAMETHASONE DIPROPIONATE 10; .64 MG/G; MG/G
1 CREAM TOPICAL 2 TIMES DAILY
Qty: 15 G | Refills: 1 | Status: SHIPPED | OUTPATIENT
Start: 2022-10-04 | End: 2023-02-23

## 2022-10-04 NOTE — ASSESSMENT & PLAN NOTE
Lipid abnormalities are unchanged.  Patient stopped medication will recheck on next labs.  Lipids will be reassessed in 6 months.

## 2022-10-04 NOTE — PROGRESS NOTES
Chief Complaint  Prediabetes and Hypertension    Subjective          Cara Castañeda presents to [facility] for prediabetes, hypertension    History of Present Illness      Patient has prediabetes and here for recheck today she has been watching her diet and getting some increase in activity.  She reports she recently saw pulmonary she is due for recheck CT on pneumonia and new nodules for stability.  We discussed ordering that today.  She is also due for Cologuard.  Patient has discontinued her cholesterol medication secondary to pain and swelling in her muscles and joints.  So she discontinued it it has resolved.  Is her second statin she is unable to tolerate and she does not want to try another one.      Cara Castañeda  has a past medical history of Allergic rhinitis, Asthma, Bruit of left carotid artery (02/20/2020), Carotid stenosis, Cold sore, Gallstone pancreatitis (2006), GERD (gastroesophageal reflux disease), Hiatal hernia, Hyperlipidemia, Hypertension, Left ventricular hypertrophy, Osteoarthritis, Osteopenia with high risk of fracture, Pneumonia (March, April, May,2022), Pre-diabetes, and Vocal cord nodule.      Review of Systems   Constitutional: Negative for fatigue and fever.   Respiratory: Negative for cough and shortness of breath.    Cardiovascular: Negative for chest pain.   Gastrointestinal: Negative for abdominal pain.        Objective       Current Outpatient Medications:   •  acetaminophen (TYLENOL) 325 MG tablet, Take  by mouth., Disp: , Rfl:   •  amLODIPine (NORVASC) 5 MG tablet, Take 1 tablet by mouth Daily., Disp: 90 tablet, Rfl: 1  •  budesonide-formoterol (SYMBICORT) 160-4.5 MCG/ACT inhaler, Inhale 2 puffs 2 (Two) Times a Day., Disp: 1 each, Rfl: 11  •  cetirizine (zyrTEC) 10 MG tablet, Take 10 mg by mouth Daily., Disp: , Rfl:   •  guaiFENesin (MUCINEX) 600 MG 12 hr tablet, Take 1,200 mg by mouth 2 (Two) Times a Day., Disp: , Rfl:   •   "hydroCHLOROthiazide (HYDRODIURIL) 25 MG tablet, Take 1 tablet by mouth Daily., Disp: 90 tablet, Rfl: 1  •  levalbuterol (XOPENEX HFA) 45 MCG/ACT inhaler, XOPENEX HFA 45 MCG/ACT AERO, Disp: , Rfl:   •  losartan (COZAAR) 100 MG tablet, Take 1 tablet by mouth Daily., Disp: 90 tablet, Rfl: 1  •  montelukast (SINGULAIR) 10 MG tablet, Take 1 tablet by mouth Daily., Disp: 90 tablet, Rfl: 1  •  Multiple Vitamins-Minerals (CENTRUM SILVER) tablet, CENTRUM SILVER TABS, Disp: , Rfl:   •  pantoprazole (PROTONIX) 40 MG EC tablet, Take 40 mg by mouth Daily., Disp: , Rfl:   •  risedronate (ACTONEL) 150 MG tablet, Take 1 tablet by mouth Every 30 (Thirty) Days. with water on empty stomach, nothing by mouth or lie down for next 30 minutes., Disp: 3 tablet, Rfl: 1  •  clotrimazole-betamethasone (Lotrisone) 1-0.05 % cream, Apply 1 application topically to the appropriate area as directed 2 (Two) Times a Day., Disp: 15 g, Rfl: 1    Vital Signs:      /78   Pulse 93   Temp 97.3 °F (36.3 °C) (Infrared)   Resp 16   Ht 157.5 cm (62\")   Wt 60.3 kg (133 lb)   SpO2 98%   BMI 24.33 kg/m²     Vitals:    10/04/22 0843 10/04/22 0904   BP: 143/77 138/78   BP Location: Left arm    Patient Position: Sitting    Cuff Size: Adult    Pulse: 93    Resp: 16    Temp: 97.3 °F (36.3 °C)    TempSrc: Infrared    SpO2: 98%    Weight: 60.3 kg (133 lb)    Height: 157.5 cm (62\")       Physical Exam  Vitals and nursing note reviewed.   Constitutional:       Appearance: She is well-developed.   Cardiovascular:      Rate and Rhythm: Normal rate and regular rhythm.      Heart sounds: Normal heart sounds. No murmur heard.    No friction rub. No gallop.   Pulmonary:      Effort: Pulmonary effort is normal.      Breath sounds: Normal breath sounds. No wheezing or rales.   Abdominal:      General: Bowel sounds are normal.      Palpations: Abdomen is soft.      Tenderness: There is no abdominal tenderness.   Skin:     General: Skin is warm and dry. "   Neurological:      Mental Status: She is alert.   Psychiatric:         Mood and Affect: Mood normal.         Behavior: Behavior normal.         Thought Content: Thought content normal.         Judgment: Judgment normal.        Result Review :                  PHQ-9 Total Score:             Assessment and Plan    Diagnoses and all orders for this visit:    1. Prediabetes (Primary)  Assessment & Plan:  Great improvement in A1c.  Continue diet and    Orders:  -     POC Glycosylated Hemoglobin (Hb A1C)    2. Hypercalcemia  Comments:  Calcium is normalizing after discontinuing a supple    3. Mild persistent asthma without complication    4. Need for immunization against influenza  -     Fluzone High-Dose 65+yrs    5. Abnormal CT of the chest  -     CT Chest Without Contrast; Future    6. Pneumonia of left lower lobe due to infectious organism  Comments:  Rechecking CT.    7. Screening for colorectal cancer  -     Cologuard - Stool, Per Rectum; Future    8. Benign hypertension  Assessment & Plan:  Hypertension is improving with treatment.  Continue current treatment regimen.  Blood pressure will be reassessed at the next regular appointment.      9. Mixed hyperlipidemia  Assessment & Plan:  Lipid abnormalities are unchanged.  Patient stopped medication will recheck on next labs.  Lipids will be reassessed in 6 months.      10. Osteopenia with high risk of fracture  Assessment & Plan:  Patient is on Actonel and doing well.  Recheck osteoporosis DEXA scan at next indicated time      Other orders  -     clotrimazole-betamethasone (Lotrisone) 1-0.05 % cream; Apply 1 application topically to the appropriate area as directed 2 (Two) Times a Day.  Dispense: 15 g; Refill: 1       Problem List Items Addressed This Visit        Active Problems    Asthma    Overview     Managed by Dr. Benito.         Benign hypertension    Current Assessment & Plan     Hypertension is improving with treatment.  Continue current treatment  regimen.  Blood pressure will be reassessed at the next regular appointment.         Hyperlipidemia    Current Assessment & Plan     Lipid abnormalities are unchanged.  Patient stopped medication will recheck on next labs.  Lipids will be reassessed in 6 months.         Prediabetes - Primary    Current Assessment & Plan     Great improvement in A1c.  Continue diet and         Relevant Orders    POC Glycosylated Hemoglobin (Hb A1C) (Completed)    Osteopenia with high risk of fracture    Current Assessment & Plan     Patient is on Actonel and doing well.  Recheck osteoporosis DEXA scan at next indicated time           Other Visit Diagnoses     Hypercalcemia        Calcium is normalizing after discontinuing a supple    Need for immunization against influenza        Relevant Orders    Fluzone High-Dose 65+yrs (Completed)    Abnormal CT of the chest        Relevant Orders    CT Chest Without Contrast    Pneumonia of left lower lobe due to infectious organism        Rechecking CT.    Screening for colorectal cancer        Relevant Orders    Cologuard - Stool, Per Rectum          Follow Up   Return in about 6 months (around 3/27/2023) for Medicare Wellness, Recheck and diabetes.  Patient was given instructions and counseling regarding her condition or for health maintenance advice. Please see specific information pulled into the AVS if appropriate.

## 2022-10-06 ENCOUNTER — HOSPITAL ENCOUNTER (OUTPATIENT)
Dept: CT IMAGING | Facility: HOSPITAL | Age: 77
Discharge: HOME | End: 2022-10-06
Admitting: NURSE PRACTITIONER

## 2022-10-06 DIAGNOSIS — R93.89 ABNORMAL CT OF THE CHEST: ICD-10-CM

## 2022-10-06 PROCEDURE — 71250 CT THORAX DX C-: CPT

## 2022-11-22 RX ORDER — LOSARTAN POTASSIUM 100 MG/1
TABLET ORAL
Qty: 90 TABLET | Refills: 1 | Status: SHIPPED | OUTPATIENT
Start: 2022-11-22

## 2022-12-19 RX ORDER — HYDROCHLOROTHIAZIDE 25 MG/1
TABLET ORAL
Qty: 90 TABLET | Refills: 1 | Status: SHIPPED | OUTPATIENT
Start: 2022-12-19 | End: 2023-04-04

## 2023-01-11 ENCOUNTER — OFFICE VISIT (OUTPATIENT)
Dept: FAMILY MEDICINE CLINIC | Facility: CLINIC | Age: 78
End: 2023-01-11
Payer: MEDICARE

## 2023-01-11 VITALS
OXYGEN SATURATION: 97 % | HEIGHT: 62 IN | TEMPERATURE: 98 F | RESPIRATION RATE: 16 BRPM | BODY MASS INDEX: 25.03 KG/M2 | DIASTOLIC BLOOD PRESSURE: 84 MMHG | WEIGHT: 136 LBS | SYSTOLIC BLOOD PRESSURE: 162 MMHG | HEART RATE: 82 BPM

## 2023-01-11 DIAGNOSIS — I10 BENIGN HYPERTENSION: ICD-10-CM

## 2023-01-11 DIAGNOSIS — M15.9 GENERALIZED OSTEOARTHRITIS: Primary | ICD-10-CM

## 2023-01-11 DIAGNOSIS — M25.59 PAIN IN OTHER JOINT: ICD-10-CM

## 2023-01-11 PROCEDURE — 99214 OFFICE O/P EST MOD 30 MIN: CPT | Performed by: NURSE PRACTITIONER

## 2023-01-11 NOTE — PROGRESS NOTES
Chief Complaint  Hand Pain    Subjective          Cara Castañeda presents to [facility] for hand and wrist pain.    History of Present Illness    Patient has noticed over the last few weeks increase in pain in and swelling in her hands and her joint and her elbows.  She tried some Voltaren cream x1 dose yesterday.  She did not notice much difference.  She does not have any increased activity or problem with using her hands recently.  Other joints are not increasing in pain.  She has no known injury or fall    Her blood pressure is mildly elevated today she has been taking her medicine as prescribed.  She has not noticed or checked her blood pressure being up until today.  She will check it over the next few days may need to change a medication.      Cara Castañeda  has a past medical history of Allergic rhinitis, Asthma, Bruit of left carotid artery (02/20/2020), Carotid stenosis, Cold sore, Gallstone pancreatitis (2006), GERD (gastroesophageal reflux disease), Hiatal hernia, Hyperlipidemia, Hypertension, Left ventricular hypertrophy, Osteoarthritis, Osteopenia with high risk of fracture, Pneumonia (March, April, May,2022), Pre-diabetes, and Vocal cord nodule.      Review of Systems   Constitutional: Negative for fatigue and fever.   Respiratory: Negative for cough and shortness of breath.    Cardiovascular: Negative for chest pain and palpitations.   Musculoskeletal: Positive for arthralgias and joint swelling.        Objective       Current Outpatient Medications:   •  acetaminophen (TYLENOL) 325 MG tablet, Take  by mouth., Disp: , Rfl:   •  amLODIPine (NORVASC) 5 MG tablet, Take 1 tablet by mouth Daily., Disp: 90 tablet, Rfl: 1  •  budesonide-formoterol (SYMBICORT) 160-4.5 MCG/ACT inhaler, Inhale 2 puffs 2 (Two) Times a Day., Disp: 1 each, Rfl: 11  •  cetirizine (zyrTEC) 10 MG tablet, Take 10 mg by mouth Daily., Disp: , Rfl:   •  clotrimazole-betamethasone (Lotrisone) 1-0.05  "% cream, Apply 1 application topically to the appropriate area as directed 2 (Two) Times a Day., Disp: 15 g, Rfl: 1  •  guaiFENesin (MUCINEX) 600 MG 12 hr tablet, Take 1,200 mg by mouth 2 (Two) Times a Day., Disp: , Rfl:   •  hydroCHLOROthiazide (HYDRODIURIL) 25 MG tablet, TAKE ONE TABLET BY MOUTH ONCE DAILY, Disp: 90 tablet, Rfl: 1  •  levalbuterol (XOPENEX HFA) 45 MCG/ACT inhaler, XOPENEX HFA 45 MCG/ACT AERO, Disp: , Rfl:   •  losartan (COZAAR) 100 MG tablet, TAKE ONE TABLET BY MOUTH ONCE DAILY, Disp: 90 tablet, Rfl: 1  •  montelukast (SINGULAIR) 10 MG tablet, Take 1 tablet by mouth Daily., Disp: 90 tablet, Rfl: 1  •  Multiple Vitamins-Minerals (CENTRUM SILVER) tablet, CENTRUM SILVER TABS, Disp: , Rfl:   •  pantoprazole (PROTONIX) 40 MG EC tablet, Take 40 mg by mouth Daily., Disp: , Rfl:   •  risedronate (ACTONEL) 150 MG tablet, Take 1 tablet by mouth Every 30 (Thirty) Days. with water on empty stomach, nothing by mouth or lie down for next 30 minutes., Disp: 3 tablet, Rfl: 1    Vital Signs:      /84 (BP Location: Left arm)   Pulse 82   Temp 98 °F (36.7 °C) (Infrared)   Resp 16   Ht 157.5 cm (62\")   Wt 61.7 kg (136 lb)   SpO2 97%   BMI 24.87 kg/m²     Vitals:    01/11/23 1318 01/11/23 1321 01/11/23 1331 01/11/23 1332   BP: 164/78 174/83 144/84 162/84   BP Location: Left arm Left arm Right arm Left arm   Patient Position: Sitting Sitting     Cuff Size: Small Adult Small Adult     Pulse: 82      Resp: 16      Temp: 98 °F (36.7 °C)      TempSrc: Infrared      SpO2: 97%      Weight: 61.7 kg (136 lb)      Height: 157.5 cm (62\")         Physical Exam  Vitals and nursing note reviewed.   Constitutional:       Appearance: She is well-developed.   Cardiovascular:      Rate and Rhythm: Normal rate and regular rhythm.      Heart sounds: Normal heart sounds. No murmur heard.    No friction rub. No gallop.   Pulmonary:      Effort: Pulmonary effort is normal.      Breath sounds: Normal breath sounds. No wheezing " or rales.   Musculoskeletal:      Comments: Bilateral hands with some swelling in the finger joints as well as the hand.  Mild warmth.  Bilateral elbows are warm.  Full range of motion all extremities with pain   Skin:     General: Skin is warm and dry.   Neurological:      Mental Status: She is alert.        Result Review :                  PHQ-9 Total Score: 0           Assessment and Plan    Diagnoses and all orders for this visit:    1. Generalized osteoarthritis (Primary)  Comments:  Labs today.  We will try Voltaren gel.  May need prednisone but blood pressure will have to be adapt  Orders:  -     CBC & Differential  -     Comprehensive Metabolic Panel  -     C-reactive Protein  -     Sedimentation Rate    2. Pain in other joint  -     CBC & Differential    3. Benign hypertension  Assessment & Plan:  Hypertension is worsening.  Continue current medications.  Ambulatory blood pressure monitoring.  Blood pressure will be reassessed at the next regular appointment.  Patient is to monitor blood pressure couple times a day at home.  May need to increase amlodipine         Problem List Items Addressed This Visit        Active Problems    Benign hypertension    Current Assessment & Plan     Hypertension is worsening.  Continue current medications.  Ambulatory blood pressure monitoring.  Blood pressure will be reassessed at the next regular appointment.  Patient is to monitor blood pressure couple times a day at home.  May need to increase amlodipine         Generalized osteoarthritis - Primary    Relevant Orders    CBC & Differential    Comprehensive Metabolic Panel    C-reactive Protein    Sedimentation Rate   Other Visit Diagnoses     Pain in other joint        Relevant Orders    CBC & Differential          Follow Up   Return for Next scheduled follow up.  Patient was given instructions and counseling regarding her condition or for health maintenance advice. Please see specific information pulled into the AVS if  appropriate.

## 2023-01-11 NOTE — ASSESSMENT & PLAN NOTE
Hypertension is worsening.  Continue current medications.  Ambulatory blood pressure monitoring.  Blood pressure will be reassessed at the next regular appointment.  Patient is to monitor blood pressure couple times a day at home.  May need to increase amlodipine

## 2023-01-12 ENCOUNTER — TELEPHONE (OUTPATIENT)
Dept: FAMILY MEDICINE CLINIC | Facility: CLINIC | Age: 78
End: 2023-01-12
Payer: MEDICARE

## 2023-01-12 LAB
ALBUMIN SERPL-MCNC: 4.4 G/DL (ref 3.7–4.7)
ALBUMIN/GLOB SERPL: 1.7 {RATIO} (ref 1.2–2.2)
ALP SERPL-CCNC: 92 IU/L (ref 44–121)
ALT SERPL-CCNC: 13 IU/L (ref 0–32)
AST SERPL-CCNC: 17 IU/L (ref 0–40)
BASOPHILS # BLD AUTO: 0.1 X10E3/UL (ref 0–0.2)
BASOPHILS NFR BLD AUTO: 1 %
BILIRUB SERPL-MCNC: 0.2 MG/DL (ref 0–1.2)
BUN SERPL-MCNC: 12 MG/DL (ref 8–27)
BUN/CREAT SERPL: 17 (ref 12–28)
CALCIUM SERPL-MCNC: 10 MG/DL (ref 8.7–10.3)
CHLORIDE SERPL-SCNC: 93 MMOL/L (ref 96–106)
CO2 SERPL-SCNC: 22 MMOL/L (ref 20–29)
CREAT SERPL-MCNC: 0.69 MG/DL (ref 0.57–1)
CRP SERPL-MCNC: 12 MG/L (ref 0–10)
EGFRCR SERPLBLD CKD-EPI 2021: 88 ML/MIN/1.73
EOSINOPHIL # BLD AUTO: 0.2 X10E3/UL (ref 0–0.4)
EOSINOPHIL NFR BLD AUTO: 3 %
ERYTHROCYTE [DISTWIDTH] IN BLOOD BY AUTOMATED COUNT: 12.7 % (ref 11.7–15.4)
ERYTHROCYTE [SEDIMENTATION RATE] IN BLOOD BY WESTERGREN METHOD: 47 MM/HR (ref 0–40)
GLOBULIN SER CALC-MCNC: 2.6 G/DL (ref 1.5–4.5)
GLUCOSE SERPL-MCNC: 97 MG/DL (ref 70–99)
HCT VFR BLD AUTO: 37.7 % (ref 34–46.6)
HGB BLD-MCNC: 12.6 G/DL (ref 11.1–15.9)
IMM GRANULOCYTES # BLD AUTO: 0 X10E3/UL (ref 0–0.1)
IMM GRANULOCYTES NFR BLD AUTO: 0 %
LYMPHOCYTES # BLD AUTO: 1.2 X10E3/UL (ref 0.7–3.1)
LYMPHOCYTES NFR BLD AUTO: 16 %
MCH RBC QN AUTO: 28.5 PG (ref 26.6–33)
MCHC RBC AUTO-ENTMCNC: 33.4 G/DL (ref 31.5–35.7)
MCV RBC AUTO: 85 FL (ref 79–97)
MONOCYTES # BLD AUTO: 0.7 X10E3/UL (ref 0.1–0.9)
MONOCYTES NFR BLD AUTO: 9 %
NEUTROPHILS # BLD AUTO: 5.6 X10E3/UL (ref 1.4–7)
NEUTROPHILS NFR BLD AUTO: 71 %
PLATELET # BLD AUTO: 425 X10E3/UL (ref 150–450)
POTASSIUM SERPL-SCNC: 3.6 MMOL/L (ref 3.5–5.2)
PROT SERPL-MCNC: 7 G/DL (ref 6–8.5)
RBC # BLD AUTO: 4.42 X10E6/UL (ref 3.77–5.28)
SODIUM SERPL-SCNC: 131 MMOL/L (ref 134–144)
WBC # BLD AUTO: 7.8 X10E3/UL (ref 3.4–10.8)

## 2023-01-12 RX ORDER — PREDNISONE 10 MG/1
TABLET ORAL
Qty: 20 TABLET | Refills: 0 | Status: SHIPPED | OUTPATIENT
Start: 2023-01-12 | End: 2023-02-23

## 2023-01-12 NOTE — TELEPHONE ENCOUNTER
Caller: MaddyCara    Relationship: Self    Best call back number: [phone]    What medication are you requesting: STEROIDS     What are your current symptoms: ARTHRITIS PAIN IN JOINTS. AS WELL AS SWELLING AND REDNESS    How long have you been experiencing symptoms: 1 WEEK    Have you had these symptoms before:    [x] Yes  [] No    Have you been treated for these symptoms before:   [x] Yes  [] No    If a prescription is needed, what is your preferred pharmacy and phone number: [facility]. [address] [phone] [place] [phone] FX     Additional notes:STATES THIS IS THE FIRST TIME SHE HAS HAD REDNESS AND SWELLING THIS BADLY. 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 LAST NIGHT IT /69. THEN THIS MORNING IS /62.

## 2023-01-12 NOTE — TELEPHONE ENCOUNTER
I sent her in the steroids, since her b/p was better.  I will call her after the labs return.  Watch b/p, if steroids increase it, stop taking them.

## 2023-01-31 ENCOUNTER — TELEPHONE (OUTPATIENT)
Dept: FAMILY MEDICINE CLINIC | Facility: CLINIC | Age: 78
End: 2023-01-31

## 2023-01-31 DIAGNOSIS — E53.8 VITAMIN B12 DEFICIENCY: ICD-10-CM

## 2023-01-31 DIAGNOSIS — E87.1 LOW SODIUM LEVELS: ICD-10-CM

## 2023-01-31 DIAGNOSIS — R73.03 PREDIABETES: ICD-10-CM

## 2023-01-31 DIAGNOSIS — M15.9 GENERALIZED OSTEOARTHRITIS: Primary | ICD-10-CM

## 2023-01-31 NOTE — TELEPHONE ENCOUNTER
Caller: Cara Castañeda    Relationship: Self    Best call back number: [phone]    What orders are you requesting (i.e. lab or imaging): LABS      Where will you receive your lab/imaging services: [facility]     Additional notes: IF LABS ARE TO BE DRAWN AT THE 2/7 VISIT SCHEDULED, COULD THEY BE ORDERED SO SHE CAN GET THEM DONE BEFOREHAND.

## 2023-01-31 NOTE — TELEPHONE ENCOUNTER
Please inform patient the orders are in for her to recheck her inflammatory markers and her sodium level.

## 2023-02-05 LAB
ALBUMIN SERPL-MCNC: 4.2 G/DL (ref 3.7–4.7)
ALBUMIN/GLOB SERPL: 1.9 {RATIO} (ref 1.2–2.2)
ALP SERPL-CCNC: 91 IU/L (ref 44–121)
ALT SERPL-CCNC: 12 IU/L (ref 0–32)
AST SERPL-CCNC: 15 IU/L (ref 0–40)
BILIRUB SERPL-MCNC: 0.2 MG/DL (ref 0–1.2)
BUN SERPL-MCNC: 11 MG/DL (ref 8–27)
BUN/CREAT SERPL: 15 (ref 12–28)
CALCIUM SERPL-MCNC: 10.1 MG/DL (ref 8.7–10.3)
CHLORIDE SERPL-SCNC: 102 MMOL/L (ref 96–106)
CO2 SERPL-SCNC: 22 MMOL/L (ref 20–29)
CREAT SERPL-MCNC: 0.72 MG/DL (ref 0.57–1)
CRP SERPL-MCNC: 20 MG/L (ref 0–10)
EGFRCR SERPLBLD CKD-EPI 2021: 85 ML/MIN/1.73
ERYTHROCYTE [SEDIMENTATION RATE] IN BLOOD BY WESTERGREN METHOD: 26 MM/HR (ref 0–40)
GLOBULIN SER CALC-MCNC: 2.2 G/DL (ref 1.5–4.5)
GLUCOSE SERPL-MCNC: 97 MG/DL (ref 70–99)
POTASSIUM SERPL-SCNC: 4.1 MMOL/L (ref 3.5–5.2)
PROT SERPL-MCNC: 6.4 G/DL (ref 6–8.5)
SODIUM SERPL-SCNC: 138 MMOL/L (ref 134–144)
VIT B12 SERPL-MCNC: 761 PG/ML (ref 232–1245)

## 2023-02-07 ENCOUNTER — OFFICE VISIT (OUTPATIENT)
Dept: FAMILY MEDICINE CLINIC | Facility: CLINIC | Age: 78
End: 2023-02-07
Payer: MEDICARE

## 2023-02-07 VITALS
BODY MASS INDEX: 25.8 KG/M2 | HEIGHT: 62 IN | HEART RATE: 102 BPM | RESPIRATION RATE: 18 BRPM | TEMPERATURE: 96.9 F | WEIGHT: 140.2 LBS | DIASTOLIC BLOOD PRESSURE: 84 MMHG | OXYGEN SATURATION: 94 % | SYSTOLIC BLOOD PRESSURE: 128 MMHG

## 2023-02-07 DIAGNOSIS — E87.1 LOW SODIUM LEVELS: ICD-10-CM

## 2023-02-07 DIAGNOSIS — M15.9 GENERALIZED OSTEOARTHRITIS: Primary | ICD-10-CM

## 2023-02-07 PROCEDURE — 99213 OFFICE O/P EST LOW 20 MIN: CPT | Performed by: NURSE PRACTITIONER

## 2023-02-07 NOTE — PROGRESS NOTES
Chief Complaint  Arthritis    Subjective          Cara Castañeda presents to [facility] for arthritis    History of Present Illness    Patient had a flare of arthritis recently that required prednisone.  She is somewhat better.  Her C-reactive protein and sed rate were both elevated during time she had the flare.  She also had a sodium decrease.  She was instructed to take her hydrochlorothiazide at 12.5 mg as opposed to 25.  That corrected after this medication change.  Overall her hands feel better and are not as red but she does have pain in her elbows shoulders and knees still.  Typically she is not taking anything every day for arthritis.  She has a brother who has rheumatoid but has never had occasion to be tested previous to this time.  He does not get much relief with Tylenol    Cara Castañeda  has a past medical history of Allergic rhinitis, Asthma, Bruit of left carotid artery (02/20/2020), Carotid stenosis, Cold sore, Gallstone pancreatitis (2006), GERD (gastroesophageal reflux disease), Hiatal hernia, Hyperlipidemia, Hypertension, Left ventricular hypertrophy, Osteoarthritis, Osteopenia with high risk of fracture, Pneumonia (March, April, May,2022), Pre-diabetes, and Vocal cord nodule.      Review of Systems   Constitutional: Negative for fatigue and fever.   Respiratory: Negative for cough and shortness of breath.    Cardiovascular: Negative for chest pain and palpitations.   Musculoskeletal: Positive for arthralgias and joint swelling.        Objective       Current Outpatient Medications:   •  acetaminophen (TYLENOL) 325 MG tablet, Take  by mouth., Disp: , Rfl:   •  amLODIPine (NORVASC) 5 MG tablet, Take 1 tablet by mouth Daily., Disp: 90 tablet, Rfl: 1  •  budesonide-formoterol (SYMBICORT) 160-4.5 MCG/ACT inhaler, Inhale 2 puffs 2 (Two) Times a Day., Disp: 1 each, Rfl: 11  •  cetirizine (zyrTEC) 10 MG tablet, Take 10 mg by mouth Daily., Disp: , Rfl:   •   "guaiFENesin (MUCINEX) 600 MG 12 hr tablet, Take 1,200 mg by mouth 2 (Two) Times a Day., Disp: , Rfl:   •  hydroCHLOROthiazide (HYDRODIURIL) 25 MG tablet, TAKE ONE TABLET BY MOUTH ONCE DAILY (Patient taking differently: 12.5 mg.), Disp: 90 tablet, Rfl: 1  •  levalbuterol (XOPENEX HFA) 45 MCG/ACT inhaler, XOPENEX HFA 45 MCG/ACT AERO, Disp: , Rfl:   •  losartan (COZAAR) 100 MG tablet, TAKE ONE TABLET BY MOUTH ONCE DAILY, Disp: 90 tablet, Rfl: 1  •  montelukast (SINGULAIR) 10 MG tablet, Take 1 tablet by mouth Daily., Disp: 90 tablet, Rfl: 1  •  Multiple Vitamins-Minerals (CENTRUM SILVER) tablet, CENTRUM SILVER TABS, Disp: , Rfl:   •  pantoprazole (PROTONIX) 40 MG EC tablet, Take 40 mg by mouth Daily., Disp: , Rfl:   •  risedronate (ACTONEL) 150 MG tablet, Take 1 tablet by mouth Every 30 (Thirty) Days. with water on empty stomach, nothing by mouth or lie down for next 30 minutes., Disp: 3 tablet, Rfl: 1  •  clotrimazole-betamethasone (Lotrisone) 1-0.05 % cream, Apply 1 application topically to the appropriate area as directed 2 (Two) Times a Day., Disp: 15 g, Rfl: 1  •  diclofenac (VOLTAREN) 50 MG EC tablet, Take 1 tablet by mouth 2 (Two) Times a Day As Needed (joint pain)., Disp: 60 tablet, Rfl: 0  •  predniSONE (DELTASONE) 10 MG tablet, Day 1 and 2 - 4 tabs, Day 3 and 4 - 3 tabs,Day 5 and 6 - 2 tabs,Day 7 and 8  - 1 tab, Disp: 20 tablet, Rfl: 0    Vital Signs:      /84   Pulse 102   Temp 96.9 °F (36.1 °C) (Infrared)   Resp 18   Ht 157.5 cm (62.01\")   Wt 63.6 kg (140 lb 3.2 oz)   SpO2 94%   BMI 25.64 kg/m²     Vitals:    02/07/23 0921 02/07/23 0928 02/07/23 0944   BP: 171/83 152/81 128/84   BP Location: Right arm Right arm    Patient Position: Sitting Sitting    Cuff Size: Adult Adult    Pulse: 102     Resp: 18     Temp: 96.9 °F (36.1 °C)     TempSrc: Infrared     SpO2: 94%     Weight: 63.6 kg (140 lb 3.2 oz)     Height: 157.5 cm (62.01\")     PainSc:   5     PainLoc: Knee        Physical Exam   Result " Review :                  PHQ-9 Total Score: 0           Assessment and Plan    Diagnoses and all orders for this visit:    1. Generalized osteoarthritis (Primary)  Comments:  We will start NSAID.  If not improving will test rheumatoid factors.  Discussed risk and benefits of an    2. BMI 25.0-25.9,adult    3. Low sodium levels  Comments:  Corrected with change in medication dose of hydrochlorothiazide    Other orders  -     diclofenac (VOLTAREN) 50 MG EC tablet; Take 1 tablet by mouth 2 (Two) Times a Day As Needed (joint pain).  Dispense: 60 tablet; Refill: 0         Problem List Items Addressed This Visit        Active Problems    Generalized osteoarthritis - Primary    BMI 25.0-25.9,adult   Other Visit Diagnoses     Low sodium levels        Corrected with change in medication dose of hydrochlorothiazide          Follow Up   Return for Next scheduled follow up.  Patient was given instructions and counseling regarding her condition or for health maintenance advice. Please see specific information pulled into the AVS if appropriate.

## 2023-02-14 ENCOUNTER — TELEPHONE (OUTPATIENT)
Dept: FAMILY MEDICINE CLINIC | Facility: CLINIC | Age: 78
End: 2023-02-14
Payer: MEDICARE

## 2023-02-14 RX ORDER — MELOXICAM 15 MG/1
15 TABLET ORAL DAILY
Qty: 30 TABLET | Refills: 12 | Status: SHIPPED | OUTPATIENT
Start: 2023-02-14 | End: 2023-02-23

## 2023-02-14 NOTE — TELEPHONE ENCOUNTER
Caller: Cara Castañeda    Relationship: Self    Best call back number:   [phone] (Mobile)    What medication are you requesting: ANOTHER MED FOR ARTHRITIS PAIN     What are your current symptoms: ARTHRITIS PAIN      How long have you been experiencing symptoms: WAS SEEN LAST WEEK , AND MEDS NOT HELPING     Have you had these symptoms before:    [x] Yes  [] No    Have you been treated for these symptoms before:   [x] Yes  [] No    If a prescription is needed, what is your preferred pharmacy and phone number: [facility]. - [address] [phone] [place] [phone] FX     Additional notes:

## 2023-02-14 NOTE — TELEPHONE ENCOUNTER
Please inform patient I sent in meloxicam 15 mg which is a once daily arthritis medication to [facility].  She can discontinue the other medication and start this to see if it helps her better.

## 2023-02-22 ENCOUNTER — TELEPHONE (OUTPATIENT)
Dept: FAMILY MEDICINE CLINIC | Facility: CLINIC | Age: 78
End: 2023-02-22

## 2023-02-22 DIAGNOSIS — M25.50 ARTHRALGIA, UNSPECIFIED JOINT: ICD-10-CM

## 2023-02-22 DIAGNOSIS — M15.9 GENERALIZED OSTEOARTHRITIS: Primary | ICD-10-CM

## 2023-02-22 NOTE — TELEPHONE ENCOUNTER
Caller: Cara Castañeda    Relationship: Self    Best call back number: [phone]    What medications are you currently taking:   Current Outpatient Medications on File Prior to Visit   Medication Sig Dispense Refill   • acetaminophen (TYLENOL) 325 MG tablet Take  by mouth.     • amLODIPine (NORVASC) 5 MG tablet Take 1 tablet by mouth Daily. 90 tablet 1   • budesonide-formoterol (SYMBICORT) 160-4.5 MCG/ACT inhaler Inhale 2 puffs 2 (Two) Times a Day. 1 each 11   • cetirizine (zyrTEC) 10 MG tablet Take 10 mg by mouth Daily.     • clotrimazole-betamethasone (Lotrisone) 1-0.05 % cream Apply 1 application topically to the appropriate area as directed 2 (Two) Times a Day. 15 g 1   • guaiFENesin (MUCINEX) 600 MG 12 hr tablet Take 1,200 mg by mouth 2 (Two) Times a Day.     • hydroCHLOROthiazide (HYDRODIURIL) 25 MG tablet TAKE ONE TABLET BY MOUTH ONCE DAILY (Patient taking differently: 12.5 mg.) 90 tablet 1   • levalbuterol (XOPENEX HFA) 45 MCG/ACT inhaler XOPENEX HFA 45 MCG/ACT AERO     • losartan (COZAAR) 100 MG tablet TAKE ONE TABLET BY MOUTH ONCE DAILY 90 tablet 1   • meloxicam (MOBIC) 15 MG tablet Take 1 tablet by mouth Daily. 30 tablet 12   • montelukast (SINGULAIR) 10 MG tablet Take 1 tablet by mouth Daily. 90 tablet 1   • Multiple Vitamins-Minerals (CENTRUM SILVER) tablet CENTRUM SILVER TABS     • pantoprazole (PROTONIX) 40 MG EC tablet Take 40 mg by mouth Daily.     • predniSONE (DELTASONE) 10 MG tablet Day 1 and 2 - 4 tabs, Day 3 and 4 - 3 tabs,Day 5 and 6 - 2 tabs,Day 7 and 8  - 1 tab 20 tablet 0   • risedronate (ACTONEL) 150 MG tablet Take 1 tablet by mouth Every 30 (Thirty) Days. with water on empty stomach, nothing by mouth or lie down for next 30 minutes. 3 tablet 1     No current facility-administered medications on file prior to visit.        Which medication are you concerned about: MELOXICAM    Who prescribed you this medication: LEATHA BROOKE    What are your concerns: PATIENT STATES SHE HAS BEEN  TAKING THIS MEDICATION AND IT HAS NOT BEEN HELPING. SHE IS STILL IN A LOT OF PAIN AND IT IS AFFECTING HER BEING ABLE TO SLEEP. PLEASE ADVISE ON NEXT STEPS.

## 2023-02-23 ENCOUNTER — OFFICE VISIT (OUTPATIENT)
Dept: PULMONOLOGY | Facility: HOSPITAL | Age: 78
End: 2023-02-23
Payer: MEDICARE

## 2023-02-23 VITALS
HEIGHT: 62 IN | BODY MASS INDEX: 25.4 KG/M2 | HEART RATE: 88 BPM | RESPIRATION RATE: 14 BRPM | WEIGHT: 138 LBS | SYSTOLIC BLOOD PRESSURE: 154 MMHG | OXYGEN SATURATION: 97 % | DIASTOLIC BLOOD PRESSURE: 84 MMHG

## 2023-02-23 DIAGNOSIS — J45.30 MILD PERSISTENT ASTHMA WITHOUT COMPLICATION: Primary | ICD-10-CM

## 2023-02-23 DIAGNOSIS — F51.01 PRIMARY INSOMNIA: ICD-10-CM

## 2023-02-23 DIAGNOSIS — K21.9 GASTROESOPHAGEAL REFLUX DISEASE WITHOUT ESOPHAGITIS: ICD-10-CM

## 2023-02-23 PROCEDURE — G0463 HOSPITAL OUTPT CLINIC VISIT: HCPCS

## 2023-02-23 RX ORDER — PREDNISONE 20 MG/1
20 TABLET ORAL 2 TIMES DAILY
Qty: 10 TABLET | Refills: 0 | Status: SHIPPED | OUTPATIENT
Start: 2023-02-23 | End: 2023-03-01

## 2023-02-23 NOTE — PROGRESS NOTES
SLEEP/PULMONARY  CLINIC NOTE      PATIENT IDENTIFICATION:  Name: Cara Castañeda  Age: 77 y.o.  Sex: female  :  1945  MRN: [id number]    DATE OF CONSULTATION:  2023                     CHIEF COMPLAINT: Follow-up on asthma    History of Present Illness:   Cara Castañeda is a 77 y.o. female patient with history of asthma currently on Symbicort no shortness of breath no coughing still having intermittent cough with white sputum's, no hemoptysis no fever no chills no change in her weight recently    Patient reported to me that her arthritis especially in her hand is causing comfortable for her sleeping at night and making her to sleep only 2 to 3 hours only      Review of Systems:   Constitutional:  As above   Eyes: negative   ENT/oropharynx: negative   Cardiovascular: negative   Respiratory:  As above   Gastrointestinal: negative   Genitourinary: negative   Neurological: negative   Musculoskeletal: negative   Integument/breast: negative   Endocrine: negative   Allergic/Immunologic: negative     Past Medical History:  Past Medical History:   Diagnosis Date   • Allergic rhinitis     on immunotherapy- Dr. Benito    • Asthma     Dr. Benito   • Bruit of left carotid artery 2020   • Carotid stenosis     <50%   • Cold sore    • Gallstone pancreatitis    • GERD (gastroesophageal reflux disease)     Dr. Oliva   • Hiatal hernia     small, sliding    • Hyperlipidemia    • Hypertension    • Left ventricular hypertrophy    • Osteoarthritis     Dr. Avila   • Osteopenia with high risk of fracture    • Pneumonia March, April, May,   • Pre-diabetes    • Vocal cord nodule     Dr. Nieves       Past Surgical History:  Past Surgical History:   Procedure Laterality Date   • APPENDECTOMY     • CHOLECYSTECTOMY  2006    Dr. Hernandez   • ENDOSCOPY  2017    & Dilation. Dr. Oliva   • ERCP  2006    Dr. Oliva   • HYSTERECTOMY  1974    ovaries remain, for benign reasons   • OTHER SURGICAL HISTORY       Stress Echo. No ischemia. EF 60-65%.    • TONSILLECTOMY AND ADENOIDECTOMY     • TOTAL KNEE ARTHROPLASTY  08/31/2017        Family History:  Family History   Problem Relation Age of Onset   • Hypertension Mother    • Hypertension Father    • Heart disease Father    • Prostate cancer Paternal Grandfather         Social History:   Social History     Tobacco Use   • Smoking status: Never     Passive exposure: Never   • Smokeless tobacco: Never   Substance Use Topics   • Alcohol use: No        Allergies:  Allergies   Allergen Reactions   • Aspirin GI Bleeding   • Crestor [Rosuvastatin Calcium] Myalgia   • Albuterol Sulfate Anxiety   • Atorvastatin Calcium Nausea Only   • Sulfa Antibiotics Nausea Only   • Tolterodine Nausea Only     Detrol       Home Meds:  (Not in a hospital admission)      Objective:    Vitals Ranges:   Heart Rate:  [88] 88  Resp:  [14] 14  BP: (154)/(84) 154/84  Body mass index is 25.23 kg/m².     Exam:  General Appearance:  WDWN    HEENT:   without obvious abnormality,  Conjunctiva/corneas clear,  Normal external ear canals, no drainage    Clear orsalmucosa,  Mallampati score 3    Neck:  Supple, symmetrical, trachea midline. No JVD.  Lungs:   Bilateral basal rhonchi bilaterally, respirations unlabored symmetrical wall movement.    Chest wall:  No tenderness or deformity.    Heart:  Regular rate and rhythm, S1 and S2 normal.  Extremities: Trace edema no clubbing or Cyanosis        Data Review:  All labs (24hrs): No results found for this or any previous visit (from the past 24 hour(s)).     Imaging:  CT Chest Without Contrast Diagnostic  Narrative: CT CHEST WO CONTRAST DIAGNOSTIC-     Date of Exam: 10/6/2022 9:44 AM     Indication: Lung nodules, multiple; R93.89-Abnormal findings on  diagnostic imaging of other specified body structures     Comparison: CT chest 5/6/2022, 2/7/2019     Technique: Serial and axial CT images of the chest were obtained.  Reconstructions in the coronal and sagittal planes  were performed.   Automated exposure control and iterative reconstruction methods were  used.     FINDINGS:     Stable noncalcified pulmonary nodules since 2019 include:  Left upper lobe: 3 mm (series 8 image 38)  Left lower lobe: 8 x 6 mm (image 63)     A previously described new described 3 mm nodule within the anterior  medial left upper lobe, has resolved.     A previously described new 11 x 8 mm noncalcified left lower lobe nodule  has diminished in size and density, suggesting improving  infectious/inflammatory etiology. On today's study it has only a  scarlike appearance (series 8 image 63).     A 4 mm noncalcified nodule within the right lower lobe (series 8 image  46), is stable since 5/6/2022.     Chronic cylindrical bronchiectasis is present predominantly in the  bilateral lower lobes, and, to a lesser degree, the medial segment right  middle lobe and lingula. Chronic peripheral alveolar disease is seen  within the left lower lobe, inferior lingula, and medial segment right  middle lobe. There is chronic bandlike scarring within the posterior  medial right lower lobe. The alveolar disease is not thought to be  significantly changed.     The heart size is within normal limits. Trace anterior pericardial fluid  is unchanged from prior examination. Mild coronary artery calcification  is present. Thoracic aortic caliber is normal. There is moderate  eccentric calcific plaquing within the proximal left subclavian artery,  with suspected moderate luminal stenosis. Low-density right thyroid  nodule measuring approximately 9 mm is unchanged.     Small low-density hepatic cysts are unchanged. Cholecystectomy changes  are noted. The remainder of the imaged upper abdominal organs have a  normal noncontrast appearance.     Chronic appearing deformity of the left humeral neck. Degenerative  changes in the spine. No acute or suspicious osseous abnormality.           Impression: 1. The previous study described 2 new  left lower lobe nodules. One of  these has completely resolved. Another nodule has significantly  diminished intensity with now a scarlike appearance, in keeping with a  benign resolving infectious/inflammatory nodule.  2. No new or suspicious pulmonary nodules are identified. Additional  left upper lobe and left lower lobe noncalcified nodules are stable  since 2019, in keeping with benign findings.  3. Chronic cylindrical bronchiectasis and peripheral bronchiolar wall  thickening in the bilateral lower lobes, right middle lobe and lingula.  Chronic peripheral scarring in the lung bases, most notable in the left  lower lobe.  4. No superimposed acute airspace disease is seen.           Electronically Signed By-Rocio Penn MD On:10/6/2022 10:04 AM  This report was finalized on [id number] by  Rocio Penn MD.       ASSESSMENT:  Diagnoses and all orders for this visit:    Mild persistent asthma without complication    Primary insomnia    Gastroesophageal reflux disease without esophagitis    Solitary lung nodule    PLAN:  Bronchodilator inhaled corticosteroid continue on Symbicort    Education how to use inhalers    Encouraged to use incentive spirometer    Continue to exercise slowly as tolerated    Monitor for any change in the color of the sputum    Avoid any exposure to fumes, gas or any irritant    Regard the lung nodule last CAT scan in October resolved the nodule    Regard to her insomnia spoke with her most likely due to her arthritis and she is going to follow-up with the rheumatologist      Follow-up 6 months    Natty Hodge MD. D, ABSM.  2/23/2023  13:09 EST

## 2023-02-23 NOTE — TELEPHONE ENCOUNTER
I am very concerned that her joint pain has gotten worse again.  I am sending prednisone to the pharmacy at a higher dosage for 5 days.      I have lab orders in for her to do before she starts the medication.  I am looking at some different causes with this lab draw, like tick borne illness.      Berkley put her on my schedule for March 1 or the next week to see how she is and go over labs.

## 2023-02-24 LAB
ALBUMIN SERPL ELPH-MCNC: 3.2 G/DL (ref 2.9–4.4)
ALBUMIN/GLOB SERPL: 1.1 {RATIO} (ref 0.7–1.7)
ALPHA1 GLOB SERPL ELPH-MCNC: 0.4 G/DL (ref 0–0.4)
ALPHA2 GLOB SERPL ELPH-MCNC: 1 G/DL (ref 0.4–1)
B-GLOBULIN SERPL ELPH-MCNC: 0.9 G/DL (ref 0.7–1.3)
CRP SERPL-MCNC: 39 MG/L (ref 0–10)
ERYTHROCYTE [SEDIMENTATION RATE] IN BLOOD BY WESTERGREN METHOD: 33 MM/HR (ref 0–40)
GAMMA GLOB SERPL ELPH-MCNC: 0.6 G/DL (ref 0.4–1.8)
GLOBULIN SER CALC-MCNC: 2.9 G/DL (ref 2.2–3.9)
LABORATORY COMMENT REPORT: NORMAL
M PROTEIN SERPL ELPH-MCNC: NORMAL G/DL
PROT SERPL-MCNC: 6.1 G/DL (ref 6–8.5)

## 2023-02-28 LAB
B BURGDOR IGG+IGM SER QL IA: NEGATIVE
E CHAFFEENSIS DNA BLD QL NAA+PROBE: NEGATIVE
R RICKETTSI IGG SER QL IA: NEGATIVE
R RICKETTSI IGM SER-ACNC: 0.65 INDEX (ref 0–0.89)

## 2023-03-01 ENCOUNTER — TELEPHONE (OUTPATIENT)
Dept: FAMILY MEDICINE CLINIC | Facility: CLINIC | Age: 78
End: 2023-03-01

## 2023-03-01 ENCOUNTER — OFFICE VISIT (OUTPATIENT)
Dept: FAMILY MEDICINE CLINIC | Facility: CLINIC | Age: 78
End: 2023-03-01
Payer: MEDICARE

## 2023-03-01 VITALS
HEART RATE: 88 BPM | DIASTOLIC BLOOD PRESSURE: 74 MMHG | WEIGHT: 138 LBS | HEIGHT: 62 IN | OXYGEN SATURATION: 97 % | TEMPERATURE: 97.6 F | SYSTOLIC BLOOD PRESSURE: 144 MMHG | BODY MASS INDEX: 25.4 KG/M2 | RESPIRATION RATE: 16 BRPM

## 2023-03-01 DIAGNOSIS — R79.82 ELEVATED C-REACTIVE PROTEIN: ICD-10-CM

## 2023-03-01 DIAGNOSIS — M15.9 GENERALIZED OSTEOARTHRITIS: Primary | ICD-10-CM

## 2023-03-01 DIAGNOSIS — M25.50 ARTHRALGIA, UNSPECIFIED JOINT: ICD-10-CM

## 2023-03-01 DIAGNOSIS — R79.82 ELEVATED C-REACTIVE PROTEIN: Primary | ICD-10-CM

## 2023-03-01 DIAGNOSIS — M15.9 GENERALIZED OSTEOARTHRITIS: ICD-10-CM

## 2023-03-01 PROCEDURE — 99213 OFFICE O/P EST LOW 20 MIN: CPT | Performed by: NURSE PRACTITIONER

## 2023-03-01 NOTE — TELEPHONE ENCOUNTER
Not able to add on I called the patient to let her know that she needed additional labs done. She will go over and get them drawn

## 2023-03-01 NOTE — TELEPHONE ENCOUNTER
Please call lab and see if they can add the rheumatoid factor TIFFANIE and uric acid to the labs from last week.  They were drawn on the 23rd.  I have entered the orders

## 2023-03-01 NOTE — PROGRESS NOTES
Chief Complaint  Results and Arthritis    Subjective          Cara Castañeda presents to [facility] for joint pain    History of Present Illness    Joint pain: Patient has had dramatically increasing joint pain in hands and other joints that are large like knees shoulders and elbows over the last few months to weeks.  She has had a negative rheumatoid factor.  Her C-reactive protein is elevated.  She had tick panels as well as a protein electrophoresis that was normal.  She called last week requesting something for pain above an NSAID.  She was given a second course of prednisone which helps to resolve her symptoms.  She has never been to rheumatology.    She does follow-up with GI for GERD and with Dr. Hodge or pulmonary.  She has no other concerns or complaints beyond that.    Discussed differential diagnosis today we will order a bone scan and do a referral to rheumatology.    Cara Castañeda  has a past medical history of Allergic rhinitis, Asthma, Bruit of left carotid artery (02/20/2020), Carotid stenosis, Cold sore, Gallstone pancreatitis (2006), GERD (gastroesophageal reflux disease), Hiatal hernia, Hyperlipidemia, Hypertension, Left ventricular hypertrophy, Osteoarthritis, Osteopenia with high risk of fracture, Pneumonia (March, April, May,2022), Pre-diabetes, and Vocal cord nodule.      Review of Systems   Constitutional: Negative for fatigue and fever.   Respiratory: Negative for cough and shortness of breath.    Cardiovascular: Negative for chest pain and palpitations.   Musculoskeletal: Positive for arthralgias and joint swelling.        Objective       Current Outpatient Medications:   •  acetaminophen (TYLENOL) 325 MG tablet, Take  by mouth., Disp: , Rfl:   •  amLODIPine (NORVASC) 5 MG tablet, Take 1 tablet by mouth Daily., Disp: 90 tablet, Rfl: 1  •  budesonide-formoterol (SYMBICORT) 160-4.5 MCG/ACT inhaler, Inhale 2 puffs 2 (Two) Times a Day., Disp: 1 each, Rfl:  "11  •  cetirizine (zyrTEC) 10 MG tablet, Take 1 tablet by mouth Daily., Disp: , Rfl:   •  guaiFENesin (MUCINEX) 600 MG 12 hr tablet, Take 2 tablets by mouth 2 (Two) Times a Day., Disp: , Rfl:   •  hydroCHLOROthiazide (HYDRODIURIL) 25 MG tablet, TAKE ONE TABLET BY MOUTH ONCE DAILY (Patient taking differently: 12.5 mg.), Disp: 90 tablet, Rfl: 1  •  levalbuterol (XOPENEX HFA) 45 MCG/ACT inhaler, XOPENEX HFA 45 MCG/ACT AERO, Disp: , Rfl:   •  losartan (COZAAR) 100 MG tablet, TAKE ONE TABLET BY MOUTH ONCE DAILY, Disp: 90 tablet, Rfl: 1  •  montelukast (SINGULAIR) 10 MG tablet, Take 1 tablet by mouth Daily., Disp: 90 tablet, Rfl: 1  •  Multiple Vitamins-Minerals (CENTRUM SILVER) tablet, CENTRUM SILVER TABS, Disp: , Rfl:   •  pantoprazole (PROTONIX) 40 MG EC tablet, Take 1 tablet by mouth Daily., Disp: , Rfl:   •  risedronate (ACTONEL) 150 MG tablet, Take 1 tablet by mouth Every 30 (Thirty) Days. with water on empty stomach, nothing by mouth or lie down for next 30 minutes., Disp: 3 tablet, Rfl: 1    Vital Signs:      /74   Pulse 88   Temp 97.6 °F (36.4 °C) (Infrared)   Resp 16   Ht 157.5 cm (62\")   Wt 62.6 kg (138 lb)   SpO2 97%   BMI 25.24 kg/m²     Vitals:    03/01/23 0817 03/01/23 0834   BP: 174/74 144/74   BP Location: Right arm    Patient Position: Sitting    Cuff Size: Adult    Pulse: 88    Resp: 16    Temp: 97.6 °F (36.4 °C)    TempSrc: Infrared    SpO2: 97%    Weight: 62.6 kg (138 lb)    Height: 157.5 cm (62\")       Physical Exam  Vitals and nursing note reviewed.   Constitutional:       Appearance: She is well-developed.   Cardiovascular:      Rate and Rhythm: Normal rate and regular rhythm.      Heart sounds: Normal heart sounds. No murmur heard.    No friction rub. No gallop.   Pulmonary:      Effort: Pulmonary effort is normal.      Breath sounds: Normal breath sounds. No wheezing or rales.   Abdominal:      General: Bowel sounds are normal.      Palpations: Abdomen is soft.      Tenderness: There " is no abdominal tenderness.   Musculoskeletal:         General: Deformity present.      Comments: Patient has arthritic deformities of multiple joints in her hands.  No erythema today.  Elbows full range of motion no erythema.  Generalized tenderness in hands.    Ambulating without problem.  Left knee with some joint tenderness and swelling.   Skin:     General: Skin is warm and dry.   Neurological:      Mental Status: She is alert.   Psychiatric:         Mood and Affect: Mood normal.         Behavior: Behavior normal.         Thought Content: Thought content normal.        Result Review :                  PHQ-9 Total Score:             Assessment and Plan    Diagnoses and all orders for this visit:    1. Generalized osteoarthritis (Primary)  -     Rheumatoid Factor  -     TIFFANIE  -     Uric Acid    2. Elevated C-reactive protein  -     Rheumatoid Factor  -     TIFFANIE  -     Uric Acid    3. Arthralgia, unspecified joint  Comments:  Severe         Problem List Items Addressed This Visit        Active Problems    Generalized osteoarthritis - Primary    Relevant Orders    Rheumatoid Factor    TIFFANIE    Uric Acid   Other Visit Diagnoses     Elevated C-reactive protein        Relevant Orders    Rheumatoid Factor    TIFFANIE    Uric Acid    Arthralgia, unspecified joint        Severe          Follow Up   Return for Next scheduled follow up.  Patient was given instructions and counseling regarding her condition or for health maintenance advice. Please see specific information pulled into the AVS if appropriate.

## 2023-03-02 LAB
ANA SER QL: NEGATIVE
RHEUMATOID FACT SERPL-ACNC: <10 IU/ML
URATE SERPL-MCNC: 3 MG/DL (ref 3.1–7.9)

## 2023-03-14 DIAGNOSIS — Z78.0 POST-MENOPAUSAL: Primary | ICD-10-CM

## 2023-03-15 ENCOUNTER — TELEPHONE (OUTPATIENT)
Dept: FAMILY MEDICINE CLINIC | Facility: CLINIC | Age: 78
End: 2023-03-15
Payer: MEDICARE

## 2023-03-15 NOTE — TELEPHONE ENCOUNTER
Caller: Cara Castañeda    Relationship: Self    Best call back number: [phone]    What medication are you requesting: SOMETHING FOR SHOULDER PAIN      Have you had these symptoms before:    [x] Yes  [] No    Have you been treated for these symptoms before:   [x] Yes  [] No    If a prescription is needed, what is your preferred pharmacy and phone number: [facility] - [address] [phone] [place] [phone]      Additional notes:    PATIENT STATES SHE CAN'T SLEEP AND THE MEDICATION THAT WAS PRESCRIBED IS NOT HELPING.

## 2023-03-16 ENCOUNTER — HOSPITAL ENCOUNTER (OUTPATIENT)
Dept: GENERAL RADIOLOGY | Facility: HOSPITAL | Age: 78
Discharge: HOME | End: 2023-03-16
Admitting: NURSE PRACTITIONER
Payer: MEDICARE

## 2023-03-16 ENCOUNTER — OFFICE VISIT (OUTPATIENT)
Dept: FAMILY MEDICINE CLINIC | Facility: CLINIC | Age: 78
End: 2023-03-16
Payer: MEDICARE

## 2023-03-16 VITALS
WEIGHT: 137.8 LBS | DIASTOLIC BLOOD PRESSURE: 78 MMHG | OXYGEN SATURATION: 95 % | HEART RATE: 101 BPM | RESPIRATION RATE: 18 BRPM | BODY MASS INDEX: 25.36 KG/M2 | HEIGHT: 62 IN | TEMPERATURE: 97.2 F | SYSTOLIC BLOOD PRESSURE: 132 MMHG

## 2023-03-16 DIAGNOSIS — M79.641 BILATERAL HAND PAIN: ICD-10-CM

## 2023-03-16 DIAGNOSIS — M79.642 BILATERAL HAND PAIN: ICD-10-CM

## 2023-03-16 DIAGNOSIS — M25.50 MULTIPLE JOINT PAIN: Primary | ICD-10-CM

## 2023-03-16 DIAGNOSIS — M19.90 OSTEOARTHRITIS, UNSPECIFIED OSTEOARTHRITIS TYPE, UNSPECIFIED SITE: ICD-10-CM

## 2023-03-16 PROCEDURE — 1160F RVW MEDS BY RX/DR IN RCRD: CPT | Performed by: NURSE PRACTITIONER

## 2023-03-16 PROCEDURE — 73130 X-RAY EXAM OF HAND: CPT

## 2023-03-16 PROCEDURE — 99214 OFFICE O/P EST MOD 30 MIN: CPT | Performed by: NURSE PRACTITIONER

## 2023-03-16 PROCEDURE — 96372 THER/PROPH/DIAG INJ SC/IM: CPT | Performed by: NURSE PRACTITIONER

## 2023-03-16 PROCEDURE — 3075F SYST BP GE 130 - 139MM HG: CPT | Performed by: NURSE PRACTITIONER

## 2023-03-16 PROCEDURE — 1159F MED LIST DOCD IN RCRD: CPT | Performed by: NURSE PRACTITIONER

## 2023-03-16 PROCEDURE — 3078F DIAST BP <80 MM HG: CPT | Performed by: NURSE PRACTITIONER

## 2023-03-16 RX ORDER — METHYLPREDNISOLONE 4 MG/1
TABLET ORAL
Qty: 21 TABLET | Refills: 0 | Status: SHIPPED | OUTPATIENT
Start: 2023-03-16 | End: 2023-04-04

## 2023-03-16 RX ORDER — CELECOXIB 200 MG/1
200 CAPSULE ORAL DAILY
Qty: 30 CAPSULE | Refills: 1 | Status: SHIPPED | OUTPATIENT
Start: 2023-03-16

## 2023-03-16 RX ORDER — METHYLPREDNISOLONE SODIUM SUCCINATE 125 MG/2ML
125 INJECTION, POWDER, LYOPHILIZED, FOR SOLUTION INTRAMUSCULAR; INTRAVENOUS ONCE
Status: COMPLETED | OUTPATIENT
Start: 2023-03-16 | End: 2023-03-16

## 2023-03-16 RX ADMIN — METHYLPREDNISOLONE SODIUM SUCCINATE 125 MG: 125 INJECTION, POWDER, LYOPHILIZED, FOR SOLUTION INTRAMUSCULAR; INTRAVENOUS at 10:24

## 2023-03-16 NOTE — PROGRESS NOTES
Chief Complaint  Generalized Body Aches (Since January. Not sleeping. Referred to Rheumatology )    Subjective          Cara is a 77 y.o. female  who presents to [facility]     Patient Care Team:  Rocio Apodaca APRN as PCP - General (Nurse Practitioner)  Fernando Oliva MD as Consulting Physician (Gastroenterology)  Marcin Benito MD as Consulting Physician (Allergy)  Natty Hodge MD as Consulting Physician (Pulmonary Disease)     History of Present Illness  Cara is here today due to continued body aches and pain.    Location: aches and pain in both shoulders, left wrist, both elbows, both hands, both knees   Quality: stabbing, aching  Severity: moderate to severe  Duration: for about 2 months  Timing: constant, getting worse  Context:  She can not do her normal activities due to pain.  Alleviating factors: ibuprofen 200 mg 2 tabs TID- QID prn helps some; Tylenol  Aggravating factors: activity, movement  Associated Symptoms: + joint swelling, + joint pain      She saw Rocio Apodaca NP on 1/11/23 and Voltaren gel was prescribed and labs were ordered.   On 1/12/23, she was prescribed prednisone 10 mg weaning dose over 8 days.  She saw Rocio Apodaca NP on  2/7/23 and she was prescribed voltaren 50 mg BID.  She states the voltaren did not help.  On 2/14/23, she was changed to meloxicam 15 mg daily. She states the meloxicam did not help.   On 2/22/23, she was prescribed prednisone 20 mg BID for 5 days and additional labs were ordered.  A referral was sent to rheumatology on 3/1/23  She has an appointment with  Rheumatology on 4/27/23        Cara Castañeda  has a past medical history of Allergic rhinitis, Asthma, Bruit of left carotid artery (02/20/2020), Carotid stenosis, Cold sore, Gallstone pancreatitis (2006), GERD (gastroesophageal reflux disease), Hiatal hernia, Hyperlipidemia, Hypertension, Left ventricular hypertrophy, Osteoarthritis, Osteopenia with high risk of  fracture, Pneumonia (March, April, May,2022), Pre-diabetes, and Vocal cord nodule.      Review of Systems   Constitutional: Negative for fever.   Respiratory: Negative for cough and shortness of breath.    Cardiovascular: Negative for chest pain and leg swelling.   Gastrointestinal: Negative for abdominal pain.   Musculoskeletal: Positive for arthralgias and joint swelling.   Skin: Negative for rash.        Family History   Problem Relation Age of Onset   • Hypertension Mother    • Hypertension Father    • Heart disease Father    • Osteoarthritis Father    • Rheum arthritis Brother    • Prostate cancer Paternal Grandfather         Past Surgical History:   Procedure Laterality Date   • APPENDECTOMY     • CHOLECYSTECTOMY  05/2006    Dr. Hernandez   • ENDOSCOPY  2017    & Dilation. Dr. Oliva   • ERCP  05/2006    Dr. Oliva   • HYSTERECTOMY  1974    ovaries remain, for benign reasons   • OTHER SURGICAL HISTORY  2009    Stress Echo. No ischemia. EF 60-65%.    • TONSILLECTOMY AND ADENOIDECTOMY     • TOTAL KNEE ARTHROPLASTY  08/31/2017        Social History     Socioeconomic History   • Marital status:    Tobacco Use   • Smoking status: Never     Passive exposure: Never   • Smokeless tobacco: Never   Vaping Use   • Vaping Use: Never used   Substance and Sexual Activity   • Alcohol use: No   • Drug use: No   • Sexual activity: Not Currently     Partners: Male        Immunization History   Administered Date(s) Administered   • COVID-19 (MODERNA) 1st, 2nd, 3rd Dose Only 02/02/2021, 03/03/2021, 11/19/2021   • FLUAD TRI 65YR+ 10/17/2019   • Fluad Quad 65+ 10/20/2020   • Fluzone High Dose =>65 Years ([employer] ONLY) 10/19/2012, 11/08/2016, 10/23/2017, 10/17/2018   • Fluzone High-Dose 65+yrs 10/13/2021, 10/04/2022   • Fluzone Quad >6mos (Multi-dose) 11/13/2013   • Influenza TIV (IM) 12/19/2006, 12/28/2007   • Pneumococcal Conjugate 13-Valent (PCV13) 06/05/2015   • Pneumococcal Polysaccharide (PPSV23) 10/01/2003, 11/13/2013  "  • TD Preservative Free 01/14/2016   • Td 12/15/2005, 01/14/2016   • Zostavax 01/23/2015       Objective       Current Outpatient Medications:   •  acetaminophen (TYLENOL) 325 MG tablet, Take  by mouth., Disp: , Rfl:   •  amLODIPine (NORVASC) 5 MG tablet, Take 1 tablet by mouth Daily., Disp: 90 tablet, Rfl: 1  •  budesonide-formoterol (SYMBICORT) 160-4.5 MCG/ACT inhaler, Inhale 2 puffs 2 (Two) Times a Day., Disp: 1 each, Rfl: 11  •  celecoxib (CeleBREX) 200 MG capsule, Take 1 capsule by mouth Daily., Disp: 30 capsule, Rfl: 1  •  cetirizine (zyrTEC) 10 MG tablet, Take 1 tablet by mouth Daily., Disp: , Rfl:   •  guaiFENesin (MUCINEX) 600 MG 12 hr tablet, Take 2 tablets by mouth 2 (Two) Times a Day., Disp: , Rfl:   •  hydroCHLOROthiazide (HYDRODIURIL) 25 MG tablet, TAKE ONE TABLET BY MOUTH ONCE DAILY (Patient taking differently: 12.5 mg.), Disp: 90 tablet, Rfl: 1  •  levalbuterol (XOPENEX HFA) 45 MCG/ACT inhaler, XOPENEX HFA 45 MCG/ACT AERO, Disp: , Rfl:   •  losartan (COZAAR) 100 MG tablet, TAKE ONE TABLET BY MOUTH ONCE DAILY, Disp: 90 tablet, Rfl: 1  •  methylPREDNISolone (MEDROL) 4 MG dose pack, Take as directed on package instructions., Disp: 21 tablet, Rfl: 0  •  montelukast (SINGULAIR) 10 MG tablet, Take 1 tablet by mouth Daily., Disp: 90 tablet, Rfl: 1  •  Multiple Vitamins-Minerals (CENTRUM SILVER) tablet, CENTRUM SILVER TABS, Disp: , Rfl:   •  pantoprazole (PROTONIX) 40 MG EC tablet, Take 1 tablet by mouth Daily., Disp: , Rfl:   •  risedronate (ACTONEL) 150 MG tablet, Take 1 tablet by mouth Every 30 (Thirty) Days. with water on empty stomach, nothing by mouth or lie down for next 30 minutes., Disp: 3 tablet, Rfl: 1    Vital Signs:      /78   Pulse 101   Temp 97.2 °F (36.2 °C) (Infrared)   Resp 18   Ht 157.5 cm (62.01\")   Wt 62.5 kg (137 lb 12.8 oz)   SpO2 95%   BMI 25.20 kg/m²     Vitals:    03/16/23 0923   BP: 132/78   Pulse: 101   Resp: 18   Temp: 97.2 °F (36.2 °C)   TempSrc: Infrared   SpO2: " "95%   Weight: 62.5 kg (137 lb 12.8 oz)   Height: 157.5 cm (62.01\")      Physical Exam  Vitals reviewed.   Constitutional:       General: She is not in acute distress.     Appearance: Normal appearance.   HENT:      Head: Normocephalic and atraumatic.      Right Ear: Tympanic membrane, ear canal and external ear normal.      Left Ear: Tympanic membrane, ear canal and external ear normal.      Mouth/Throat:      Mouth: Mucous membranes are moist.      Pharynx: Oropharynx is clear.   Eyes:      General: No scleral icterus.     Conjunctiva/sclera: Conjunctivae normal.   Cardiovascular:      Rate and Rhythm: Normal rate and regular rhythm.      Heart sounds: Normal heart sounds.   Pulmonary:      Effort: Pulmonary effort is normal. No respiratory distress.      Breath sounds: Normal breath sounds. No wheezing.   Musculoskeletal:      Right hand: Swelling and tenderness present.      Left hand: Swelling and tenderness present.      Cervical back: Neck supple.      Right lower leg: No edema.      Left lower leg: No edema.   Lymphadenopathy:      Cervical: No cervical adenopathy.   Skin:     General: Skin is warm and dry.   Neurological:      Mental Status: She is alert and oriented to person, place, and time.   Psychiatric:         Mood and Affect: Mood normal.        Result Review :              Orders Only on 03/01/2023   Component Date Value Ref Range Status   • RA Latex Turbid 03/01/2023 <10.0  <14.0 IU/mL Final   • Uric Acid 03/01/2023 3.0 (L)  3.1 - 7.9 mg/dL Final               Therapeutic target for gout patients: <6.0   • TIFFANIE Direct 03/01/2023 Negative  Negative Final   Telephone on 02/22/2023   Component Date Value Ref Range Status   • C-Reactive Protein 02/23/2023 39 (H)  0 - 10 mg/L Final   • Sed Rate 02/23/2023 33  0 - 40 mm/hr Final   • Total Protein 02/23/2023 6.1  6.0 - 8.5 g/dL Final   • Albumin 02/23/2023 3.2  2.9 - 4.4 g/dL Final   • Alpha-1-Globulin 02/23/2023 0.4  0.0 - 0.4 g/dL Final   • " Alpha-2-Globulin 02/23/2023 1.0  0.4 - 1.0 g/dL Final   • Beta Globulin 02/23/2023 0.9  0.7 - 1.3 g/dL Final   • Gamma Globulin 02/23/2023 0.6  0.4 - 1.8 g/dL Final   • M-Maciel 02/23/2023 Not Observed  Not Observed g/dL Final   • Globulin 02/23/2023 2.9  2.2 - 3.9 g/dL Final   • A/G Ratio 02/23/2023 1.1  0.7 - 1.7 Final   • Please note 02/23/2023 Comment   Final    Comment: Protein electrophoresis scan will follow via computer, mail, or   delivery.     • Ehrlichia chaffeensis PCR 02/23/2023 Negative  Negative Final    Comment: No Ehrlichia chaffeensis DNA detected.  E. chaffeensis has been  characterized as the causative agent of Human Monocytic Ehrlichiosis  (HME).  This test was developed and its performance characteristics  determined by [employer]. It has not been cleared or approved  by the Food and Drug Administration.     • Lyme Total Antibody EIA 02/23/2023 Negative  Negative Final    Comment: Lyme antibodies not detected. Reflex testing is not indicated.  No laboratory evidence of infection with B. burgdorferi (Lyme disease).  Negative results may occur in patients recently infected (less than  or equal to 14 days) with B. burgdorferi.  If recent infection is  suspected, repeat testing on a new sample collected in 7 to 14 days is  recommended.     • RMSF IgG 02/23/2023 Negative  Negative Final   • RMSF IgM 02/23/2023 0.65  0.00 - 0.89 index Final    Comment:                                  Negative        <0.90                                   Equivocal 0.90 - 1.10                                   Positive        >1.10     Telephone on 01/31/2023   Component Date Value Ref Range Status   • Glucose 02/03/2023 97  70 - 99 mg/dL Final   • BUN 02/03/2023 11  8 - 27 mg/dL Final   • Creatinine 02/03/2023 0.72  0.57 - 1.00 mg/dL Final   • EGFR Result 02/03/2023 85  >59 mL/min/1.73 Final   • BUN/Creatinine Ratio 02/03/2023 15  12 - 28 Final   • Sodium 02/03/2023 138  134 - 144 mmol/L Final   • Potassium  02/03/2023 4.1  3.5 - 5.2 mmol/L Final   • Chloride 02/03/2023 102  96 - 106 mmol/L Final   • Total CO2 02/03/2023 22  20 - 29 mmol/L Final   • Calcium 02/03/2023 10.1  8.7 - 10.3 mg/dL Final   • Total Protein 02/03/2023 6.4  6.0 - 8.5 g/dL Final   • Albumin 02/03/2023 4.2  3.7 - 4.7 g/dL Final   • Globulin 02/03/2023 2.2  1.5 - 4.5 g/dL Final   • A/G Ratio 02/03/2023 1.9  1.2 - 2.2 Final   • Total Bilirubin 02/03/2023 0.2  0.0 - 1.2 mg/dL Final   • Alkaline Phosphatase 02/03/2023 91  44 - 121 IU/L Final   • AST (SGOT) 02/03/2023 15  0 - 40 IU/L Final   • ALT (SGPT) 02/03/2023 12  0 - 32 IU/L Final   • C-Reactive Protein 02/03/2023 20 (H)  0 - 10 mg/L Final   • Sed Rate 02/03/2023 26  0 - 40 mm/hr Final   • Vitamin B-12 02/03/2023 761  232 - 1,245 pg/mL Final   Office Visit on 01/11/2023   Component Date Value Ref Range Status   • WBC 01/11/2023 7.8  3.4 - 10.8 x10E3/uL Final   • RBC 01/11/2023 4.42  3.77 - 5.28 x10E6/uL Final   • Hemoglobin 01/11/2023 12.6  11.1 - 15.9 g/dL Final   • Hematocrit 01/11/2023 37.7  34.0 - 46.6 % Final   • MCV 01/11/2023 85  79 - 97 fL Final   • MCH 01/11/2023 28.5  26.6 - 33.0 pg Final   • MCHC 01/11/2023 33.4  31.5 - 35.7 g/dL Final   • RDW 01/11/2023 12.7  11.7 - 15.4 % Final   • Platelets 01/11/2023 425  150 - 450 x10E3/uL Final   • Neutrophil Rel % 01/11/2023 71  Not Estab. % Final   • Lymphocyte Rel % 01/11/2023 16  Not Estab. % Final   • Monocyte Rel % 01/11/2023 9  Not Estab. % Final   • Eosinophil Rel % 01/11/2023 3  Not Estab. % Final   • Basophil Rel % 01/11/2023 1  Not Estab. % Final   • Neutrophils Absolute 01/11/2023 5.6  1.4 - 7.0 x10E3/uL Final   • Lymphocytes Absolute 01/11/2023 1.2  0.7 - 3.1 x10E3/uL Final   • Monocytes Absolute 01/11/2023 0.7  0.1 - 0.9 x10E3/uL Final   • Eosinophils Absolute 01/11/2023 0.2  0.0 - 0.4 x10E3/uL Final   • Basophils Absolute 01/11/2023 0.1  0.0 - 0.2 x10E3/uL Final   • Immature Granulocyte Rel % 01/11/2023 0  Not Estab. % Final   •  Immature Grans Absolute 01/11/2023 0.0  0.0 - 0.1 x10E3/uL Final   • Glucose 01/11/2023 97  70 - 99 mg/dL Final   • BUN 01/11/2023 12  8 - 27 mg/dL Final   • Creatinine 01/11/2023 0.69  0.57 - 1.00 mg/dL Final   • EGFR Result 01/11/2023 88  >59 mL/min/1.73 Final   • BUN/Creatinine Ratio 01/11/2023 17  12 - 28 Final   • Sodium 01/11/2023 131 (L)  134 - 144 mmol/L Final   • Potassium 01/11/2023 3.6  3.5 - 5.2 mmol/L Final   • Chloride 01/11/2023 93 (L)  96 - 106 mmol/L Final   • Total CO2 01/11/2023 22  20 - 29 mmol/L Final   • Calcium 01/11/2023 10.0  8.7 - 10.3 mg/dL Final   • Total Protein 01/11/2023 7.0  6.0 - 8.5 g/dL Final   • Albumin 01/11/2023 4.4  3.7 - 4.7 g/dL Final   • Globulin 01/11/2023 2.6  1.5 - 4.5 g/dL Final   • A/G Ratio 01/11/2023 1.7  1.2 - 2.2 Final   • Total Bilirubin 01/11/2023 0.2  0.0 - 1.2 mg/dL Final   • Alkaline Phosphatase 01/11/2023 92  44 - 121 IU/L Final   • AST (SGOT) 01/11/2023 17  0 - 40 IU/L Final   • ALT (SGPT) 01/11/2023 13  0 - 32 IU/L Final   • C-Reactive Protein 01/11/2023 12 (H)  0 - 10 mg/L Final   • Sed Rate 01/11/2023 47 (H)  0 - 40 mm/hr Final          Assessment and Plan    Diagnoses and all orders for this visit:    1. Multiple joint pain (Primary)  -     celecoxib (CeleBREX) 200 MG capsule; Take 1 capsule by mouth Daily.  Dispense: 30 capsule; Refill: 1  -     methylPREDNISolone sodium succinate (SOLU-Medrol) injection 125 mg  -     methylPREDNISolone (MEDROL) 4 MG dose pack; Take as directed on package instructions.  Dispense: 21 tablet; Refill: 0    2. Bilateral hand pain  -     XR Hand 3+ View Bilateral    3. Osteoarthritis, unspecified osteoarthritis type, unspecified site    4. BMI 25.0-25.9,adult       Stop ibuprofen.  Begin Celebrex 200 mg daily.  Solu Medrol 125 mg IM and begin oral prednisone tomorrow. Bilateral hand xray ordered. Keep appointment with rheumatology as scheduled.      Follow Up   Return if symptoms worsen or fail to improve.  =Patient was given  instructions and counseling regarding her condition or for health maintenance advice. Please see specific information pulled into the AVS if appropriate.    Patient Instructions   You may take Tylenol 500 mg 2 tablets four times a day as needed for pain.    Stop ibuprofen.

## 2023-03-18 NOTE — PROGRESS NOTES
"Let her know her hand xrays show osteopenia and a lot of osteoarthritis.      There are changes on her xrays that show \"possible chondrocalcinosis suggesting CPPD arthropathy\".... this means possible calcium pyrophosphate deposition (CPPD).... this is calcium pyrophosphate crystals which cause pain and inflammation.      She needs to complete the DEXA scan as ordered by Rocio to evaluate for osteopenia.     Keep appointment with rheumatology as scheduled for further workup to determine if she has CPPD."

## 2023-03-28 ENCOUNTER — TELEPHONE (OUTPATIENT)
Dept: FAMILY MEDICINE CLINIC | Facility: CLINIC | Age: 78
End: 2023-03-28

## 2023-03-28 DIAGNOSIS — I10 BENIGN HYPERTENSION: ICD-10-CM

## 2023-03-28 DIAGNOSIS — E53.8 VITAMIN B12 DEFICIENCY: ICD-10-CM

## 2023-03-28 DIAGNOSIS — R73.03 PREDIABETES: Primary | ICD-10-CM

## 2023-03-28 DIAGNOSIS — E87.1 LOW SODIUM LEVELS: ICD-10-CM

## 2023-03-28 DIAGNOSIS — E78.2 MIXED HYPERLIPIDEMIA: ICD-10-CM

## 2023-03-28 NOTE — TELEPHONE ENCOUNTER
Caller: Cara Castañeda    Relationship: Self    Best call back number: [phone]    What was the call regarding: PLEASE SEND LAB ORDERS AND SET UP APPOINTMENT FOR LABS.    Do you require a callback: YES

## 2023-03-28 NOTE — TELEPHONE ENCOUNTER
Pt states she is coming in for her Medicare wellness apt and was instructed to call and remind the office so her labs for her apt can be put in.  State she will go over to [facility] and have them drawn once she knows orders are in

## 2023-04-01 LAB
ALBUMIN SERPL-MCNC: 4 G/DL (ref 3.7–4.7)
ALBUMIN/GLOB SERPL: 1.8 {RATIO} (ref 1.2–2.2)
ALP SERPL-CCNC: 94 IU/L (ref 44–121)
ALT SERPL-CCNC: 13 IU/L (ref 0–32)
AST SERPL-CCNC: 16 IU/L (ref 0–40)
BILIRUB SERPL-MCNC: 0.3 MG/DL (ref 0–1.2)
BUN SERPL-MCNC: 12 MG/DL (ref 8–27)
BUN/CREAT SERPL: 15 (ref 12–28)
CALCIUM SERPL-MCNC: 9.6 MG/DL (ref 8.7–10.3)
CHLORIDE SERPL-SCNC: 98 MMOL/L (ref 96–106)
CHOLEST SERPL-MCNC: 193 MG/DL (ref 100–199)
CHOLEST/HDLC SERPL: 4.1 RATIO (ref 0–4.4)
CO2 SERPL-SCNC: 21 MMOL/L (ref 20–29)
CREAT SERPL-MCNC: 0.78 MG/DL (ref 0.57–1)
EGFRCR SERPLBLD CKD-EPI 2021: 77 ML/MIN/1.73
GLOBULIN SER CALC-MCNC: 2.2 G/DL (ref 1.5–4.5)
GLUCOSE SERPL-MCNC: 97 MG/DL (ref 70–99)
HBA1C MFR BLD: 6 % (ref 4.8–5.6)
HDLC SERPL-MCNC: 47 MG/DL
LDLC SERPL CALC-MCNC: 117 MG/DL (ref 0–99)
POTASSIUM SERPL-SCNC: 4.5 MMOL/L (ref 3.5–5.2)
PROT SERPL-MCNC: 6.2 G/DL (ref 6–8.5)
RHEUMATOID FACT SERPL-ACNC: <10 IU/ML
SODIUM SERPL-SCNC: 134 MMOL/L (ref 134–144)
TRIGL SERPL-MCNC: 163 MG/DL (ref 0–149)
TSH SERPL DL<=0.005 MIU/L-ACNC: 2.93 UIU/ML (ref 0.45–4.5)
VLDLC SERPL CALC-MCNC: 29 MG/DL (ref 5–40)

## 2023-04-04 ENCOUNTER — OFFICE VISIT (OUTPATIENT)
Dept: FAMILY MEDICINE CLINIC | Facility: CLINIC | Age: 78
End: 2023-04-04
Payer: MEDICARE

## 2023-04-04 VITALS
HEIGHT: 62 IN | OXYGEN SATURATION: 98 % | WEIGHT: 135 LBS | DIASTOLIC BLOOD PRESSURE: 72 MMHG | HEART RATE: 89 BPM | SYSTOLIC BLOOD PRESSURE: 136 MMHG | BODY MASS INDEX: 24.84 KG/M2 | TEMPERATURE: 97.3 F | RESPIRATION RATE: 16 BRPM

## 2023-04-04 DIAGNOSIS — M79.641 BILATERAL HAND PAIN: ICD-10-CM

## 2023-04-04 DIAGNOSIS — I10 BENIGN HYPERTENSION: ICD-10-CM

## 2023-04-04 DIAGNOSIS — Z12.31 SCREENING MAMMOGRAM, ENCOUNTER FOR: ICD-10-CM

## 2023-04-04 DIAGNOSIS — J30.9 ALLERGIC RHINITIS, UNSPECIFIED SEASONALITY, UNSPECIFIED TRIGGER: ICD-10-CM

## 2023-04-04 DIAGNOSIS — J45.30 MILD PERSISTENT ASTHMA WITHOUT COMPLICATION: ICD-10-CM

## 2023-04-04 DIAGNOSIS — Z00.00 MEDICARE ANNUAL WELLNESS VISIT, SUBSEQUENT: Primary | ICD-10-CM

## 2023-04-04 DIAGNOSIS — E53.8 VITAMIN B12 DEFICIENCY: ICD-10-CM

## 2023-04-04 DIAGNOSIS — R73.03 PREDIABETES: ICD-10-CM

## 2023-04-04 DIAGNOSIS — M19.90 OSTEOARTHRITIS, UNSPECIFIED OSTEOARTHRITIS TYPE, UNSPECIFIED SITE: ICD-10-CM

## 2023-04-04 DIAGNOSIS — K21.9 GASTROESOPHAGEAL REFLUX DISEASE WITHOUT ESOPHAGITIS: ICD-10-CM

## 2023-04-04 DIAGNOSIS — E78.2 MIXED HYPERLIPIDEMIA: ICD-10-CM

## 2023-04-04 DIAGNOSIS — M79.642 BILATERAL HAND PAIN: ICD-10-CM

## 2023-04-04 DIAGNOSIS — J47.9 BRONCHIECTASIS WITHOUT COMPLICATION: ICD-10-CM

## 2023-04-04 RX ORDER — HYDROCHLOROTHIAZIDE 12.5 MG/1
12.5 TABLET ORAL DAILY
Qty: 90 TABLET | Refills: 1 | Status: SHIPPED | OUTPATIENT
Start: 2023-04-04

## 2023-04-04 NOTE — ASSESSMENT & PLAN NOTE
Asthma is improving with treatment.  The patient is experiencing no daytime asthma symptoms. She is experiencing no nighttime asthma symptoms.  Continue current medications and follow-up with pulmonary

## 2023-04-04 NOTE — PROGRESS NOTES
The ABCs of the Annual Wellness Visit  Subsequent Medicare Wellness Visit    Subjective    Cara Castañeda is a 77 y.o. female who presents for a Subsequent Medicare Wellness Visit.    The following portions of the patient's history were reviewed and   updated as appropriate: allergies, current medications, past family history, past medical history, past social history, past surgical history and problem list.    Compared to one year ago, the patient feels her physical   health is worse.    Compared to one year ago, the patient feels her mental   health is the same.    Recent Hospitalizations:  She was not admitted to the hospital during the last year.       Current Medical Providers:  Patient Care Team:  Rocio Apodaca APRN as PCP - General (Nurse Practitioner)  Fernando Oliva MD as Consulting Physician (Gastroenterology)  Marcin Benito MD as Consulting Physician (Allergy)  Natty Hodge MD as Consulting Physician (Pulmonary Disease)    Outpatient Medications Prior to Visit   Medication Sig Dispense Refill   • amLODIPine (NORVASC) 5 MG tablet Take 1 tablet by mouth Daily. 90 tablet 1   • budesonide-formoterol (SYMBICORT) 160-4.5 MCG/ACT inhaler Inhale 2 puffs 2 (Two) Times a Day. 1 each 11   • celecoxib (CeleBREX) 200 MG capsule Take 1 capsule by mouth Daily. 30 capsule 1   • cetirizine (zyrTEC) 10 MG tablet Take 1 tablet by mouth Daily.     • levalbuterol (XOPENEX HFA) 45 MCG/ACT inhaler XOPENEX HFA 45 MCG/ACT AERO     • losartan (COZAAR) 100 MG tablet TAKE ONE TABLET BY MOUTH ONCE DAILY 90 tablet 1   • montelukast (SINGULAIR) 10 MG tablet Take 1 tablet by mouth Daily. 90 tablet 1   • risedronate (ACTONEL) 150 MG tablet Take 1 tablet by mouth Every 30 (Thirty) Days. with water on empty stomach, nothing by mouth or lie down for next 30 minutes. 3 tablet 1   • acetaminophen (TYLENOL) 325 MG tablet Take  by mouth.     • guaiFENesin (MUCINEX) 600 MG 12 hr tablet Take 2 tablets by mouth 2 (Two) Times a  Day.     • Multiple Vitamins-Minerals (CENTRUM SILVER) tablet CENTRUM SILVER TABS     • pantoprazole (PROTONIX) 40 MG EC tablet Take 1 tablet by mouth Daily.     • hydroCHLOROthiazide (HYDRODIURIL) 25 MG tablet TAKE ONE TABLET BY MOUTH ONCE DAILY (Patient taking differently: 12.5 mg.) 90 tablet 1   • methylPREDNISolone (MEDROL) 4 MG dose pack Take as directed on package instructions. 21 tablet 0     No facility-administered medications prior to visit.       No opioid medication identified on active medication list. I have reviewed chart for other potential  high risk medication/s and harmful drug interactions in the elderly.          Aspirin is not on active medication list.  Aspirin use is not indicated based on review of current medical condition/s. Risk of harm outweighs potential benefits.  .    Patient Active Problem List   Diagnosis   • Allergic rhinitis   • Asthma   • Benign hypertension   • Gastroesophageal reflux disease   • Generalized osteoarthritis   • Hyperlipidemia   • Hypertensive heart disease   • Prediabetes   • Insomnia   • Other long term (current) drug therapy   • Osteopenia with high risk of fracture   • Mixed stress and urge urinary incontinence   • Vitamin B12 deficiency   • Dry eye syndrome of bilateral lacrimal glands   • Bilateral pseudophakia   • Hypermetropia, bilateral   • Pseudophakia   • Regular astigmatism, left eye   • Presence of intraocular lens   • Epiretinal membrane (ERM) of left eye   • Puckering of macula, bilateral   • Tear film insufficiency   • Bruit of left carotid artery   • Cystocele with rectocele   • Chest pain   • Palpitations   • Bronchiectasis without complication   • BMI 25.0-25.9,adult     Advance Care Planning  Advance Directive is on file.  ACP discussion was held with the patient during this visit. Patient has an advance directive in EMR which is still valid.      Objective    Vitals:    04/04/23 0901 04/04/23 0904 04/04/23 0924   BP: 159/79 150/76 136/72   BP  "Location: Right arm Left arm    Patient Position: Sitting Sitting    Cuff Size: Small Adult Small Adult    Pulse: 89     Resp: 16     Temp: 97.3 °F (36.3 °C)     TempSrc: Infrared     SpO2: 98%     Weight: 61.2 kg (135 lb)     Height: 157.5 cm (62\")     PainSc: 0-No pain       Estimated body mass index is 24.69 kg/m² as calculated from the following:    Height as of this encounter: 157.5 cm (62\").    Weight as of this encounter: 61.2 kg (135 lb).    BMI is >= 25 and <30. (Overweight) The following options were offered after discussion;: exercise counseling/recommendations and nutrition counseling/recommendations      Does the patient have evidence of cognitive impairment? No    Lab Results   Component Value Date    CHLPL 193 03/31/2023    TRIG 163 (H) 03/31/2023    HDL 47 03/31/2023     (H) 03/31/2023    VLDL 29 03/31/2023    HGBA1C 6.0 (H) 03/31/2023        HEALTH RISK ASSESSMENT    Smoking Status:  Social History     Tobacco Use   Smoking Status Never   • Passive exposure: Never   Smokeless Tobacco Never     Alcohol Consumption:  Social History     Substance and Sexual Activity   Alcohol Use No     Fall Risk Screen:    JEANNETTE Fall Risk Assessment was completed, and patient is at LOW risk for falls.Assessment completed on:4/4/2023    Depression Screening:  PHQ-2/PHQ-9 Depression Screening 4/4/2023   Little Interest or Pleasure in Doing Things 0-->not at all   Feeling Down, Depressed or Hopeless 0-->not at all   PHQ-9: Brief Depression Severity Measure Score 0       Health Habits and Functional and Cognitive Screening:  Functional & Cognitive Status 4/4/2023   Do you have difficulty preparing food and eating? No   Do you have difficulty bathing yourself, getting dressed or grooming yourself? No   Do you have difficulty using the toilet? No   Do you have difficulty moving around from place to place? No   Do you have trouble with steps or getting out of a bed or a chair? No   Current Diet Well Balanced Diet "   Dental Exam Up to date   Eye Exam Up to date   Exercise (times per week) 0 times per week   Current Exercises Include No Regular Exercise   Current Exercise Activities Include -   Do you need help using the phone?  No   Are you deaf or do you have serious difficulty hearing?  No   Do you need help with transportation? No   Do you need help shopping? No   Do you need help preparing meals?  No   Do you need help with housework?  No   Do you need help with laundry? No   Do you need help taking your medications? No   Do you need help managing money? No   Do you ever drive or ride in a car without wearing a seat belt? No   Have you felt unusual stress, anger or loneliness in the last month? No   Who do you live with? Spouse   If you need help, do you have trouble finding someone available to you? No   Have you been bothered in the last four weeks by sexual problems? No   Do you have difficulty concentrating, remembering or making decisions? No       Age-appropriate Screening Schedule:  Refer to the list below for future screening recommendations based on patient's age, sex and/or medical conditions. Orders for these recommended tests are listed in the plan section. The patient has been provided with a written plan.    Health Maintenance   Topic Date Due   • ZOSTER VACCINE (2 of 3) 03/20/2015   • HEPATITIS C SCREENING  Never done   • COVID-19 Vaccine (4 - Booster for Moderna series) 01/14/2022   • DXA SCAN  03/15/2023   • INFLUENZA VACCINE  08/01/2023   • LIPID PANEL  03/31/2024   • ANNUAL WELLNESS VISIT  04/04/2024   • COLORECTAL CANCER SCREENING  10/13/2025   • TDAP/TD VACCINES (4 - Tdap) 01/14/2026   • Pneumococcal Vaccine 65+  Completed                CMS Preventative Services Quick Reference  Risk Factors Identified During Encounter  Chronic Pain: Seeing rheumatology upcoming.  currently on Celebrex  Immunizations Discussed/Encouraged: Shingrix  The above risks/problems have been discussed with the  patient.  Pertinent information has been shared with the patient in the After Visit Summary.  An After Visit Summary and PPPS were made available to the patient.    Follow Up:   Next Medicare Wellness visit to be scheduled in 1 year.       Additional E&M Note during same encounter follows:  Patient has multiple medical problems which are significant and separately identifiable that require additional work above and beyond the Medicare Wellness Visit.      Chief Complaint  Medicare Wellness-subsequent, Prediabetes, Hypertension, and Hyperlipidemia    Subjective        HPI  Cara Castañeda is also being seen today for Medicare wellness, prediabetes, hypertension, hyperlipidemia and joint pain    Patient is here today for Medicare wellness.  Also following up on prediabetes.  We reviewed her labs which included an increase in A1c.  She has been on steroids several times throughout the winter secondary to increasing joint pain.  After work-up primarily she has been referred to rheumatology.  She has joint pain that occurs in her hands elbows knees and arms.  She also has a DEXA scan ordered to follow-up on osteo porosis.  She has had x-rays of her hands indicate arthritis.    Patient had discontinuation of Crestor secondary to myalgias.  She is doing better and her lipids are stable without medication.  Patient is very active in volunteering.  She continues to follow-up with pulmonary and is stable.  She has stopped seeing the allergist.        Review of Systems   Constitutional: Negative for fatigue and fever.   HENT: Negative for ear pain, postnasal drip and sinus pressure.    Eyes: Negative for visual disturbance.   Respiratory: Negative for cough and shortness of breath.    Cardiovascular: Negative for chest pain and palpitations.   Gastrointestinal: Negative for abdominal pain, constipation, nausea and vomiting.   Genitourinary: Negative for dysuria.   Musculoskeletal: Positive for arthralgias.   Skin: Negative  "for rash.   Allergic/Immunologic: Negative for environmental allergies.   Neurological: Negative for dizziness.   Hematological: Negative for adenopathy.   Psychiatric/Behavioral: The patient is not nervous/anxious.        Objective   Vital Signs:  /72   Pulse 89   Temp 97.3 °F (36.3 °C) (Infrared)   Resp 16   Ht 157.5 cm (62\")   Wt 61.2 kg (135 lb)   SpO2 98%   BMI 24.69 kg/m²     Physical Exam  Vitals and nursing note reviewed.   Constitutional:       Appearance: Normal appearance. She is well-developed.   HENT:      Head: Normocephalic and atraumatic.      Right Ear: Tympanic membrane, ear canal and external ear normal. No decreased hearing noted. No drainage, swelling or tenderness.      Left Ear: Tympanic membrane, ear canal and external ear normal. No decreased hearing noted. No drainage, swelling or tenderness.      Nose: Nose normal.      Mouth/Throat:      Mouth: Mucous membranes are moist.      Pharynx: Oropharynx is clear.   Eyes:      General: Lids are normal.      Conjunctiva/sclera: Conjunctivae normal.      Pupils: Pupils are equal, round, and reactive to light.   Neck:      Thyroid: No thyroid mass or thyromegaly.      Vascular: Normal carotid pulses. No carotid bruit.   Cardiovascular:      Rate and Rhythm: Regular rhythm.      Heart sounds: S1 normal and S2 normal. No murmur heard.    No friction rub. No gallop.   Pulmonary:      Effort: Pulmonary effort is normal.      Breath sounds: Normal breath sounds. No wheezing.   Chest:      Chest wall: No tenderness.   Abdominal:      General: Bowel sounds are normal. There is no distension.      Palpations: Abdomen is soft.      Tenderness: There is no abdominal tenderness.      Hernia: No hernia is present.   Musculoskeletal:         General: Normal range of motion.      Cervical back: Full passive range of motion without pain, normal range of motion and neck supple.   Lymphadenopathy:      Head:      Right side of head: No submental, " submandibular, tonsillar, preauricular or posterior auricular adenopathy.      Left side of head: No submental, submandibular, tonsillar, preauricular or posterior auricular adenopathy.      Cervical: No cervical adenopathy.      Right cervical: No superficial cervical adenopathy.     Left cervical: No superficial cervical adenopathy.   Skin:     General: Skin is warm and dry.      Comments: Some joint deformity in hands.  No erythema in joints noted today on exam.   Neurological:      General: No focal deficit present.      Mental Status: She is alert and oriented to person, place, and time.      Cranial Nerves: No cranial nerve deficit.      Sensory: No sensory deficit.      Motor: Motor function is intact.      Coordination: Coordination is intact.      Gait: Gait is intact.      Deep Tendon Reflexes: Reflexes normal.   Psychiatric:         Attention and Perception: She is attentive.         Mood and Affect: Mood normal.         Speech: Speech normal.         Behavior: Behavior normal.         Thought Content: Thought content normal.         Cognition and Memory: Cognition normal.         Judgment: Judgment normal.                         Assessment and Plan   Diagnoses and all orders for this visit:    1. Medicare annual wellness visit, subsequent (Primary)  Comments:  Anticipatory guidance was done    2. Screening mammogram, encounter for  -     Cancel: Mammo Screening Bilateral With CAD; Future  -     Mammo Screening Digital Tomosynthesis Bilateral With CAD; Future    3. Allergic rhinitis, unspecified seasonality, unspecified trigger  Assessment & Plan:  Over the counter medication      4. Mild persistent asthma without complication  Assessment & Plan:  Asthma is improving with treatment.  The patient is experiencing no daytime asthma symptoms. She is experiencing no nighttime asthma symptoms.  Continue current medications and follow-up with pulmonary          5. Benign hypertension  Assessment &  Plan:  Hypertension is improving with treatment.  Continue current treatment regimen.  Blood pressure will be reassessed at the next regular appointment.      6. Vitamin B12 deficiency  Assessment & Plan:  Stable      7. Prediabetes  Assessment & Plan:  Continue diet and exercise.  Bring monitor in 6-month      8. Gastroesophageal reflux disease without esophagitis  Assessment & Plan:  Continue current dose.  May need EGD.  Asymptomatic now      9. Bronchiectasis without complication  Assessment & Plan:  On Symbicort following with pulmonary on a regular basis.      10. Osteoarthritis, unspecified osteoarthritis type, unspecified site  Comments:  Has been referred to rheumatology for recurrent joint pain.    11. Bilateral hand pain    12. Mixed hyperlipidemia  Comments:  Continue to monitor.  Unable to tolerate Crestor    Other orders  -     hydroCHLOROthiazide (HYDRODIURIL) 12.5 MG tablet; Take 1 tablet by mouth Daily.  Dispense: 90 tablet; Refill: 1           Follow Up   Return in about 6 months (around 10/4/2023) for Recheck htn and DM.  Patient was given instructions and counseling regarding her condition or for health maintenance advice. Please see specific information pulled into the AVS if appropriate.

## 2023-04-14 ENCOUNTER — HOSPITAL ENCOUNTER (OUTPATIENT)
Dept: BONE DENSITY | Facility: HOSPITAL | Age: 78
Discharge: HOME | End: 2023-04-14
Payer: MEDICARE

## 2023-04-14 ENCOUNTER — HOSPITAL ENCOUNTER (OUTPATIENT)
Dept: MAMMOGRAPHY | Facility: HOSPITAL | Age: 78
Discharge: HOME | End: 2023-04-14
Payer: MEDICARE

## 2023-04-14 DIAGNOSIS — Z12.31 SCREENING MAMMOGRAM, ENCOUNTER FOR: ICD-10-CM

## 2023-04-14 DIAGNOSIS — Z78.0 POST-MENOPAUSAL: ICD-10-CM

## 2023-04-14 PROCEDURE — 77067 SCR MAMMO BI INCL CAD: CPT

## 2023-04-14 PROCEDURE — 77080 DXA BONE DENSITY AXIAL: CPT

## 2023-04-14 PROCEDURE — 77063 BREAST TOMOSYNTHESIS BI: CPT

## 2023-04-19 ENCOUNTER — TELEPHONE (OUTPATIENT)
Dept: FAMILY MEDICINE CLINIC | Facility: CLINIC | Age: 78
End: 2023-04-19

## 2023-04-19 ENCOUNTER — HOSPITAL ENCOUNTER (OUTPATIENT)
Facility: HOSPITAL | Age: 78
Setting detail: OBSERVATION
Discharge: HOME | End: 2023-04-20
Attending: EMERGENCY MEDICINE | Admitting: INTERNAL MEDICINE
Payer: MEDICARE

## 2023-04-19 ENCOUNTER — APPOINTMENT (OUTPATIENT)
Dept: MRI IMAGING | Facility: HOSPITAL | Age: 78
End: 2023-04-19
Payer: MEDICARE

## 2023-04-19 ENCOUNTER — APPOINTMENT (OUTPATIENT)
Dept: CT IMAGING | Facility: HOSPITAL | Age: 78
End: 2023-04-19
Payer: MEDICARE

## 2023-04-19 ENCOUNTER — APPOINTMENT (OUTPATIENT)
Dept: GENERAL RADIOLOGY | Facility: HOSPITAL | Age: 78
End: 2023-04-19
Payer: MEDICARE

## 2023-04-19 DIAGNOSIS — R53.1 RIGHT SIDED WEAKNESS: Primary | ICD-10-CM

## 2023-04-19 DIAGNOSIS — R42 DIZZINESS: ICD-10-CM

## 2023-04-19 LAB
ABO GROUP BLD: NORMAL
ALBUMIN SERPL-MCNC: 3.7 G/DL (ref 3.5–5.2)
ALBUMIN/GLOB SERPL: 1.3 G/DL
ALP SERPL-CCNC: 96 U/L (ref 39–117)
ALT SERPL W P-5'-P-CCNC: 10 U/L (ref 1–33)
ANION GAP SERPL CALCULATED.3IONS-SCNC: 13 MMOL/L (ref 5–15)
APTT PPP: 28.8 SECONDS (ref 61–76.5)
AST SERPL-CCNC: 12 U/L (ref 1–32)
BASOPHILS # BLD AUTO: 0.1 10*3/MM3 (ref 0–0.2)
BASOPHILS NFR BLD AUTO: 0.7 % (ref 0–1.5)
BILIRUB SERPL-MCNC: 0.2 MG/DL (ref 0–1.2)
BLD GP AB SCN SERPL QL: NEGATIVE
BUN SERPL-MCNC: 8 MG/DL (ref 8–23)
BUN/CREAT SERPL: 15.1 (ref 7–25)
CALCIUM SPEC-SCNC: 9.7 MG/DL (ref 8.6–10.5)
CHLORIDE SERPL-SCNC: 93 MMOL/L (ref 98–107)
CO2 SERPL-SCNC: 24 MMOL/L (ref 22–29)
CREAT SERPL-MCNC: 0.53 MG/DL (ref 0.57–1)
DEPRECATED RDW RBC AUTO: 42.4 FL (ref 37–54)
EGFRCR SERPLBLD CKD-EPI 2021: 94.2 ML/MIN/1.73
EOSINOPHIL # BLD AUTO: 0.2 10*3/MM3 (ref 0–0.4)
EOSINOPHIL NFR BLD AUTO: 1.6 % (ref 0.3–6.2)
ERYTHROCYTE [DISTWIDTH] IN BLOOD BY AUTOMATED COUNT: 14.8 % (ref 12.3–15.4)
GEN 5 2HR TROPONIN T REFLEX: 9 NG/L
GLOBULIN UR ELPH-MCNC: 2.8 GM/DL
GLUCOSE BLDC GLUCOMTR-MCNC: 104 MG/DL (ref 70–105)
GLUCOSE BLDC GLUCOMTR-MCNC: 122 MG/DL (ref 70–105)
GLUCOSE BLDC GLUCOMTR-MCNC: 128 MG/DL (ref 70–105)
GLUCOSE SERPL-MCNC: 126 MG/DL (ref 65–99)
HCT VFR BLD AUTO: 35.3 % (ref 34–46.6)
HGB BLD-MCNC: 11.4 G/DL (ref 12–15.9)
HOLD SPECIMEN: NORMAL
INR PPP: 0.96 (ref 0.93–1.1)
LYMPHOCYTES # BLD AUTO: 0.9 10*3/MM3 (ref 0.7–3.1)
LYMPHOCYTES NFR BLD AUTO: 7.5 % (ref 19.6–45.3)
MCH RBC QN AUTO: 26.4 PG (ref 26.6–33)
MCHC RBC AUTO-ENTMCNC: 32.3 G/DL (ref 31.5–35.7)
MCV RBC AUTO: 81.7 FL (ref 79–97)
MONOCYTES # BLD AUTO: 0.8 10*3/MM3 (ref 0.1–0.9)
MONOCYTES NFR BLD AUTO: 7.3 % (ref 5–12)
NEUTROPHILS NFR BLD AUTO: 82.9 % (ref 42.7–76)
NEUTROPHILS NFR BLD AUTO: 9.4 10*3/MM3 (ref 1.7–7)
NRBC BLD AUTO-RTO: 0 /100 WBC (ref 0–0.2)
PLATELET # BLD AUTO: 569 10*3/MM3 (ref 140–450)
PMV BLD AUTO: 6.8 FL (ref 6–12)
POTASSIUM SERPL-SCNC: 3.3 MMOL/L (ref 3.5–5.2)
PROT SERPL-MCNC: 6.5 G/DL (ref 6–8.5)
PROTHROMBIN TIME: 10.3 SECONDS (ref 9.6–11.7)
RBC # BLD AUTO: 4.32 10*6/MM3 (ref 3.77–5.28)
RH BLD: POSITIVE
SODIUM SERPL-SCNC: 130 MMOL/L (ref 136–145)
T&S EXPIRATION DATE: NORMAL
TROPONIN T DELTA: 1 NG/L
TROPONIN T SERPL HS-MCNC: 8 NG/L
WBC NRBC COR # BLD: 11.3 10*3/MM3 (ref 3.4–10.8)

## 2023-04-19 PROCEDURE — 70450 CT HEAD/BRAIN W/O DYE: CPT

## 2023-04-19 PROCEDURE — G0378 HOSPITAL OBSERVATION PER HR: HCPCS

## 2023-04-19 PROCEDURE — 70551 MRI BRAIN STEM W/O DYE: CPT

## 2023-04-19 PROCEDURE — 25510000001 IOPAMIDOL PER 1 ML: Performed by: EMERGENCY MEDICINE

## 2023-04-19 PROCEDURE — 94799 UNLISTED PULMONARY SVC/PX: CPT

## 2023-04-19 PROCEDURE — 99285 EMERGENCY DEPT VISIT HI MDM: CPT

## 2023-04-19 PROCEDURE — 86901 BLOOD TYPING SEROLOGIC RH(D): CPT

## 2023-04-19 PROCEDURE — 70498 CT ANGIOGRAPHY NECK: CPT

## 2023-04-19 PROCEDURE — 85025 COMPLETE CBC W/AUTO DIFF WBC: CPT | Performed by: PHYSICIAN ASSISTANT

## 2023-04-19 PROCEDURE — 71045 X-RAY EXAM CHEST 1 VIEW: CPT

## 2023-04-19 PROCEDURE — 86850 RBC ANTIBODY SCREEN: CPT | Performed by: PHYSICIAN ASSISTANT

## 2023-04-19 PROCEDURE — 94761 N-INVAS EAR/PLS OXIMETRY MLT: CPT

## 2023-04-19 PROCEDURE — 86900 BLOOD TYPING SEROLOGIC ABO: CPT | Performed by: PHYSICIAN ASSISTANT

## 2023-04-19 PROCEDURE — 96372 THER/PROPH/DIAG INJ SC/IM: CPT

## 2023-04-19 PROCEDURE — 25010000002 ENOXAPARIN PER 10 MG: Performed by: NURSE PRACTITIONER

## 2023-04-19 PROCEDURE — 86900 BLOOD TYPING SEROLOGIC ABO: CPT

## 2023-04-19 PROCEDURE — 84484 ASSAY OF TROPONIN QUANT: CPT | Performed by: PHYSICIAN ASSISTANT

## 2023-04-19 PROCEDURE — 82962 GLUCOSE BLOOD TEST: CPT

## 2023-04-19 PROCEDURE — 85610 PROTHROMBIN TIME: CPT | Performed by: PHYSICIAN ASSISTANT

## 2023-04-19 PROCEDURE — 93005 ELECTROCARDIOGRAM TRACING: CPT | Performed by: PHYSICIAN ASSISTANT

## 2023-04-19 PROCEDURE — 80053 COMPREHEN METABOLIC PANEL: CPT | Performed by: PHYSICIAN ASSISTANT

## 2023-04-19 PROCEDURE — 86901 BLOOD TYPING SEROLOGIC RH(D): CPT | Performed by: PHYSICIAN ASSISTANT

## 2023-04-19 PROCEDURE — 70496 CT ANGIOGRAPHY HEAD: CPT

## 2023-04-19 PROCEDURE — 85730 THROMBOPLASTIN TIME PARTIAL: CPT | Performed by: PHYSICIAN ASSISTANT

## 2023-04-19 RX ORDER — POTASSIUM CHLORIDE 1.5 G/1.77G
40 POWDER, FOR SOLUTION ORAL AS NEEDED
Status: DISCONTINUED | OUTPATIENT
Start: 2023-04-19 | End: 2023-04-20 | Stop reason: HOSPADM

## 2023-04-19 RX ORDER — NITROGLYCERIN 0.4 MG/1
0.4 TABLET SUBLINGUAL
Status: DISCONTINUED | OUTPATIENT
Start: 2023-04-19 | End: 2023-04-20 | Stop reason: HOSPADM

## 2023-04-19 RX ORDER — ALBUTEROL SULFATE 2.5 MG/3ML
2.5 SOLUTION RESPIRATORY (INHALATION) EVERY 6 HOURS PRN
Status: DISCONTINUED | OUTPATIENT
Start: 2023-04-19 | End: 2023-04-20 | Stop reason: HOSPADM

## 2023-04-19 RX ORDER — GUAIFENESIN 600 MG/1
600 TABLET, EXTENDED RELEASE ORAL 2 TIMES DAILY
Status: DISCONTINUED | OUTPATIENT
Start: 2023-04-19 | End: 2023-04-20 | Stop reason: HOSPADM

## 2023-04-19 RX ORDER — ACETAMINOPHEN 325 MG/1
650 TABLET ORAL EVERY 6 HOURS PRN
Status: DISCONTINUED | OUTPATIENT
Start: 2023-04-19 | End: 2023-04-20 | Stop reason: HOSPADM

## 2023-04-19 RX ORDER — BUDESONIDE AND FORMOTEROL FUMARATE DIHYDRATE 160; 4.5 UG/1; UG/1
2 AEROSOL RESPIRATORY (INHALATION)
Status: DISCONTINUED | OUTPATIENT
Start: 2023-04-19 | End: 2023-04-20 | Stop reason: HOSPADM

## 2023-04-19 RX ORDER — IBUPROFEN 200 MG
200 TABLET ORAL 3 TIMES DAILY PRN
COMMUNITY

## 2023-04-19 RX ORDER — SODIUM CHLORIDE 0.9 % (FLUSH) 0.9 %
10 SYRINGE (ML) INJECTION AS NEEDED
Status: DISCONTINUED | OUTPATIENT
Start: 2023-04-19 | End: 2023-04-20 | Stop reason: HOSPADM

## 2023-04-19 RX ORDER — LOSARTAN POTASSIUM 50 MG/1
100 TABLET ORAL DAILY
Status: DISCONTINUED | OUTPATIENT
Start: 2023-04-19 | End: 2023-04-20

## 2023-04-19 RX ORDER — POTASSIUM CHLORIDE 20 MEQ/1
40 TABLET, EXTENDED RELEASE ORAL ONCE
Status: COMPLETED | OUTPATIENT
Start: 2023-04-19 | End: 2023-04-19

## 2023-04-19 RX ORDER — AMLODIPINE BESYLATE 5 MG/1
5 TABLET ORAL DAILY
Status: DISCONTINUED | OUTPATIENT
Start: 2023-04-19 | End: 2023-04-20

## 2023-04-19 RX ORDER — ENOXAPARIN SODIUM 100 MG/ML
40 INJECTION SUBCUTANEOUS EVERY 24 HOURS
Status: DISCONTINUED | OUTPATIENT
Start: 2023-04-19 | End: 2023-04-20 | Stop reason: HOSPADM

## 2023-04-19 RX ORDER — ONDANSETRON 2 MG/ML
4 INJECTION INTRAMUSCULAR; INTRAVENOUS EVERY 6 HOURS PRN
Status: DISCONTINUED | OUTPATIENT
Start: 2023-04-19 | End: 2023-04-20 | Stop reason: HOSPADM

## 2023-04-19 RX ORDER — POTASSIUM CHLORIDE 7.45 MG/ML
10 INJECTION INTRAVENOUS
Status: DISCONTINUED | OUTPATIENT
Start: 2023-04-19 | End: 2023-04-20 | Stop reason: HOSPADM

## 2023-04-19 RX ORDER — PANTOPRAZOLE SODIUM 40 MG/1
40 TABLET, DELAYED RELEASE ORAL DAILY
Status: DISCONTINUED | OUTPATIENT
Start: 2023-04-19 | End: 2023-04-20 | Stop reason: HOSPADM

## 2023-04-19 RX ORDER — CELECOXIB 200 MG/1
200 CAPSULE ORAL DAILY
Status: DISCONTINUED | OUTPATIENT
Start: 2023-04-19 | End: 2023-04-20 | Stop reason: HOSPADM

## 2023-04-19 RX ORDER — AMLODIPINE BESYLATE 5 MG/1
TABLET ORAL
Qty: 90 TABLET | Refills: 1 | Status: SHIPPED | OUTPATIENT
Start: 2023-04-19 | End: 2023-04-19

## 2023-04-19 RX ORDER — CETIRIZINE HYDROCHLORIDE 10 MG/1
10 TABLET ORAL DAILY
Status: DISCONTINUED | OUTPATIENT
Start: 2023-04-19 | End: 2023-04-20 | Stop reason: HOSPADM

## 2023-04-19 RX ORDER — SODIUM CHLORIDE 0.9 % (FLUSH) 0.9 %
10 SYRINGE (ML) INJECTION EVERY 12 HOURS SCHEDULED
Status: DISCONTINUED | OUTPATIENT
Start: 2023-04-19 | End: 2023-04-20 | Stop reason: HOSPADM

## 2023-04-19 RX ORDER — POTASSIUM CHLORIDE 20 MEQ/1
40 TABLET, EXTENDED RELEASE ORAL AS NEEDED
Status: DISCONTINUED | OUTPATIENT
Start: 2023-04-19 | End: 2023-04-20 | Stop reason: HOSPADM

## 2023-04-19 RX ORDER — HYDROCHLOROTHIAZIDE 12.5 MG/1
12.5 TABLET ORAL DAILY
Status: DISCONTINUED | OUTPATIENT
Start: 2023-04-19 | End: 2023-04-20 | Stop reason: HOSPADM

## 2023-04-19 RX ORDER — CLOPIDOGREL BISULFATE 75 MG/1
75 TABLET ORAL DAILY
Status: DISCONTINUED | OUTPATIENT
Start: 2023-04-19 | End: 2023-04-20

## 2023-04-19 RX ORDER — LOSARTAN POTASSIUM 100 MG/1
100 TABLET ORAL DAILY
COMMUNITY

## 2023-04-19 RX ORDER — SODIUM CHLORIDE 9 MG/ML
40 INJECTION, SOLUTION INTRAVENOUS AS NEEDED
Status: DISCONTINUED | OUTPATIENT
Start: 2023-04-19 | End: 2023-04-20 | Stop reason: HOSPADM

## 2023-04-19 RX ORDER — IBUPROFEN 400 MG/1
200 TABLET ORAL 3 TIMES DAILY PRN
Status: DISCONTINUED | OUTPATIENT
Start: 2023-04-19 | End: 2023-04-20 | Stop reason: HOSPADM

## 2023-04-19 RX ORDER — MONTELUKAST SODIUM 10 MG/1
10 TABLET ORAL NIGHTLY
Status: DISCONTINUED | OUTPATIENT
Start: 2023-04-19 | End: 2023-04-20 | Stop reason: HOSPADM

## 2023-04-19 RX ORDER — AMLODIPINE BESYLATE 5 MG/1
5 TABLET ORAL DAILY
COMMUNITY
End: 2023-04-26 | Stop reason: SDUPTHER

## 2023-04-19 RX ADMIN — GUAIFENESIN 600 MG: 600 TABLET, EXTENDED RELEASE ORAL at 20:16

## 2023-04-19 RX ADMIN — Medication 10 ML: at 20:19

## 2023-04-19 RX ADMIN — LOSARTAN POTASSIUM 100 MG: 50 TABLET, FILM COATED ORAL at 20:18

## 2023-04-19 RX ADMIN — CELECOXIB 200 MG: 200 CAPSULE ORAL at 16:40

## 2023-04-19 RX ADMIN — AMLODIPINE BESYLATE 5 MG: 5 TABLET ORAL at 16:40

## 2023-04-19 RX ADMIN — CLOPIDOGREL BISULFATE 75 MG: 75 TABLET ORAL at 16:40

## 2023-04-19 RX ADMIN — POTASSIUM CHLORIDE 40 MEQ: 1500 TABLET, EXTENDED RELEASE ORAL at 13:52

## 2023-04-19 RX ADMIN — IOPAMIDOL 100 ML: 755 INJECTION, SOLUTION INTRAVENOUS at 11:58

## 2023-04-19 RX ADMIN — MONTELUKAST 10 MG: 10 TABLET, FILM COATED ORAL at 20:16

## 2023-04-19 RX ADMIN — ENOXAPARIN SODIUM 40 MG: 100 INJECTION SUBCUTANEOUS at 16:40

## 2023-04-19 RX ADMIN — Medication 10 ML: at 16:40

## 2023-04-19 RX ADMIN — BUDESONIDE AND FORMOTEROL FUMARATE DIHYDRATE 2 PUFF: 160; 4.5 AEROSOL RESPIRATORY (INHALATION) at 19:58

## 2023-04-19 NOTE — H&P
[facility] Medicine Services  History & Physical    Patient Name: Cara Castañeda  : 1945  MRN: [id number]  Primary Care Physician:  Rocio Apodaca APRN  Date of admission: 2023  Date and Time of Service: 2023 at 14:22 EDT    Subjective      Chief Complaint: Right sided weakness, dizziness, and fall    History of Present Illness: Cara Castañeda is a 77 y.o. female who presented to [facility] on 2023 complaining of intermittent right arm and leg weakness and numbness since 7:30 AM yesterday morning.  Patient states she felt normal when she went to bed around 10:30 PM last night but woke up with the symptoms this morning.  She denies any slurred speech facial droop or visual disturbances.  No chest pain or shortness of breath.  No history of stroke in the past.  She is on no blood thinners.  She does report complaints of dizziness as well.     CT Head Without Contrast    Result Date: 2023  Impression: 1. No acute intracranial finding. 2. Mild atrophy. Electronically Signed: Rocio Penn  2023 12:01 PM EDT  Workstation ID: [id number]    CT Angiogram Neck    Result Date: 2023  Impression: 1. Mild plaquing in the carotid bulbs bilaterally but no hemodynamically significant stenosis. 2. Moderate stenosis of the left vertebral artery shortly after the origin of the left PICA. 3. The right CARMENCITA is extremely small in caliber throughout its course and may occlude distally. This may be congenital, given that there is no evidence of infarct. Electronically Signed: Berny Choi  2023 12:16 PM EDT  Workstation ID: [id number]    XR Chest 1 View    Result Date: 2023  Impression: Bilateral basilar atelectasis left greater than right. Electronically Signed: Gabriel Duffy  2023 10:45 AM EDT  Workstation ID: [id number]    CT Angiogram Head w AI Analysis of LVO    Result Date: 2023  Impression: 1. Mild plaquing in the carotid bulbs bilaterally but no  "hemodynamically significant stenosis. 2. Moderate stenosis of the left vertebral artery shortly after the origin of the left PICA. 3. The right CARMENCITA is extremely small in caliber throughout its course and may occlude distally. This may be congenital, given that there is no evidence of infarct. Electronically Signed: Berny Choi  4/19/2023 12:16 PM EDT  Workstation ID: [id number]    ECG 12 Lead Stroke Evaluation   Preliminary Result   HEART RATE= 88  bpm   RR Interval= 680  ms   UT Interval= 156  ms   P Horizontal Axis= -1  deg   P Front Axis= 30  deg   QRSD Interval= 99  ms   QT Interval= 367  ms   QRS Axis= -11  deg   T Wave Axis= 46  deg   - NORMAL ECG -   Sinus rhythm   Electronically Signed By:    Date and Time of Study: 2023-04-19 10:18:25              Review of Systems   Musculoskeletal: Positive for arthritis.        Recently had a steroid regimen outpatient.  Will be seeing a rheumatologist for the first time at the end of this month.  Does not have an official diagnosis of RA.  Patient denies any known back/spinal issues.   Neurological: Positive for dizziness, loss of balance and numbness.        Right side feels this \"some better \"since arriving.  When patient was stood at bedside she stated she felt \"pretty good \"but at the same time odd.  She said it was hard to describe.   All other systems reviewed and are negative.       Personal History     Past Medical History:   Diagnosis Date   • Allergic rhinitis     on immunotherapy- Dr. Benito    • Asthma     Dr. Benito   • Bruit of left carotid artery 02/20/2020   • Carotid stenosis     <50%   • Cold sore    • Gallstone pancreatitis 2006   • GERD (gastroesophageal reflux disease)     Dr. Oliva   • Hiatal hernia     small, sliding    • Hyperlipidemia    • Hypertension    • Left ventricular hypertrophy    • Osteoarthritis     Dr. Avila   • Osteopenia with high risk of fracture    • Pneumonia March, April, May,2022   • Pre-diabetes    • Vocal cord nodule     Dr." Lizbeth       Past Surgical History:   Procedure Laterality Date   • APPENDECTOMY     • CHOLECYSTECTOMY  05/2006    Dr. Hernandez   • ENDOSCOPY  2017    & Dilation. Dr. Oliva   • ERCP  05/2006    Dr. Oliva   • HYSTERECTOMY  1974    ovaries remain, for benign reasons   • OTHER SURGICAL HISTORY  2009    Stress Echo. No ischemia. EF 60-65%.    • TONSILLECTOMY AND ADENOIDECTOMY     • TOTAL KNEE ARTHROPLASTY  08/31/2017       Family History: family history includes Heart disease in her father; Hypertension in her father and mother; Osteoarthritis in her father; Prostate cancer in her paternal grandfather; Rheum arthritis in her brother. Otherwise pertinent FHx was reviewed and not pertinent to current issue.    Social History:  reports that she has never smoked. She has never been exposed to tobacco smoke. She has never used smokeless tobacco. She reports that she does not drink alcohol and does not use drugs.    Home Medications:  Prior to Admission Medications     Prescriptions Last Dose Informant Patient Reported? Taking?    acetaminophen (TYLENOL) 325 MG tablet   Yes Yes    Take 2 tablets by mouth Every 6 (Six) Hours As Needed.    budesonide-formoterol (SYMBICORT) 160-4.5 MCG/ACT inhaler   No No    Inhale 2 puffs 2 (Two) Times a Day.    celecoxib (CeleBREX) 200 MG capsule   No No    Take 1 capsule by mouth Daily.    cetirizine (zyrTEC) 10 MG tablet   Yes No    Take 1 tablet by mouth Daily.    guaiFENesin (MUCINEX) 600 MG 12 hr tablet  Self Yes No    Take 2 tablets by mouth 2 (Two) Times a Day.    hydroCHLOROthiazide (HYDRODIURIL) 12.5 MG tablet   No No    Take 1 tablet by mouth Daily.    levalbuterol (XOPENEX HFA) 45 MCG/ACT inhaler   Yes No    XOPENEX HFA 45 MCG/ACT AERO    losartan (COZAAR) 100 MG tablet   No No    TAKE ONE TABLET BY MOUTH ONCE DAILY    montelukast (SINGULAIR) 10 MG tablet   No No    Take 1 tablet by mouth Daily.    Multiple Vitamins-Minerals (CENTRUM SILVER) tablet   Yes No    CENTRUM SILVER  TABS    pantoprazole (PROTONIX) 40 MG EC tablet   Yes No    Take 1 tablet by mouth Daily.    risedronate (ACTONEL) 150 MG tablet   No No    Take 1 tablet by mouth Every 30 (Thirty) Days. with water on empty stomach, nothing by mouth or lie down for next 30 minutes.            Allergies:  Allergies   Allergen Reactions   • Aspirin GI Bleeding   • Crestor [Rosuvastatin Calcium] Myalgia   • Albuterol Sulfate Anxiety   • Atorvastatin Calcium Nausea Only   • Sulfa Antibiotics Nausea Only   • Tolterodine Nausea Only     Detrol       Objective      Vitals:   Temp:  [98.1 °F (36.7 °C)] 98.1 °F (36.7 °C)  Heart Rate:  [90-94] 90  Resp:  [16-18] 16  BP: (144-161)/(59-73) 144/61    Physical Exam  Vitals reviewed.   Constitutional:       Appearance: Normal appearance.   HENT:      Head: Normocephalic.      Mouth/Throat:      Mouth: Mucous membranes are moist.   Eyes:      Extraocular Movements: Extraocular movements intact.      Pupils: Pupils are equal, round, and reactive to light.   Cardiovascular:      Rate and Rhythm: Normal rate and regular rhythm.      Pulses: Normal pulses.      Heart sounds: Normal heart sounds.   Pulmonary:      Effort: Pulmonary effort is normal.      Breath sounds: Normal breath sounds.   Abdominal:      General: Bowel sounds are normal.      Palpations: Abdomen is soft.   Musculoskeletal:         General: Normal range of motion.   Skin:     General: Skin is warm and dry.   Neurological:      General: No focal deficit present.      Mental Status: She is alert and oriented to person, place, and time.      Comments: Equal hand grasps, no drift, and normal gait.  Patient was stood at bedside, she took a few steps with me at her side.  No abnormality noted.  But patient reports that she still fears feels odd in her right lower extremity.   Psychiatric:         Mood and Affect: Mood normal.         Behavior: Behavior normal.          Result Review    Result Review:  I have personally reviewed the results  from the time of this admission to 4/19/2023 14:14 EDT and agree with these findings:  [x]  Laboratory  [x]  Microbiology  [x]  Radiology  [x]  EKG/Telemetry   []  Cardiology/Vascular   []  Pathology  []  Old records  []  Other:      Assessment & Plan        Active Hospital Problems:  Active Hospital Problems    Diagnosis    • **Right sided weakness      Plan:     Right sided weakness  Dizziness  Fall  - CT head and CTA head/neck essentially unremarkable  - NIH 0  - EKG: NSR  - Glucose 122  - Neurology consulted  - MRI brain w/o ordered  - Neuro checks  - Fall precautions  - Echo ordered  - PT consulted   - Allergy to ASA and statins  - Lovenox ordered    Hypokalemia  - K 3.3  - K-dur given in ED  - Potassium replacement protocol ordered    HTN  HLD  - /61  - Lipid panel normal in March  - Resume amlodipine, hydrochlorothiazide and losartan  - Monitor BP routinely    Osteoarthritis  - Sees a rheumatologist for the first time on the 27th. Never been diagnosed with RA.  -Resume ibuprofen and Celebrex    GERD  -Resume Protonix    DVT prophylaxis:  Medical and mechanical DVT prophylaxis orders are present.     Pre-Diabetic: AIC 6.0 in March    CODE STATUS:  Level Of Support Discussed With: Patient  Code Status (Patient has no pulse and is not breathing): CPR (Attempt to Resuscitate)  Medical Interventions (Patient has pulse or is breathing): Full Support         Admission Status:  I believe this patient meets observation status.    I discussed the patient's findings and my recommendations with patient and family.      Signature: Electronically signed by AGNES Shah, 04/19/23, 14:14 EDT.  [facility], [place], operated by [facility] Hospitalist Team

## 2023-04-19 NOTE — ED PROVIDER NOTES
Subjective    Provider in Triage Note  Patient is a 77-year-old female presents to the ED with complaints of intermittent right arm and leg weakness and numbness since 7:30 AM yesterday morning.  Patient states she felt normal when she went to bed around 10:30 PM last night but woke up with the symptoms this morning.  She denies any slurred speech facial droop or visual disturbances.  No chest pain or shortness of breath.  No history of stroke in the past.  She is on no blood thinners.  She does report complaints of dizziness as well.       History of Present Illness  This is a 77-year-old female with past medical history of hyperlipidemia, hypertension, rheumatoid arthritis, and carotid stenosis who presents to the emergency department for concerns of right-sided weakness since yesterday.  The patient reports about 3 episodes of this right-sided weakness.  She states that yesterday she was walking and felt her leg dragging on her right side.  She also reports that her right hand has a numbness sensation as well.  She did not fall yesterday.  She does state that that numbness has resolved and then reoccurred again this morning.  She states that since this morning her numbness sensation has been constant. She does not have any new or acute pain.  She does report episodes of dizziness but no headaches.  The patient admits to weakness but denies any nausea, vomiting, vision changes, recent injuries or traumas, fever, chills.        Review of Systems   Constitutional: Negative for chills and fever.   Eyes: Negative for visual disturbance.   Respiratory: Negative for shortness of breath.    Cardiovascular: Negative for chest pain.   Gastrointestinal: Negative for nausea and vomiting.   Genitourinary: Negative for difficulty urinating.   Neurological: Positive for dizziness, weakness and numbness. Negative for facial asymmetry and headaches.   Psychiatric/Behavioral: Negative for confusion.       Past Medical History:    Diagnosis Date   • Allergic rhinitis     on immunotherapy- Dr. Benito    • Asthma     Dr. Benito   • Bruit of left carotid artery 02/20/2020   • Carotid stenosis     <50%   • Cold sore    • Gallstone pancreatitis 2006   • GERD (gastroesophageal reflux disease)     Dr. Oliva   • Hiatal hernia     small, sliding    • Hyperlipidemia    • Hypertension    • Left ventricular hypertrophy    • Osteoarthritis     Dr. Avila   • Osteopenia with high risk of fracture    • Pneumonia March, April, May,2022   • Pre-diabetes    • Vocal cord nodule     Dr. Nieves       Allergies   Allergen Reactions   • Aspirin GI Bleeding   • Crestor [Rosuvastatin Calcium] Myalgia   • Albuterol Sulfate Anxiety   • Atorvastatin Calcium Nausea Only   • Sulfa Antibiotics Nausea Only   • Tolterodine Nausea Only     Detrol       Past Surgical History:   Procedure Laterality Date   • APPENDECTOMY     • CHOLECYSTECTOMY  05/2006    Dr. Hernandez   • ENDOSCOPY  2017    & Dilation. Dr. Oliva   • ERCP  05/2006    Dr. Oliva   • HYSTERECTOMY  1974    ovaries remain, for benign reasons   • OTHER SURGICAL HISTORY  2009    Stress Echo. No ischemia. EF 60-65%.    • TONSILLECTOMY AND ADENOIDECTOMY     • TOTAL KNEE ARTHROPLASTY  08/31/2017       Family History   Problem Relation Age of Onset   • Hypertension Mother    • Hypertension Father    • Heart disease Father    • Osteoarthritis Father    • Rheum arthritis Brother    • Prostate cancer Paternal Grandfather    • Breast cancer Neg Hx    • Ovarian cancer Neg Hx        Social History     Socioeconomic History   • Marital status:    Tobacco Use   • Smoking status: Never     Passive exposure: Never   • Smokeless tobacco: Never   Vaping Use   • Vaping Use: Never used   Substance and Sexual Activity   • Alcohol use: No   • Drug use: No   • Sexual activity: Not Currently     Partners: Male           Objective   Physical Exam  Vital signs and triage nurse note reviewed.  Constitutional: Awake, alert;  well-developed and well-nourished. No acute distress is noted.  HEENT: Normocephalic, atraumatic; pupils are PERRL with intact EOM; oropharynx is pink and moist without exudate or erythema.  No drooling or pooling of oral secretions.  Neck: Supple, full range of motion without pain; no cervical lymphadenopathy. Normal phonation.  Cardiovascular: Regular rate and rhythm, normal S1-S2.  No murmur noted.  Pulmonary: Respiratory effort regular nonlabored, breath sounds clear to auscultation all fields.  Abdomen: Soft, nontender, nondistended with normoactive bowel sounds; no rebound or guarding.  Musculoskeletal: Independent range of motion of all extremities with no palpable tenderness or edema.  Neuro: Alert oriented x3, speech is clear and appropriate, GCS 15. Slight decrease in sensation in the right foot.  No facial asymmetry. Muscle strength 5/5 bilateral. Cranial nerves 2-12 grossly intact. Normal coordination.  No gaze, deviation, or nystagmus.  Follows commands.  Skin: Flesh tone, warm, dry, intact; no erythematous or petechial rash or lesion.    Procedures           ED Course  ED Course as of 04/19/23 1359   Wed Apr 19, 2023   1317 XR Chest 1 View [MD]      ED Course User Index  [MD] Angie Arana APRN      Labs Reviewed   COMPREHENSIVE METABOLIC PANEL - Abnormal; Notable for the following components:       Result Value    Glucose 126 (*)     Creatinine 0.53 (*)     Sodium 130 (*)     Potassium 3.3 (*)     Chloride 93 (*)     All other components within normal limits    Narrative:     GFR Normal >60  Chronic Kidney Disease <60  Kidney Failure <15    The GFR formula is only valid for adults with stable renal function between ages 18 and 70.   APTT - Abnormal; Notable for the following components:    PTT 28.8 (*)     All other components within normal limits   CBC WITH AUTO DIFFERENTIAL - Abnormal; Notable for the following components:    WBC 11.30 (*)     Hemoglobin 11.4 (*)     MCH 26.4 (*)     Platelets 569  (*)     Neutrophil % 82.9 (*)     Lymphocyte % 7.5 (*)     Neutrophils, Absolute 9.40 (*)     All other components within normal limits   POCT GLUCOSE FINGERSTICK - Abnormal; Notable for the following components:    Glucose 122 (*)     All other components within normal limits   PROTIME-INR - Normal   TROPONIN - Normal    Narrative:     High Sensitive Troponin T Reference Range:  <10.0 ng/L- Negative Female for AMI  <15.0 ng/L- Negative Male for AMI  >=10 - Abnormal Female indicating possible myocardial injury.  >=15 - Abnormal Male indicating possible myocardial injury.   Clinicians would have to utilize clinical acumen, EKG, Troponin, and serial changes to determine if it is an Acute Myocardial Infarction or myocardial injury due to an underlying chronic condition.        HIGH SENSITIVITIY TROPONIN T 2HR - Normal    Narrative:     High Sensitive Troponin T Reference Range:  <10.0 ng/L- Negative Female for AMI  <15.0 ng/L- Negative Male for AMI  >=10 - Abnormal Female indicating possible myocardial injury.  >=15 - Abnormal Male indicating possible myocardial injury.   Clinicians would have to utilize clinical acumen, EKG, Troponin, and serial changes to determine if it is an Acute Myocardial Infarction or myocardial injury due to an underlying chronic condition.        POCT GLUCOSE FINGERSTICK   TYPE AND SCREEN   BB ARMBAND CHECK   CBC AND DIFFERENTIAL    Narrative:     The following orders were created for panel order CBC & Differential.  Procedure                               Abnormality         Status                     ---------                               -----------         ------                     CBC Auto Differential[[id number]]        Abnormal            Final result                 Please view results for these tests on the individual orders.   EXTRA TUBES    Narrative:     The following orders were created for panel order Extra Tubes.  Procedure                               Abnormality          Status                     ---------                               -----------         ------                     [facility][[id number]]                                   Final result                 Please view results for these tests on the individual orders.   [facility]     CT Head Without Contrast    Result Date: 4/19/2023  Impression: 1. No acute intracranial finding. 2. Mild atrophy. Electronically Signed: Rociocr Penn  4/19/2023 12:01 PM EDT  Workstation ID: [id number]    CT Angiogram Neck    Result Date: 4/19/2023  Impression: 1. Mild plaquing in the carotid bulbs bilaterally but no hemodynamically significant stenosis. 2. Moderate stenosis of the left vertebral artery shortly after the origin of the left PICA. 3. The right CARMENCITA is extremely small in caliber throughout its course and may occlude distally. This may be congenital, given that there is no evidence of infarct. Electronically Signed: Berny Choi  4/19/2023 12:16 PM EDT  Workstation ID: [id number]    XR Chest 1 View    Result Date: 4/19/2023  Impression: Bilateral basilar atelectasis left greater than right. Electronically Signed: Gabriel Duffy  4/19/2023 10:45 AM EDT  Workstation ID: [id number]    CT Angiogram Head w AI Analysis of LVO    Result Date: 4/19/2023  Impression: 1. Mild plaquing in the carotid bulbs bilaterally but no hemodynamically significant stenosis. 2. Moderate stenosis of the left vertebral artery shortly after the origin of the left PICA. 3. The right CARMENCITA is extremely small in caliber throughout its course and may occlude distally. This may be congenital, given that there is no evidence of infarct. Electronically Signed: Berny Choi  4/19/2023 12:16 PM EDT  Workstation ID: [id number]    Medications   sodium chloride 0.9 % flush 10 mL (has no administration in time range)   iopamidol (ISOVUE-370) 76 % injection 100 mL (100 mL Intravenous Given 4/19/23 1158)   potassium chloride (K-DUR,KLOR-CON) CR tablet 40 mEq (40 mEq Oral  Given 4/19/23 1352)       ABCD2 Score: 6                                  Medical Decision Making  Patient presents from home with complaints of right arm and leg weakness with decree sensation and some numbness in the right foot.  2 episodes since yesterday.  Some dizziness.  No headache.  No speech difficulties.  No vision changes.    Patient had above exam evaluation.  IV was established.  Labs EKG chest x-ray and CTs were obtained.    Work-up: EKG reviewed and interpreted by myself corroborated by her Arturo shows sinus rhythm with ventricular rate of 88, no acute ST or T wave changes.  CBC is unremarkable grossly and not significantly changed from prior.  Her metabolic panel is significant for sodium 130, potassium 3.3.  High-sensitivity troponin 8, repeat is 9.  Chest x-ray reviewed by me interpreted by the radiologist shows bilateral basilar atelectasis left greater than right.  CT head reviewed by me but interpreted by the radiologist shows no acute intracranial finding.  Mild atrophy.  CTAs of the head and neck reveal Mild plaquing in the carotid bulbs bilaterally but no hemodynamically significant stenosis.  2. Moderate stenosis of the left vertebral artery shortly after the origin of the left PICA.  3. The right CARMENCITA is extremely small in caliber throughout its course and may occlude distally. This may be congenital, given that there is no evidence of infarct.    On reexamination, patient is resting quietly and in no distress.  She has no new complaints.  She remains neurologically stable.  NIH is 0.    Findings were discussed with her.  Symptoms are concerning for TIA or stroke.  She will be admitted to the hospital for further work-up.  Case and plan discussed with hospitalist.  Neurology also consulted.    Diagnosis and treatment plan discussed with patient.  Patient agreeable to plan.       Dizziness: acute illness or injury  Right sided weakness: acute illness or injury  Amount and/or Complexity of  Data Reviewed  Labs: ordered.  Radiology: ordered. Decision-making details documented in ED Course.  ECG/medicine tests: ordered.      Risk  Prescription drug management.  Decision regarding hospitalization.          Final diagnoses:   Right sided weakness   Dizziness       ED Disposition  ED Disposition     ED Disposition   Decision to Admit    Condition   --    Comment   Level of Care: Telemetry [5]   Diagnosis: Right sided weakness [262032]   Admitting Physician: JOSELUIS JOHNSON [[id number]]   Attending Physician: JOSELUIS JOHNSON [[id number]]               No follow-up provider specified.       Medication List      No changes were made to your prescriptions during this visit.          Angie Arana, AGNES  04/19/23 [id number]

## 2023-04-19 NOTE — TELEPHONE ENCOUNTER
Numbness symptoms that are listed on the schedule for this appointment, I believe this patient needs to go to the emergency room.  If she is having dizziness and weakness on her left side she needs to be worked up by stroke protocol as soon as possible.  I recommend she be taken the [facility].  EMS is indicated in this situation

## 2023-04-19 NOTE — Clinical Note
Level of Care: Telemetry [5]   Admitting Physician: JOSELUIS JOHNSON [[id number]]   Attending Physician: JOSELUIS JOHNSON [[id number]]   Bed Request Comments: [facility]

## 2023-04-19 NOTE — PLAN OF CARE
Goal Outcome Evaluation:              Problem: Adult Inpatient Plan of Care  Goal: Plan of Care Review  Outcome: Ongoing, Progressing  Goal: Patient-Specific Goal (Individualized)  Outcome: Ongoing, Progressing  Goal: Absence of Hospital-Acquired Illness or Injury  Outcome: Ongoing, Progressing  Intervention: Identify and Manage Fall Risk  Recent Flowsheet Documentation  Taken 4/19/2023 1815 by Sintia Rodriguez RN  Safety Promotion/Fall Prevention:   activity supervised   assistive device/personal items within reach   clutter free environment maintained   safety round/check completed   room organization consistent   nonskid shoes/slippers when out of bed   lighting adjusted   fall prevention program maintained  Intervention: Prevent and Manage VTE (Venous Thromboembolism) Risk  Recent Flowsheet Documentation  Taken 4/19/2023 1815 by Sintia Rodriguez RN  VTE Prevention/Management:   bilateral   sequential compression devices on  Intervention: Prevent Infection  Recent Flowsheet Documentation  Taken 4/19/2023 1815 by Sintia Rodriguez RN  Infection Prevention:   single patient room provided   rest/sleep promoted   personal protective equipment utilized   hand hygiene promoted   equipment surfaces disinfected  Goal: Optimal Comfort and Wellbeing  Outcome: Ongoing, Progressing  Intervention: Monitor Pain and Promote Comfort  Recent Flowsheet Documentation  Taken 4/19/2023 1815 by Sintia Rodriguez RN  Pain Management Interventions: position adjusted  Intervention: Provide Person-Centered Care  Recent Flowsheet Documentation  Taken 4/19/2023 1815 by Sintia Rodriguez RN  Trust Relationship/Rapport:   thoughts/feelings acknowledged   reassurance provided   questions encouraged  Goal: Readiness for Transition of Care  Outcome: Ongoing, Progressing  Intervention: Mutually Develop Transition Plan  Recent Flowsheet Documentation  Taken 4/19/2023 1817 by Sintia Rodriguez RN  Transportation Anticipated: car, drives  self  Patient/Family Anticipated Services at Transition: none  Patient/Family Anticipates Transition to: home  Taken 4/19/2023 1116 by Sintia Rodriguez, RN  Equipment Currently Used at Home: none

## 2023-04-19 NOTE — TELEPHONE ENCOUNTER
Patient was advised to go to the emergency room and verbalized that she will head there now. Her  is home and he will be transporting her.

## 2023-04-20 ENCOUNTER — APPOINTMENT (OUTPATIENT)
Dept: CARDIOLOGY | Facility: HOSPITAL | Age: 78
End: 2023-04-20
Payer: MEDICARE

## 2023-04-20 VITALS
TEMPERATURE: 97.9 F | HEART RATE: 76 BPM | DIASTOLIC BLOOD PRESSURE: 60 MMHG | BODY MASS INDEX: 24.66 KG/M2 | RESPIRATION RATE: 16 BRPM | WEIGHT: 134 LBS | SYSTOLIC BLOOD PRESSURE: 109 MMHG | HEIGHT: 62 IN | OXYGEN SATURATION: 97 %

## 2023-04-20 LAB
ANION GAP SERPL CALCULATED.3IONS-SCNC: 12 MMOL/L (ref 5–15)
BASOPHILS # BLD AUTO: 0.1 10*3/MM3 (ref 0–0.2)
BASOPHILS NFR BLD AUTO: 0.8 % (ref 0–1.5)
BH CV ECHO MEAS - ACS: 1.7 CM
BH CV ECHO MEAS - AO MAX PG: 5.1 MMHG
BH CV ECHO MEAS - AO MEAN PG: 3 MMHG
BH CV ECHO MEAS - AO ROOT DIAM: 3.3 CM
BH CV ECHO MEAS - AO V2 MAX: 112.9 CM/SEC
BH CV ECHO MEAS - AO V2 VTI: 22.9 CM
BH CV ECHO MEAS - AVA(I,D): 2.43 CM2
BH CV ECHO MEAS - EDV(CUBED): 88.1 ML
BH CV ECHO MEAS - EDV(MOD-SP4): 62.2 ML
BH CV ECHO MEAS - EF(MOD-SP4): 66.5 %
BH CV ECHO MEAS - ESV(CUBED): 13.9 ML
BH CV ECHO MEAS - ESV(MOD-SP4): 20.8 ML
BH CV ECHO MEAS - FS: 46 %
BH CV ECHO MEAS - IVS/LVPW: 1.09 CM
BH CV ECHO MEAS - IVSD: 0.93 CM
BH CV ECHO MEAS - LV DIASTOLIC VOL/BSA (35-75): 38.6 CM2
BH CV ECHO MEAS - LV MASS(C)D: 129.5 GRAMS
BH CV ECHO MEAS - LV MAX PG: 3.3 MMHG
BH CV ECHO MEAS - LV MEAN PG: 1.91 MMHG
BH CV ECHO MEAS - LV SYSTOLIC VOL/BSA (12-30): 12.9 CM2
BH CV ECHO MEAS - LV V1 MAX: 91.3 CM/SEC
BH CV ECHO MEAS - LV V1 VTI: 20.1 CM
BH CV ECHO MEAS - LVIDD: 4.5 CM
BH CV ECHO MEAS - LVIDS: 2.4 CM
BH CV ECHO MEAS - LVOT AREA: 2.8 CM2
BH CV ECHO MEAS - LVOT DIAM: 1.88 CM
BH CV ECHO MEAS - LVPWD: 0.86 CM
BH CV ECHO MEAS - MV A MAX VEL: 95.5 CM/SEC
BH CV ECHO MEAS - MV DEC SLOPE: 446 CM/SEC2
BH CV ECHO MEAS - MV DEC TIME: 0.18 MSEC
BH CV ECHO MEAS - MV E MAX VEL: 78.3 CM/SEC
BH CV ECHO MEAS - MV E/A: 0.82
BH CV ECHO MEAS - MV MAX PG: 4 MMHG
BH CV ECHO MEAS - MV MEAN PG: 1.98 MMHG
BH CV ECHO MEAS - MV V2 VTI: 23.3 CM
BH CV ECHO MEAS - MVA(VTI): 2.39 CM2
BH CV ECHO MEAS - PA ACC TIME: 0.14 SEC
BH CV ECHO MEAS - PA PR(ACCEL): 15.2 MMHG
BH CV ECHO MEAS - PA V2 MAX: 89.6 CM/SEC
BH CV ECHO MEAS - PULM A REVS DUR: 0.09 SEC
BH CV ECHO MEAS - PULM A REVS VEL: 39.7 CM/SEC
BH CV ECHO MEAS - PULM DIAS VEL: 40.6 CM/SEC
BH CV ECHO MEAS - PULM S/D: 1.62
BH CV ECHO MEAS - PULM SYS VEL: 65.8 CM/SEC
BH CV ECHO MEAS - RV MAX PG: 3.1 MMHG
BH CV ECHO MEAS - RV V1 MAX: 88.3 CM/SEC
BH CV ECHO MEAS - RV V1 VTI: 20.1 CM
BH CV ECHO MEAS - RVDD: 2.22 CM
BH CV ECHO MEAS - SI(MOD-SP4): 25.7 ML/M2
BH CV ECHO MEAS - SV(LVOT): 55.7 ML
BH CV ECHO MEAS - SV(MOD-SP4): 41.4 ML
BH CV ECHO SHUNT ASSESSMENT PERFORMED (HIDDEN SCRIPTING): 1
BUN SERPL-MCNC: 10 MG/DL (ref 8–23)
BUN/CREAT SERPL: 18.5 (ref 7–25)
CALCIUM SPEC-SCNC: 9.5 MG/DL (ref 8.6–10.5)
CHLORIDE SERPL-SCNC: 96 MMOL/L (ref 98–107)
CO2 SERPL-SCNC: 23 MMOL/L (ref 22–29)
CREAT SERPL-MCNC: 0.54 MG/DL (ref 0.57–1)
DEPRECATED RDW RBC AUTO: 45.5 FL (ref 37–54)
EGFRCR SERPLBLD CKD-EPI 2021: 93.8 ML/MIN/1.73
EOSINOPHIL # BLD AUTO: 0.3 10*3/MM3 (ref 0–0.4)
EOSINOPHIL NFR BLD AUTO: 2.8 % (ref 0.3–6.2)
ERYTHROCYTE [DISTWIDTH] IN BLOOD BY AUTOMATED COUNT: 15.3 % (ref 12.3–15.4)
GLUCOSE BLDC GLUCOMTR-MCNC: 113 MG/DL (ref 70–105)
GLUCOSE SERPL-MCNC: 94 MG/DL (ref 65–99)
HCT VFR BLD AUTO: 34.6 % (ref 34–46.6)
HGB BLD-MCNC: 11.3 G/DL (ref 12–15.9)
LV EF 2D ECHO EST: 67 %
LYMPHOCYTES # BLD AUTO: 0.9 10*3/MM3 (ref 0.7–3.1)
LYMPHOCYTES NFR BLD AUTO: 9.9 % (ref 19.6–45.3)
MAXIMAL PREDICTED HEART RATE: 141 BPM
MCH RBC QN AUTO: 26.5 PG (ref 26.6–33)
MCHC RBC AUTO-ENTMCNC: 32.8 G/DL (ref 31.5–35.7)
MCV RBC AUTO: 80.8 FL (ref 79–97)
MONOCYTES # BLD AUTO: 0.7 10*3/MM3 (ref 0.1–0.9)
MONOCYTES NFR BLD AUTO: 7.9 % (ref 5–12)
NEUTROPHILS NFR BLD AUTO: 7.3 10*3/MM3 (ref 1.7–7)
NEUTROPHILS NFR BLD AUTO: 78.6 % (ref 42.7–76)
NRBC BLD AUTO-RTO: 0 /100 WBC (ref 0–0.2)
PLATELET # BLD AUTO: 576 10*3/MM3 (ref 140–450)
PMV BLD AUTO: 6.9 FL (ref 6–12)
POTASSIUM SERPL-SCNC: 3.8 MMOL/L (ref 3.5–5.2)
QT INTERVAL: 367 MS
RBC # BLD AUTO: 4.28 10*6/MM3 (ref 3.77–5.28)
SODIUM SERPL-SCNC: 131 MMOL/L (ref 136–145)
STRESS TARGET HR: 120 BPM
VIT B12 BLD-MCNC: 1004 PG/ML (ref 211–946)
WBC NRBC COR # BLD: 9.3 10*3/MM3 (ref 3.4–10.8)

## 2023-04-20 PROCEDURE — 97165 OT EVAL LOW COMPLEX 30 MIN: CPT

## 2023-04-20 PROCEDURE — G0378 HOSPITAL OBSERVATION PER HR: HCPCS

## 2023-04-20 PROCEDURE — 93306 TTE W/DOPPLER COMPLETE: CPT

## 2023-04-20 PROCEDURE — 99223 1ST HOSP IP/OBS HIGH 75: CPT | Performed by: NURSE PRACTITIONER

## 2023-04-20 PROCEDURE — 82962 GLUCOSE BLOOD TEST: CPT

## 2023-04-20 PROCEDURE — 36415 COLL VENOUS BLD VENIPUNCTURE: CPT | Performed by: NURSE PRACTITIONER

## 2023-04-20 PROCEDURE — 82607 VITAMIN B-12: CPT | Performed by: NURSE PRACTITIONER

## 2023-04-20 PROCEDURE — 94799 UNLISTED PULMONARY SVC/PX: CPT

## 2023-04-20 PROCEDURE — 94664 DEMO&/EVAL PT USE INHALER: CPT

## 2023-04-20 PROCEDURE — 93306 TTE W/DOPPLER COMPLETE: CPT | Performed by: INTERNAL MEDICINE

## 2023-04-20 PROCEDURE — 97161 PT EVAL LOW COMPLEX 20 MIN: CPT

## 2023-04-20 PROCEDURE — 94761 N-INVAS EAR/PLS OXIMETRY MLT: CPT

## 2023-04-20 PROCEDURE — 80048 BASIC METABOLIC PNL TOTAL CA: CPT | Performed by: NURSE PRACTITIONER

## 2023-04-20 PROCEDURE — 85025 COMPLETE CBC W/AUTO DIFF WBC: CPT | Performed by: NURSE PRACTITIONER

## 2023-04-20 RX ORDER — ROSUVASTATIN CALCIUM 5 MG/1
5 TABLET, COATED ORAL EVERY OTHER DAY
Status: DISCONTINUED | OUTPATIENT
Start: 2023-04-20 | End: 2023-04-20 | Stop reason: HOSPADM

## 2023-04-20 RX ORDER — ROSUVASTATIN CALCIUM 5 MG/1
5 TABLET, COATED ORAL EVERY OTHER DAY
Qty: 90 TABLET | Refills: 0 | Status: SHIPPED | OUTPATIENT
Start: 2023-04-20

## 2023-04-20 RX ORDER — ASPIRIN 81 MG/1
81 TABLET ORAL DAILY
Status: DISCONTINUED | OUTPATIENT
Start: 2023-04-21 | End: 2023-04-20 | Stop reason: HOSPADM

## 2023-04-20 RX ORDER — ASPIRIN 81 MG/1
81 TABLET ORAL DAILY
Qty: 100 TABLET | Refills: 0 | Status: SHIPPED | OUTPATIENT
Start: 2023-04-21

## 2023-04-20 RX ORDER — LOSARTAN POTASSIUM 50 MG/1
100 TABLET ORAL DAILY
Status: DISCONTINUED | OUTPATIENT
Start: 2023-04-20 | End: 2023-04-20 | Stop reason: HOSPADM

## 2023-04-20 RX ORDER — AMLODIPINE BESYLATE 5 MG/1
5 TABLET ORAL DAILY
Status: DISCONTINUED | OUTPATIENT
Start: 2023-04-20 | End: 2023-04-20 | Stop reason: HOSPADM

## 2023-04-20 RX ADMIN — ROSUVASTATIN CALCIUM 5 MG: 5 TABLET, COATED ORAL at 12:16

## 2023-04-20 RX ADMIN — PANTOPRAZOLE SODIUM 40 MG: 40 TABLET, DELAYED RELEASE ORAL at 08:24

## 2023-04-20 RX ADMIN — CETIRIZINE HYDROCHLORIDE 10 MG: 10 TABLET, FILM COATED ORAL at 08:25

## 2023-04-20 RX ADMIN — BUDESONIDE AND FORMOTEROL FUMARATE DIHYDRATE 2 PUFF: 160; 4.5 AEROSOL RESPIRATORY (INHALATION) at 06:55

## 2023-04-20 RX ADMIN — HYDROCHLOROTHIAZIDE 12.5 MG: 12.5 TABLET ORAL at 08:24

## 2023-04-20 RX ADMIN — CLOPIDOGREL BISULFATE 75 MG: 75 TABLET ORAL at 08:24

## 2023-04-20 RX ADMIN — GUAIFENESIN 600 MG: 600 TABLET, EXTENDED RELEASE ORAL at 08:25

## 2023-04-20 RX ADMIN — Medication 10 ML: at 08:25

## 2023-04-20 NOTE — THERAPY EVALUATION
Patient Name: Cara Castañeda  : 1945    MRN: [id number]                              Today's Date: 2023       Admit Date: 2023    Visit Dx:     ICD-10-CM ICD-9-CM   1. Right sided weakness  R53.1 728.87   2. Dizziness  R42 780.4     Patient Active Problem List   Diagnosis   • Allergic rhinitis   • Asthma   • Benign hypertension   • Gastroesophageal reflux disease   • Generalized osteoarthritis   • Hyperlipidemia   • Hypertensive heart disease   • Prediabetes   • Insomnia   • Other long term (current) drug therapy   • Osteopenia with high risk of fracture   • Mixed stress and urge urinary incontinence   • Vitamin B12 deficiency   • Dry eye syndrome of bilateral lacrimal glands   • Bilateral pseudophakia   • Hypermetropia, bilateral   • Pseudophakia   • Regular astigmatism, left eye   • Presence of intraocular lens   • Epiretinal membrane (ERM) of left eye   • Puckering of macula, bilateral   • Tear film insufficiency   • Bruit of left carotid artery   • Cystocele with rectocele   • Chest pain   • Palpitations   • Bronchiectasis without complication   • BMI 25.0-25.9,adult   • Right sided weakness     Past Medical History:   Diagnosis Date   • Allergic rhinitis     on immunotherapy- Dr. Benito    • Asthma     Dr. Benito   • Bruit of left carotid artery 2020   • Carotid stenosis     <50%   • Cold sore    • Gallstone pancreatitis    • GERD (gastroesophageal reflux disease)     Dr. Oliva   • Hiatal hernia     small, sliding    • Hyperlipidemia    • Hypertension    • Left ventricular hypertrophy    • Osteoarthritis     Dr. Avila   • Osteopenia with high risk of fracture    • Pneumonia March, April, May,2022   • Pre-diabetes    • Vocal cord nodule     Dr. Nieves     Past Surgical History:   Procedure Laterality Date   • APPENDECTOMY     • CHOLECYSTECTOMY  2006    Dr. Hernandez   • ENDOSCOPY  2017    & Dilation. Dr. Oliva   • ERCP  2006    Dr. Oliva   • HYSTERECTOMY  1974     ovaries remain, for benign reasons   • OTHER SURGICAL HISTORY  2009    Stress Echo. No ischemia. EF 60-65%.    • TONSILLECTOMY AND ADENOIDECTOMY     • TOTAL KNEE ARTHROPLASTY  08/31/2017      General Information     Row Name 04/20/23 1317          Physical Therapy Time and Intention    Document Type evaluation  -SS (r) MB (t) SS (c)     Mode of Treatment physical therapy  -SS (r) MB (t) SS (c)     Row Name 04/20/23 1317          General Information    Patient Profile Reviewed yes  -SS (r) MB (t) SS (c)     Prior Level of Function independent:;all household mobility;community mobility;gait;transfer;ADL's;home management;driving;work  -SS (r) MB (t) SS (c)     Existing Precautions/Restrictions fall  -SS (r) MB (t) SS (c)     Barriers to Rehab none identified  -SS (r) MB (t) SS (c)     Row Name 04/20/23 1317          Living Environment    People in Home spouse  -SS (r) MB (t) SS (c)     Row Name 04/20/23 1317          Home Main Entrance    Number of Stairs, Main Entrance three  -SS (r) MB (t) SS (c)     Stair Railings, Main Entrance railings safe and in good condition  -SS (r) MB (t) SS (c)     Row Name 04/20/23 1317          Stairs Within Home, Primary    Number of Stairs, Within Home, Primary none  -SS (r) MB (t) SS (c)     Row Name 04/20/23 1317          Cognition    Orientation Status (Cognition) oriented x 4  -SS (r) MB (t) SS (c)           User Key  (r) = Recorded By, (t) = Taken By, (c) = Cosigned By    Initials Name Provider Type    SS Elen Freeman, PT Physical Therapist    Kalpesh Machado, PT Student PT Student               Mobility     Row Name 04/20/23 1318          Bed Mobility    Bed Mobility bed mobility (all) activities  -SS (r) MB (t) SS (c)     All Activities, [place] (Bed Mobility) independent  -SS (r) MB (t) SS (c)     Row Name 04/20/23 1318          Bed-Chair Transfer    Bed-Chair [place] (Transfers) independent  -SS (r) MB (t) SS (c)     Comment, (Bed-Chair Transfer) no AD  -SS  (r) MB (t) SS (c)     Row Name 04/20/23 1318          Sit-Stand Transfer    Sit-Stand [place] (Transfers) independent  -SS (r) MB (t) SS (c)     Comment, (Sit-Stand Transfer) no AD  -SS (r) MB (t) SS (c)     Row Name 04/20/23 1318          Gait/Stairs (Locomotion)    [place] Level (Gait) contact guard;standby assist  -SS (r) MB (t) SS (c)     Distance in Feet (Gait) 200  -SS (r) MB (t) SS (c)     Comment, (Gait/Stairs) 200' CGA>SBA with no AD  -SS (r) MB (t) SS (c)           User Key  (r) = Recorded By, (t) = Taken By, (c) = Cosigned By    Initials Name Provider Type    SS Elen Freeman, PT Physical Therapist    Kalpesh Machado, HARIKA Student PT Student               Obj/Interventions     Row Name 04/20/23 1319          Range of Motion Comprehensive    General Range of Motion no range of motion deficits identified  -SS (r) MB (t) SS (c)     Row Name 04/20/23 1319          Strength Comprehensive (MMT)    General Manual Muscle Testing (MMT) Assessment no strength deficits identified  -SS (r) MB (t) SS (c)     Row Name 04/20/23 1319          Balance    Balance Assessment sitting static balance;sitting dynamic balance;sit to stand dynamic balance;standing static balance;standing dynamic balance  -SS (r) MB (t) SS (c)     Static Sitting Balance independent  -SS (r) MB (t) SS (c)     Dynamic Sitting Balance independent  -SS (r) MB (t) SS (c)     Position, Sitting Balance unsupported;sitting edge of bed  -SS (r) MB (t) SS (c)     Sit to Stand Dynamic Balance independent  -SS (r) MB (t) SS (c)     Static Standing Balance independent  -SS (r) MB (t) SS (c)     Dynamic Standing Balance contact guard  -SS (r) MB (t) SS (c)     Balance Interventions narrowed base of support;single limb stance;standing;combined head and eye movements  -SS (r) MB (t) SS (c)     Row Name 04/20/23 1319          Sensory Assessment (Somatosensory)    Sensory Assessment (Somatosensory) sensation intact  -SS (r) MB (t) SS (c)            User Key  (r) = Recorded By, (t) = Taken By, (c) = Cosigned By    Initials Name Provider Type    SS Elen Freeman, PT Physical Therapist    Kalpesh Machado, HARIKA Student PT Student               Goals/Plan    No documentation.                Clinical Impression     Row Name 04/20/23 1314          Pain    Pretreatment Pain Rating 0/10 - no pain  -SS (r) MB (t) SS (c)     Posttreatment Pain Rating 0/10 - no pain  -SS (r) MB (t) SS (c)     Row Name 04/20/23 1335 04/20/23 1319       Plan of Care Review    Plan of Care Reviewed With -- patient  -SS (r) MB (t) SS (c)    Progress -- improving  -SS (r) MB (t) SS (c)    Outcome Evaluation Pt is a 78 y/o F admitted with intermittent R UE/LE weakness and tingling. She reports having difficulty with dragging of R foot when walking. She states that numbness has since resolved, but that she experienced a fall into the recliner yesterday, but with no injuries. PMH includes HLD, HTN, RA, and carotid stenosis. Brain MRI revealed no acute infarct, CT head was negative, and CT Angiogram of Head/Neck revealed moderate stenosis of L vertebral artery and mild plaquing in carotid bulbs bilaterally. At baseline, pt is living with spouse in single story home with 3 steps to enter. She was IND prior with household mobility, community ambulation, ADLs, driving, and volunteering at homeless facility. Currently, pt is completing bed mobility IND, transfers IND, and amb 200' CGA>SBA with no AD. Dynamic balance testing also performed today and pt shows no functional deficits. Symptoms have resolved, R Ankle DF strength WFL, and pt shows no dragging of R foot with gait assessment. She will not need PT during stay and will be safe to return home with no additional PT services at d/c.  -SS (r) MB (t) SS (c) --    Row Name 04/20/23 7797          Therapy Assessment/Plan (PT)    Criteria for Skilled Interventions Met (PT) no  -SS (r) MB (t) SS (c)     Therapy Frequency (PT) evaluation only  -SS (r)  MB (t) SS (c)     Predicted Duration of Therapy Intervention (PT) eval only  -SS (r) MB (t) SS (c)     Row Name 04/20/23 1319          Vital Signs    O2 Delivery Pre Treatment room air  -SS (r) MB (t) SS (c)     O2 Delivery Intra Treatment room air  -SS (r) MB (t) SS (c)     O2 Delivery Post Treatment room air  -SS (r) MB (t) SS (c)     Pre Patient Position Standing  -SS (r) MB (t) SS (c)     Intra Patient Position Standing  -SS (r) MB (t) SS (c)     Post Patient Position Sitting  -SS (r) MB (t) SS (c)     Row Name 04/20/23 1319          Positioning and Restraints    Pre-Treatment Position standing in room  -SS (r) MB (t) SS (c)     Post Treatment Position chair  -SS (r) MB (t) SS (c)     In Chair notified nsg;with OT  -SS (r) MB (t) SS (c)           User Key  (r) = Recorded By, (t) = Taken By, (c) = Cosigned By    Initials Name Provider Type    SS Elen Freeman, PT Physical Therapist    Kalpesh Machado, PT Student PT Student               Outcome Measures     Row Name 04/20/23 1320          How much help from another person do you currently need...    Turning from your back to your side while in flat bed without using bedrails? 4  -SS (r) MB (t) SS (c)     Moving from lying on back to sitting on the side of a flat bed without bedrails? 4  -SS (r) MB (t) SS (c)     Moving to and from a bed to a chair (including a wheelchair)? 4  -SS (r) MB (t) SS (c)     Standing up from a chair using your arms (e.g., wheelchair, bedside chair)? 4  -SS (r) MB (t) SS (c)     Climbing 3-5 steps with a railing? 3  -SS (r) MB (t) SS (c)     To walk in hospital room? 4  -SS (r) MB (t) SS (c)     AM-PAC 6 Clicks Score (PT) 23  -SS (r) MB (t)     Highest level of mobility 7 --> Walked 25 feet or more  -SS (r) MB (t)     Row Name 04/20/23 1320          Modified [place] Scale    Pre-Stroke Modified [place] Scale 0 - No Symptoms at all.  -SS (r) MB (t) SS (c)     Modified [place] Scale 1 - No significant disability despite symptoms.  Able  to carry out all usual duties and activities.  -SS (r) MB (t) SS (c)     Row Name 04/20/23 1320          Functional Assessment    Outcome Measure Options AM-PAC 6 Clicks Basic Mobility (PT);Modified [place]  -SS (r) MB (t) SS (c)           User Key  (r) = Recorded By, (t) = Taken By, (c) = Cosigned By    Initials Name Provider Type    SS Elen Freeman, PT Physical Therapist    Kalpesh Machado, PT Student PT Student                             Physical Therapy Education     Title: PT OT SLP Therapies (Resolved)     Topic: Physical Therapy (Resolved)     Point: Mobility training (Resolved)     Learning Progress Summary           Patient Acceptance, E,TB, VU by MB at 4/20/2023 1321                   Point: Home exercise program (Resolved)     Learning Progress Summary           Patient Acceptance, E,TB, VU by MB at 4/20/2023 1321                   Point: Body mechanics (Resolved)     Learning Progress Summary           Patient Acceptance, E,TB, VU by MB at 4/20/2023 1321                   Point: Precautions (Resolved)     Learning Progress Summary           Patient Acceptance, E,TB, VU by MB at 4/20/2023 1321                               User Key     Initials Effective Dates Name Provider Type Discipline    MB 01/30/23 -  Kalpesh Louis, PT Student PT Student PT              PT Recommendation and Plan     Plan of Care Reviewed With: patient  Progress: improving  Outcome Evaluation: Pt is a 76 y/o F admitted with intermittent R UE/LE weakness and tingling. She reports having difficulty with dragging of R foot when walking. She states that numbness has since resolved, but that she experienced a fall into the recliner yesterday, but with no injuries. PMH includes HLD, HTN, RA, and carotid stenosis. Brain MRI revealed no acute infarct, CT head was negative, and CT Angiogram of Head/Neck revealed moderate stenosis of L vertebral artery and mild plaquing in carotid bulbs bilaterally. At baseline, pt is living with spouse  in single story home with 3 steps to enter. She was IND prior with household mobility, community ambulation, ADLs, driving, and volunteering at homeless facility. Currently, pt is completing bed mobility IND, transfers IND, and amb 200' CGA>SBA with no AD. Dynamic balance testing also performed today and pt shows no functional deficits. Symptoms have resolved, R Ankle DF strength WFL, and pt shows no dragging of R foot with gait assessment. She will not need PT during stay and will be safe to return home with no additional PT services at d/c.     Time Calculation:    PT Charges     Row Name 04/20/23 1321             Time Calculation    Start Time 0954  -SS (r) MB (t) SS (c)      Stop Time 1018  -SS (r) MB (t) SS (c)      Time Calculation (min) 24 min  -SS (r) MB (t)      PT Received On 04/20/23  -SS (r) MB (t) SS (c)         Time Calculation- PT    Total Timed Code Minutes- PT 0 minute(s) (P)   -MB            User Key  (r) = Recorded By, (t) = Taken By, (c) = Cosigned By    Initials Name Provider Type     Elen Freeman, PT Physical Therapist    Kalpesh Machado, PT Student PT Student              Therapy Charges for Today     Code Description Service Date Service Provider Modifiers Qty    [id number] HC PT EVAL LOW COMPLEXITY 4 4/20/2023 Kalpesh Louis, PT Student GP 1          PT G-Codes  Outcome Measure Options: AM-PAC 6 Clicks Basic Mobility (PT), Modified [place]  AM-PAC 6 Clicks Score (PT): 23  Modified [place] Scale: 1 - No significant disability despite symptoms.  Able to carry out all usual duties and activities.  PT Discharge Summary  Anticipated Discharge Disposition (PT): home    Kalpesh Louis PT Student  4/20/2023

## 2023-04-20 NOTE — THERAPY EVALUATION
Patient Name: Cara Castañeda  : 1945    MRN: [id number]                              Today's Date: 2023       Admit Date: 2023    Visit Dx:     ICD-10-CM ICD-9-CM   1. Right sided weakness  R53.1 728.87   2. Dizziness  R42 780.4     Patient Active Problem List   Diagnosis   • Allergic rhinitis   • Asthma   • Benign hypertension   • Gastroesophageal reflux disease   • Generalized osteoarthritis   • Hyperlipidemia   • Hypertensive heart disease   • Prediabetes   • Insomnia   • Other long term (current) drug therapy   • Osteopenia with high risk of fracture   • Mixed stress and urge urinary incontinence   • Vitamin B12 deficiency   • Dry eye syndrome of bilateral lacrimal glands   • Bilateral pseudophakia   • Hypermetropia, bilateral   • Pseudophakia   • Regular astigmatism, left eye   • Presence of intraocular lens   • Epiretinal membrane (ERM) of left eye   • Puckering of macula, bilateral   • Tear film insufficiency   • Bruit of left carotid artery   • Cystocele with rectocele   • Chest pain   • Palpitations   • Bronchiectasis without complication   • BMI 25.0-25.9,adult   • Right sided weakness     Past Medical History:   Diagnosis Date   • Allergic rhinitis     on immunotherapy- Dr. Benito    • Asthma     Dr. Benito   • Bruit of left carotid artery 2020   • Carotid stenosis     <50%   • Cold sore    • Gallstone pancreatitis    • GERD (gastroesophageal reflux disease)     Dr. Oliva   • Hiatal hernia     small, sliding    • Hyperlipidemia    • Hypertension    • Left ventricular hypertrophy    • Osteoarthritis     Dr. Avila   • Osteopenia with high risk of fracture    • Pneumonia March, April, May,2022   • Pre-diabetes    • Vocal cord nodule     Dr. Nieves     Past Surgical History:   Procedure Laterality Date   • APPENDECTOMY     • CHOLECYSTECTOMY  2006    Dr. Hernandez   • ENDOSCOPY  2017    & Dilation. Dr. Oliva   • ERCP  2006    Dr. Oliva   • HYSTERECTOMY  1974     ovaries remain, for benign reasons   • OTHER SURGICAL HISTORY  2009    Stress Echo. No ischemia. EF 60-65%.    • TONSILLECTOMY AND ADENOIDECTOMY     • TOTAL KNEE ARTHROPLASTY  08/31/2017      General Information     Row Name 04/20/23 1635          OT Time and Intention    Document Type evaluation  -MP     Mode of Treatment occupational therapy  -MP     Row Name 04/20/23 1635          General Information    Patient Profile Reviewed yes  -MP     Prior Level of Function independent:;ADL's  -MP     Row Name 04/20/23 1635          Living Environment    People in Home spouse  -MP     Row Name 04/20/23 1635          Cognition    Orientation Status (Cognition) oriented x 4  -MP           User Key  (r) = Recorded By, (t) = Taken By, (c) = Cosigned By    Initials Name Provider Type    Jai Newby OT Occupational Therapist                 Mobility/ADL's     Row Name 04/20/23 1636          Bed Mobility    Bed Mobility bed mobility (all) activities  -     All Activities, [place] (Bed Mobility) independent  -     Row Name 04/20/23 1636          Transfers    Transfers sit-stand transfer  -     Row Name 04/20/23 1636          Bed-Chair Transfer    Bed-Chair [place] (Transfers) independent  -MP     Row Name 04/20/23 1636          Sit-Stand Transfer    Sit-Stand [place] (Transfers) independent  -     Row Name 04/20/23 1636          Functional Mobility    Functional Mobility- Ind. Level independent  -     Row Name 04/20/23 1636          Activities of Daily Living    BADL Assessment/Intervention lower body dressing  -     Row Name 04/20/23 1636          Lower Body Dressing Assessment/Training    [place] Level (Lower Body Dressing) lower body dressing skills;set up  -MP           User Key  (r) = Recorded By, (t) = Taken By, (c) = Cosigned By    Initials Name Provider Type    Jai Newby OT Occupational Therapist               Obj/Interventions     Row Name 04/20/23 1637           Range of Motion Comprehensive    Comment, General Range of Motion BUE WFL  -MP     Row Name 04/20/23 1637          Strength Comprehensive (MMT)    Comment, General Manual Muscle Testing (MMT) Assessment B  3+/5, 4-/5 elbow/shoulder  -MP     Row Name 04/20/23 1637          Balance    Balance Interventions sitting;standing;sit to stand;supported;static;dynamic  -MP           User Key  (r) = Recorded By, (t) = Taken By, (c) = Cosigned By    Initials Name Provider Type    Jai Newby OT Occupational Therapist               Goals/Plan    No documentation.                Clinical Impression     Row Name 04/20/23 1638          Pain Assessment    Pretreatment Pain Rating 0/10 - no pain  -MP     Posttreatment Pain Rating 0/10 - no pain  -MP     Row Name 04/20/23 1638          Plan of Care Review    Plan of Care Reviewed With patient  -MP     Progress no change  -MP     Outcome Evaluation Pt is a 76 y/o F admitted with intermittent R UE/LE weakness and tingling. She reports having difficulty with dragging of R foot when walking. She states that numbness has since resolved, but that she experienced a fall into the recliner yesterday, but with no injuries. PMH includes HLD, HTN, RA, and carotid stenosis. Brain MRI revealed no acute infarct, CT head was negative, and CT Angiogram of Head/Neck revealed moderate stenosis of L vertebral artery and mild plaquing in carotid bulbs bilaterally. Pt. lives at home w/ spouse at baseline, maintains I/ADL independence and still working. Pt. completes all functional transfers and ADLs unassisted this date, appears to be at baseline funcitonal independence. Pt. w/ c/o R hand sesnations changes since janurary, reports arthritis BUEs and has appt to see rheumatologist. Pt. (-) median nerve compression test. Pt. would benefit from OPOT to address strength/ROM deficits and modify ADLs for improve independence. Pt. does not require futhur skilled IP OT.  -MP     Row Name 04/20/23  1638          Therapy Assessment/Plan (OT)    Criteria for Skilled Therapeutic Interventions Met (OT) no;no problems identified which require skilled intervention;does not meet criteria for skilled intervention  -MP     Therapy Frequency (OT) evaluation only  -MP     Row Name 04/20/23 1638          Therapy Plan Review/Discharge Plan (OT)    Anticipated Discharge Disposition (OT) home with assist  -MP     Row Name 04/20/23 1638          Vital Signs    Pre Patient Position Supine  -MP     Intra Patient Position Standing  -MP     Post Patient Position Sitting  -MP     Row Name 04/20/23 1638          Positioning and Restraints    Pre-Treatment Position in bed  -MP     Post Treatment Position chair  -MP     In Chair sitting;encouraged to call for assist;call light within reach;exit alarm on  -MP           User Key  (r) = Recorded By, (t) = Taken By, (c) = Cosigned By    Initials Name Provider Type    Jai Newby, OT Occupational Therapist               Outcome Measures     Row Name 04/20/23 1320          How much help from another person do you currently need...    Turning from your back to your side while in flat bed without using bedrails? 4  -SS (r) MB (t) SS (c)     Moving from lying on back to sitting on the side of a flat bed without bedrails? 4  -SS (r) MB (t) SS (c)     Moving to and from a bed to a chair (including a wheelchair)? 4  -SS (r) MB (t) SS (c)     Standing up from a chair using your arms (e.g., wheelchair, bedside chair)? 4  -SS (r) MB (t) SS (c)     Climbing 3-5 steps with a railing? 3  -SS (r) MB (t) SS (c)     To walk in hospital room? 4  -SS (r) MB (t) SS (c)     AM-PAC 6 Clicks Score (PT) 23  -SS (r) MB (t)     Highest level of mobility 7 --> Walked 25 feet or more  -SS (r) MB (t)     Row Name 04/20/23 1320          Modified [place] Scale    Pre-Stroke Modified [place] Scale 0 - No Symptoms at all.  -SS (r) MB (t) SS (c)     Modified [place] Scale 1 - No significant disability despite  symptoms.  Able to carry out all usual duties and activities.  -SS (r) MB (t) SS (c)     Row Name 04/20/23 1320          Functional Assessment    Outcome Measure Options AM-PAC 6 Clicks Basic Mobility (PT);Modified Oliva  -SS (r) MB (t) SS (c)           User Key  (r) = Recorded By, (t) = Taken By, (c) = Cosigned By    Initials Name Provider Type    SS Elen Freeman, PT Physical Therapist    Kalpesh Machado, PT Student PT Student                  OT Recommendation and Plan  Therapy Frequency (OT): evaluation only  Plan of Care Review  Plan of Care Reviewed With: patient  Progress: no change  Outcome Evaluation: Pt is a 76 y/o F admitted with intermittent R UE/LE weakness and tingling. She reports having difficulty with dragging of R foot when walking. She states that numbness has since resolved, but that she experienced a fall into the recliner yesterday, but with no injuries. PMH includes HLD, HTN, RA, and carotid stenosis. Brain MRI revealed no acute infarct, CT head was negative, and CT Angiogram of Head/Neck revealed moderate stenosis of L vertebral artery and mild plaquing in carotid bulbs bilaterally. Pt. lives at home w/ spouse at baseline, maintains I/ADL independence and still working. Pt. completes all functional transfers and ADLs unassisted this date, appears to be at baseline funcitonal independence. Pt. w/ c/o R hand sesnations changes since janurary, reports arthritis BUEs and has appt to see rheumatologist. Pt. (-) median nerve compression test. Pt. would benefit from OPOT to address strength/ROM deficits and modify ADLs for improve independence. Pt. does not require futhur skilled IP OT.     Time Calculation:    Time Calculation- OT     Row Name 04/20/23 7914             Time Calculation- OT    OT Start Time 1000  -MP      OT Stop Time 1030  -MP      OT Time Calculation (min) 30 min  -MP      Total Timed Code Minutes- OT 0 minute(s)  -MP      OT Received On 04/20/23  -MP            User Key   (r) = Recorded By, (t) = Taken By, (c) = Cosigned By    Initials Name Provider Type    Jai Newby OT Occupational Therapist              Therapy Charges for Today     Code Description Service Date Service Provider Modifiers Qty    89014461391 HC OT EVAL LOW COMPLEXITY 4 4/20/2023 Jai Bonner OT GO 1               Jai Bonner OT  4/20/2023

## 2023-04-20 NOTE — DISCHARGE SUMMARY
[facility]  DISCHARGE SUMMARY    Patient Name: Cara Castañeda  : 1945  MRN: [id number]    Date of Admission: 2023  Discharge Diagnosis:  1.  Right-sided weakness, resolved  2.  Possible TIA-resolved  3.  Hypertension  4.  Hypokalemia-repleted  5.  Hypertension  6.  DJD  7.  GERD.  Date of Discharge:    Primary Care Physician: Rocio Apodaca APRN      Presenting Problem:   Dizziness [R42]  Right sided weakness [R53.1]    Active and Resolved Hospital Problems:  Active Hospital Problems    Diagnosis POA   • **Right sided weakness [R53.1] Yes      Resolved Hospital Problems   No resolved problems to display.         Hospital Course   Brief Hospital course: 77 female pleasant female with known history of hypertension, gallstone pancreatitis  HLD, and DJD.  Patient was admitted on 2023, presented to [facility] ED complaining of right upper and lower extremities weakness with numbness that started at around 7:30 AM.  Patient denies any dysphagia, no dysarthria or speech impediment.  No chest pain, shortness of breath, dizziness, headache or lightheadedness.  CT of the head without contrast (), showed no acute intracranial finding and follow-up CTA of the head and neck revealed mild plaque in the carotid bulb bilaterally but no hemodynamically significant stenosis.  MRI of the brain () showed no acute stroke with mild chronic microvascular ischemic disease changes.  Patient was admitted for right upper extremities weakness concerning for TIA.  So far, she has ruled out for acute ischemic stroke.  She was seen in consultation by neurologist and continue on antiplatelet with aspirin and statin therapy.  2D echo () with result pending.  Patient brief hospital course was uneventful.  Right upper extremities weakness have resolved.  She is up and ambulating in the room without any difficulties.    Prior to discharge home, patient is afebrile, with  stable hemodynamics and oxygenating well on room air.    DISCHARGE Follow Up Recommendations for labs and diagnostics:   1.  Follow-up PCP and neurologist and include 2D echo report  2.  Continue aspirin and low-dose statin as listed in discharge summary.    Reasons For Change In Medications and Indications for New Medications:      Day of Discharge     Vital Signs:  Temp:  [97.9 °F (36.6 °C)-99 °F (37.2 °C)] 97.9 °F (36.6 °C)  Heart Rate:  [] 76  Resp:  [16-24] 17  BP: ()/(55-68) 109/60      Physical Exam  Constitutional:       General: She is not in acute distress.     Appearance: She is obese. She is not ill-appearing.   HENT:      Head: Normocephalic.      Right Ear: Tympanic membrane normal.      Nose: Nose normal.      Mouth/Throat:      Mouth: Mucous membranes are moist.   Eyes:      Pupils: Pupils are equal, round, and reactive to light.   Cardiovascular:      Rate and Rhythm: Normal rate.      Pulses: Normal pulses.   Pulmonary:      Effort: Pulmonary effort is normal.   Abdominal:      Palpations: Abdomen is soft.   Musculoskeletal:         General: Normal range of motion.      Cervical back: Normal range of motion and neck supple. No rigidity.   Skin:     General: Skin is warm.   Neurological:      General: No focal deficit present.      Mental Status: She is alert.            Pertinent  and/or Most Recent Results     LAB RESULTS:      Lab 04/20/23  0720 04/19/23  1016   WBC 9.30 11.30*   HEMOGLOBIN 11.3* 11.4*   HEMATOCRIT 34.6 35.3   PLATELETS 576* 569*   NEUTROS ABS 7.30* 9.40*   LYMPHS ABS 0.90 0.90   MONOS ABS 0.70 0.80   EOS ABS 0.30 0.20   MCV 80.8 81.7   PROTIME  --  10.3   APTT  --  28.8*         Lab 04/20/23  0720 04/19/23  1016   SODIUM 131* 130*   POTASSIUM 3.8 3.3*   CHLORIDE 96* 93*   CO2 23.0 24.0   ANION GAP 12.0 13.0   BUN 10 8   CREATININE 0.54* 0.53*   EGFR 93.8 94.2   GLUCOSE 94 126*   CALCIUM 9.5 9.7         Lab 04/19/23  1016   TOTAL PROTEIN 6.5   ALBUMIN 3.7   GLOBULIN  2.8   ALT (SGPT) 10   AST (SGOT) 12   BILIRUBIN 0.2   ALK PHOS 96         Lab 04/19/23  1235 04/19/23  1016   HSTROP T 9 8   PROTIME  --  10.3   INR  --  0.96             Lab 04/20/23  0720 04/19/23  1016   VITAMIN B 12 1,004*  --    ABO TYPING  --  O   RH TYPING  --  Positive   ANTIBODY SCREEN  --  Negative         Brief Urine Lab Results     None        Microbiology Results (last 10 days)     ** No results found for the last 240 hours. **          CT Head Without Contrast    Result Date: 4/19/2023  Impression: Impression: 1. No acute intracranial finding. 2. Mild atrophy. Electronically Signed: Rocio Penn  4/19/2023 12:01 PM EDT  Workstation ID: [id number]    CT Angiogram Neck    Result Date: 4/19/2023  Impression: Impression: 1. Mild plaquing in the carotid bulbs bilaterally but no hemodynamically significant stenosis. 2. Moderate stenosis of the left vertebral artery shortly after the origin of the left PICA. 3. The right CARMENCITA is extremely small in caliber throughout its course and may occlude distally. This may be congenital, given that there is no evidence of infarct. Electronically Signed: Berny Choi  4/19/2023 12:16 PM EDT  Workstation ID: [id number]    MRI Brain Without Contrast    Result Date: 4/19/2023  Impression: 1. No acute stroke. 2. Mild changes chronic microvascular ischemic disease. Electronically Signed: Berny Choi  4/19/2023 3:51 PM EDT  Workstation ID: [id number]    XR Chest 1 View    Result Date: 4/19/2023  Impression: Impression: Bilateral basilar atelectasis left greater than right. Electronically Signed: Gabriel Duffy  4/19/2023 10:45 AM EDT  Workstation ID: [id number]    DEXA Bone Density Axial    Result Date: 4/17/2023  Impression: Osteoporosis.   Copies of the computerized summary reports can be obtained from [employer] via the health information department of [facility].  Electronically Signed By-Abel Simms MD On:4/17/2023 8:31 AM This report was finalized on [id number] by  Abel  MD Mendez.    CT Angiogram Head w AI Analysis of LVO    Result Date: 4/19/2023  Impression: Impression: 1. Mild plaquing in the carotid bulbs bilaterally but no hemodynamically significant stenosis. 2. Moderate stenosis of the left vertebral artery shortly after the origin of the left PICA. 3. The right CARMENCITA is extremely small in caliber throughout its course and may occlude distally. This may be congenital, given that there is no evidence of infarct. Electronically Signed: Berny Choi  4/19/2023 12:16 PM EDT  Workstation ID: [id number]    Mammo Screening Digital Tomosynthesis Bilateral With CAD    Result Date: 4/14/2023  Impression: No mammographic signs of malignancy. Recommend routine mammographic screening.  BI-RADS ASSESSMENT: BI-RADS 1. Negative.  The patient's information is entered into a computerized reminder system with a targeted due date for the next mammogram.  Note:  It has been reported that there is approximately a 15% false negative in mammography.  Therefore, management of a palpable abnormality should not be deferred because of a negative mammogram.    Electronically Signed By-Abel Simms MD On:4/14/2023 2:35 PM This report was finalized on [id number] by  Abel Simms MD.              Labs Pending at Discharge:      Procedures Performed           Consults:   Consults     Date and Time Order Name Status Description    4/19/2023  2:49 PM Inpatient Neurology Consult Stroke Completed     4/19/2023  1:54 PM Inpatient Neurology Consult Stroke              Discharge Details        Discharge Medications      ASK your doctor about these medications      Instructions Start Date   acetaminophen 325 MG tablet  Commonly known as: TYLENOL   650 mg, Oral, Every 6 Hours PRN      amLODIPine 5 MG tablet  Commonly known as: NORVASC  Ask about: Which instructions should I use?   5 mg, Oral, Daily      budesonide-formoterol 160-4.5 MCG/ACT inhaler  Commonly known as: SYMBICORT   2 puffs, Inhalation, 2 Times Daily       celecoxib 200 MG capsule  Commonly known as: CeleBREX   200 mg, Oral, Daily      Centrum Silver tablet tablet  Generic drug: multivitamin with minerals   Take 1 tablet by mouth Daily.      cetirizine 10 MG tablet  Commonly known as: zyrTEC   10 mg, Oral, Daily      guaiFENesin 600 MG 12 hr tablet  Commonly known as: MUCINEX   600 mg, Oral, 2 Times Daily      hydroCHLOROthiazide 12.5 MG tablet  Commonly known as: HYDRODIURIL   12.5 mg, Oral, Daily      ibuprofen 200 MG tablet  Commonly known as: ADVIL,MOTRIN   200 mg, Oral, 3 Times Daily PRN      levalbuterol 45 MCG/ACT inhaler  Commonly known as: XOPENEX HFA   Inhale 2 puffs Every 6 (Six) Hours As Needed.      losartan 100 MG tablet  Commonly known as: COZAAR  Ask about: Which instructions should I use?   100 mg, Oral, Daily      montelukast 10 MG tablet  Commonly known as: SINGULAIR   10 mg, Oral, Daily      pantoprazole 40 MG EC tablet  Commonly known as: PROTONIX   40 mg, Oral, Daily      risedronate 150 MG tablet  Commonly known as: ACTONEL   150 mg, Oral, Every 30 Days, with water on empty stomach, nothing by mouth or lie down for next 30 minutes.             Allergies   Allergen Reactions   • Aspirin GI Bleeding   • Crestor [Rosuvastatin Calcium] Myalgia   • Albuterol Sulfate Anxiety   • Atorvastatin Calcium Nausea Only   • Sulfa Antibiotics Nausea Only   • Tolterodine Nausea Only     Detrol         Discharge Disposition: Home      Diet:  Hospital:  Diet Order   Procedures   • Diet: Regular/House Diet; Texture: Regular Texture (IDDSI 7); Fluid Consistency: Thin (IDDSI 0)         Discharge Activity: Ad elvis.        CODE STATUS:  Code Status and Medical Interventions:   Ordered at: 04/19/23 3704     Level Of Support Discussed With:    Patient     Code Status (Patient has no pulse and is not breathing):    CPR (Attempt to Resuscitate)     Medical Interventions (Patient has pulse or is breathing):    Full Support         Future Appointments   Date Time Provider  Department Center   8/28/2023 12:45 PM Natty Hodge MD [facility] None   10/4/2023  8:30 AM Rocio Apodaca APRN [id number]           Time spent on Discharge including face to face service: > 30  minutes

## 2023-04-20 NOTE — PLAN OF CARE
Goal Outcome Evaluation:  Plan of Care Reviewed With: patient        Progress: improving  Outcome Evaluation: Pt is a 78 y/o F admitted with intermittent R UE/LE weakness and tingling. She reports having difficulty with dragging of R foot when walking. She states that numbness has since resolved, but that she experienced a fall into the recliner yesterday, but with no injuries. PMH includes HLD, HTN, RA, and carotid stenosis. Brain MRI revealed no acute infarct, CT head was negative, and CT Angiogram of Head/Neck revealed moderate stenosis of L vertebral artery and mild plaquing in carotid bulbs bilaterally. At baseline, pt is living with spouse in single story home with 3 steps to enter. She was IND prior with household mobility, community ambulation, ADLs, driving, and volunteering at homeless facility. Currently, pt is completing bed mobility IND, transfers IND, and amb 200' CGA>SBA with no AD. Dynamic balance testing also performed today and pt shows no functional deficits. Symptoms have resolved, R Ankle DF strength WFL, and pt shows no dragging of R foot with gait assessment. She will not need PT during stay and will be safe to return home with no additional PT services at d/c.

## 2023-04-20 NOTE — CONSULTS
Primary Care Provider: No ref. provider found     Consult requested by: Angie Arana NP    Reason for Consultation: Neurological evaluation, stroke     History taken from: patient chart    Chief complaint:Right side weakness/numbness        SUBJECTIVE:    History of present illness: Background per H&P:  Cara Castañeda is a 77 y.o. female who presented to [facility] on 4/19/2023 complaining of intermittent right arm and leg weakness and numbness since 7:30 AM yesterday morning.  Patient states she felt normal when she went to bed around 10:30 PM last night but woke up with the symptoms this morning.  She denies any slurred speech facial droop or visual disturbances.  No chest pain or shortness of breath.  No history of stroke in the past.  She is on no blood thinners.  She does report complaints of dizziness as well.     - Portions of the above HPI were copied from previous encounters and edited as appropriate. PMH as detailed below.     Patient is very pleasant 77 year old female in good health and independent of all ADL's. Two days prior, she went to get out of a chair and her right leg was weak. She had to drag her leg to get back to sit down. This improved within a few minutes but then patient had an episode where she bent down and felt that she was unable to stop herself and she fell. She dropped several things with the right had but also felt that this could be due to her arthritis. She denies dizziness, nausea, vomiting, vision changes, numbness/tingling or speech problems.     Patient does not take ASA or a statin at home. She has hx of easily bruising on the ASA. The statin made her nauseous but also had a time when she felt that it caused her one leg to swell. She denies any muscle aches.        Review of Systems   HENT: Negative.    Eyes: Negative for visual disturbance.   Respiratory: Negative.    Cardiovascular: Negative.    Gastrointestinal: Negative for diarrhea, nausea and vomiting.    Endocrine: Negative.    Genitourinary: Negative.    Musculoskeletal: Negative.    Skin: Negative.    Allergic/Immunologic: Negative.    Neurological: Positive for weakness. Negative for dizziness, tremors, seizures, syncope, facial asymmetry, speech difficulty, light-headedness, numbness and headaches.   Hematological: Negative.    Psychiatric/Behavioral: Negative for confusion.          PATIENT HISTORY:  Past Medical History:   Diagnosis Date   • Allergic rhinitis     on immunotherapy- Dr. Benito    • Asthma     Dr. Benito   • Bruit of left carotid artery 02/20/2020   • Carotid stenosis     <50%   • Cold sore    • Gallstone pancreatitis 2006   • GERD (gastroesophageal reflux disease)     Dr. Oliva   • Hiatal hernia     small, sliding    • Hyperlipidemia    • Hypertension    • Left ventricular hypertrophy    • Osteoarthritis     Dr. Avila   • Osteopenia with high risk of fracture    • Pneumonia March, April, May,2022   • Pre-diabetes    • Vocal cord nodule     Dr. Nieves   ,   Past Surgical History:   Procedure Laterality Date   • APPENDECTOMY     • CHOLECYSTECTOMY  05/2006    Dr. Hernandez   • ENDOSCOPY  2017    & Dilation. Dr. Oliva   • ERCP  05/2006    Dr. Oliva   • HYSTERECTOMY  1974    ovaries remain, for benign reasons   • OTHER SURGICAL HISTORY  2009    Stress Echo. No ischemia. EF 60-65%.    • TONSILLECTOMY AND ADENOIDECTOMY     • TOTAL KNEE ARTHROPLASTY  08/31/2017   ,   Family History   Problem Relation Age of Onset   • Hypertension Mother    • Hypertension Father    • Heart disease Father    • Osteoarthritis Father    • Rheum arthritis Brother    • Prostate cancer Paternal Grandfather    • Breast cancer Neg Hx    • Ovarian cancer Neg Hx    ,   Social History     Tobacco Use   • Smoking status: Never     Passive exposure: Never   • Smokeless tobacco: Never   Vaping Use   • Vaping Use: Never used   Substance Use Topics   • Alcohol use: No   • Drug use: No   ,   Prior to Admission medications     Medication Sig Start Date End Date Taking? Authorizing Provider   acetaminophen (TYLENOL) 325 MG tablet Take 2 tablets by mouth Every 6 (Six) Hours As Needed.   Yes ProviderBebo MD   amLODIPine (NORVASC) 5 MG tablet Take 1 tablet by mouth Daily.   Yes ProviderBebo MD   budesonide-formoterol (SYMBICORT) 160-4.5 MCG/ACT inhaler Inhale 2 puffs 2 (Two) Times a Day. 5/5/22  Yes Natty Hodge MD   celecoxib (CeleBREX) 200 MG capsule Take 1 capsule by mouth Daily. 3/16/23  Yes Candida Rosado APRN   cetirizine (zyrTEC) 10 MG tablet Take 1 tablet by mouth Daily.   Yes ProviderBebo MD   guaiFENesin (MUCINEX) 600 MG 12 hr tablet Take 1 tablet by mouth 2 (Two) Times a Day. 1/21/13  Yes Bebo Whiting MD   hydroCHLOROthiazide (HYDRODIURIL) 12.5 MG tablet Take 1 tablet by mouth Daily. 4/4/23  Yes Rocio Apodaca APRN   ibuprofen (ADVIL,MOTRIN) 200 MG tablet Take 1 tablet by mouth 3 (Three) Times a Day As Needed for Mild Pain.   Yes ProviderBebo MD   levalbuterol (XOPENEX HFA) 45 MCG/ACT inhaler Inhale 2 puffs Every 6 (Six) Hours As Needed. 6/6/13  Yes ProviderBebo MD   losartan (COZAAR) 100 MG tablet Take 1 tablet by mouth Daily.   Yes ProviderBebo MD   montelukast (SINGULAIR) 10 MG tablet Take 1 tablet by mouth Daily. 5/31/22  Yes Rocio Apodaca APRN   Multiple Vitamins-Minerals (CENTRUM SILVER) tablet Take 1 tablet by mouth Daily. 2/12/18  Yes Bebo Whiting MD   pantoprazole (PROTONIX) 40 MG EC tablet Take 1 tablet by mouth Daily.   Yes ProviderBebo MD   risedronate (ACTONEL) 150 MG tablet Take 1 tablet by mouth Every 30 (Thirty) Days. with water on empty stomach, nothing by mouth or lie down for next 30 minutes. 5/31/22  Yes Rocio Apodaca APRN    Allergies:  Aspirin, Crestor [rosuvastatin calcium], Albuterol sulfate, Atorvastatin calcium, Sulfa antibiotics, and Tolterodine    Current Facility-Administered Medications   Medication Dose  Route Frequency Provider Last Rate Last Admin   • acetaminophen (TYLENOL) tablet 650 mg  650 mg Oral Q6H PRN Sandrita Haq APRN       • albuterol (PROVENTIL) nebulizer solution 0.083% 2.5 mg/3mL  2.5 mg Nebulization Q6H PRN Sandrita Haq APRN       • amLODIPine (NORVASC) tablet 5 mg  5 mg Oral Daily Darshan Hardwick MD       • budesonide-formoterol (SYMBICORT) 160-4.5 MCG/ACT inhaler 2 puff  2 puff Inhalation BID - RT Sandrita Haq APRN   2 puff at 04/20/23 0655   • celecoxib (CeleBREX) capsule 200 mg  200 mg Oral Daily Sandrita Haq APRN   200 mg at 04/19/23 1640   • cetirizine (zyrTEC) tablet 10 mg  10 mg Oral Daily Sandrita Haq APRN   10 mg at 04/20/23 0825   • clopidogrel (PLAVIX) tablet 75 mg  75 mg Oral Daily Adela Pearson APRN   75 mg at 04/20/23 0824   • Enoxaparin Sodium (LOVENOX) syringe 40 mg  40 mg Subcutaneous Q24H Sandrita Haq APRN   40 mg at 04/19/23 1640   • guaiFENesin (MUCINEX) 12 hr tablet 600 mg  600 mg Oral BID Sandrita Haq APRN   600 mg at 04/20/23 0825   • hydroCHLOROthiazide (HYDRODIURIL) tablet 12.5 mg  12.5 mg Oral Daily Sandrita Haq APRN   12.5 mg at 04/20/23 0824   • ibuprofen (ADVIL,MOTRIN) tablet 200 mg  200 mg Oral TID PRN Sandrita Haq APRN       • losartan (COZAAR) tablet 100 mg  100 mg Oral Daily Darshan Hardwick MD       • montelukast (SINGULAIR) tablet 10 mg  10 mg Oral Nightly Sandrita Haq APRN   10 mg at 04/19/23 2016   • nitroglycerin (NITROSTAT) SL tablet 0.4 mg  0.4 mg Sublingual Q5 Min PRN Sandrita Haq APRN       • ondansetron (ZOFRAN) injection 4 mg  4 mg Intravenous Q6H PRN Sandrita Haq APRN       • pantoprazole (PROTONIX) EC tablet 40 mg  40 mg Oral Daily Sandrita Haq APRN   40 mg at 04/20/23 0824   • Pharmacy to Dose enoxaparin (LOVENOX)   Does not apply Continuous PRN Sandrita Haq APRN       • potassium chloride (K-DUR,KLOR-CON) CR tablet 40 mEq  40 mEq Oral  PRN Sandrita Haq APRN        Or   • potassium chloride (KLOR-CON) packet 40 mEq  40 mEq Oral PRN Sandrita Haq APRN        Or   • potassium chloride 10 mEq in 100 mL IVPB  10 mEq Intravenous Q1H PRN Sandrita Haq APRN       • sodium chloride 0.9 % flush 10 mL  10 mL Intravenous PRN Shayy Alexandre PA       • sodium chloride 0.9 % flush 10 mL  10 mL Intravenous Q12H Sandrita Haq APRN   10 mL at 04/20/23 0825   • sodium chloride 0.9 % flush 10 mL  10 mL Intravenous PRN Sandrita Haq APRN       • sodium chloride 0.9 % infusion 40 mL  40 mL Intravenous PRN Sandrita Haq APRN            ________________________________________________________        OBJECTIVE:    PHYSICAL EXAM:    Constitutional: The patient is in no apparent distress, bright awake and alert. There is no shortness of breath.     PSYCHIATRIC: Mood/affect normal, judgement normal, appropriate    HEENT:   Normocephalic, atraumatic.     Chest: Breathing unlabored    Cardiac: Regular rate and rhythm.     Extremities:  No clubbing, cyanosis or edema.    NEUROLOGICAL:    Cognition:   Fully oriented.  Fund of knowledge excellent.  Concentration and attention normal.   Language normal with normal comprehension, fluent speech, intact repetition and naming.   Short and long term memory appears intact    Cranial nerves;    II - pupils bilaterally equal reacting to light,  No new Visual field deficits;  Fundoscopic exam- Not able to be done, non-dilated exam  III,IV,VI: EOMI with no diplopia  V: Normal facial sensations  VII: No facial asymmetry,  VIII: No New hearing abnormality  IX, X, XI: normal gag and shoulder shrug;  XII: tongue is in the midline.    Sensory:  Intact to light touch in all extremities.     Motor: Strength 5/5 bilaterally upper and lower extremities. No involuntary movements present. Normal tone and bulk.  Deep tendon reflexes: 0/4    Cerebellar: Finger to nose and mirror movements normal  bilaterally.    Gait and balance: Deferred.     Physical exam performed by BEE García.  ________________________________________________________   RESULTS REVIEW:    VITAL SIGNS:   Temp:  [98.1 °F (36.7 °C)-99 °F (37.2 °C)] 98.9 °F (37.2 °C)  Heart Rate:  [] 76  Resp:  [16-24] 18  BP: ()/(55-73) 127/64     LABS:      Lab 04/20/23  0720 04/19/23  1016   WBC 9.30 11.30*   HEMOGLOBIN 11.3* 11.4*   HEMATOCRIT 34.6 35.3   PLATELETS 576* 569*   NEUTROS ABS 7.30* 9.40*   LYMPHS ABS 0.90 0.90   MONOS ABS 0.70 0.80   EOS ABS 0.30 0.20   MCV 80.8 81.7   PROTIME  --  10.3   APTT  --  28.8*         Lab 04/20/23  0720 04/19/23  1016   SODIUM 131* 130*   POTASSIUM 3.8 3.3*   CHLORIDE 96* 93*   CO2 23.0 24.0   ANION GAP 12.0 13.0   BUN 10 8   CREATININE 0.54* 0.53*   EGFR 93.8 94.2   GLUCOSE 94 126*   CALCIUM 9.5 9.7         Lab 04/19/23  1016   TOTAL PROTEIN 6.5   ALBUMIN 3.7   GLOBULIN 2.8   ALT (SGPT) 10   AST (SGOT) 12   BILIRUBIN 0.2   ALK PHOS 96         Lab 04/19/23  1235 04/19/23  1016   HSTROP T 9 8   PROTIME  --  10.3   INR  --  0.96             Lab 04/19/23  1016   ABO TYPING O   RH TYPING Positive   ANTIBODY SCREEN Negative             Lab Results   Component Value Date    TSH 2.930 03/31/2023     (H) 03/31/2023    HGBA1C 6.0 (H) 03/31/2023    [id number] 761 02/03/2023       IMAGING STUDIES:  CT Head Without Contrast    Result Date: 4/19/2023  Impression: 1. No acute intracranial finding. 2. Mild atrophy. Electronically Signed: Rocio Penn  4/19/2023 12:01 PM EDT  Workstation ID: [id number]    CT Angiogram Neck    Result Date: 4/19/2023  Impression: 1. Mild plaquing in the carotid bulbs bilaterally but no hemodynamically significant stenosis. 2. Moderate stenosis of the left vertebral artery shortly after the origin of the left PICA. 3. The right CARMENCITA is extremely small in caliber throughout its course and may occlude distally. This may be congenital, given that there is no evidence of  infarct. Electronically Signed: Berny Choi  4/19/2023 12:16 PM EDT  Workstation ID: [id number]    MRI Brain Without Contrast    Result Date: 4/19/2023  1. No acute stroke. 2. Mild changes chronic microvascular ischemic disease. Electronically Signed: Berny Choi  4/19/2023 3:51 PM EDT  Workstation ID: [id number]    XR Chest 1 View    Result Date: 4/19/2023  Impression: Bilateral basilar atelectasis left greater than right. Electronically Signed: Gabriel Duffy  4/19/2023 10:45 AM EDT  Workstation ID: [id number]    CT Angiogram Head w AI Analysis of LVO    Result Date: 4/19/2023  Impression: 1. Mild plaquing in the carotid bulbs bilaterally but no hemodynamically significant stenosis. 2. Moderate stenosis of the left vertebral artery shortly after the origin of the left PICA. 3. The right CARMENCITA is extremely small in caliber throughout its course and may occlude distally. This may be congenital, given that there is no evidence of infarct. Electronically Signed: Berny Choi  4/19/2023 12:16 PM EDT  Workstation ID: [id number]      I reviewed the patient's new clinical results.    ________________________________________________________     PROBLEM LIST:    Right sided weakness            ASSESSMENT/PLAN:    1.  Right-sided weakness, resolved. Likely TIA. MRI brain is negative for acute findings.   - MRI brain: Negative for acute findings.  There is chronic small vessel ischemic disease.  - CTA head and neck: Mild plaque in the carotid bulbs without significant stenosis, moderate stenosis of the left vertebral artery and left PICA, congenital right CARMENCITA small caliber/   - Echo: Pending    - EKG: Sinus rhythm, rate 88  - Labs: A1C: 6.0, B12: 1,004, LDL: 117, TSH: 2.930  - Antithrombotics: Highly recommend DAPT for 14-21 days and then continuing with baby ASA. Patient is refusing two drugs because she have bruising. She agrees to continue ASA.   - Statin: Pt does not have a true allergy to a statin. Discussed starting Crestor low  dose back every other day and see if she can tolerate. If she can, increase to every day and eventually increase dose to 20   - PT/OT/ST as appropriate, Neuro checks per protocol, DVT prophylaxis, Stroke education    Hypertension  - Continue home medications  - BP goal 130/90, avoid hypotension    Hyperlipidemia  - dietary modifications    Modification of stroke risk factors:   - Blood pressure should be less than 130/80 outpatient, HbA1c less than 6.5, LDL less than 70; b12>500 and smoking cessation if applicable. We would be grateful if the primary team / primary care physician would keep a close watch on the above targets.  - Stroke education  - Follow up with stroke clinic    Plan:   1. 2d Echo prior to discharge- if not read, it can be followed up with outpatient   2. Start ASA 81 mg daily and low dose statin every other day if able to tolerate  3. Follow up with stroke clinic (will contact patient with appointment)       I discussed the patient's findings and my recommendations with patient and nursing staff    AGNES Estrada  04/20/23  09:29 EDT

## 2023-04-20 NOTE — PLAN OF CARE
Goal Outcome Evaluation:  Plan of Care Reviewed With: patient        Progress: no change  Outcome Evaluation: Pt is a 78 y/o F admitted with intermittent R UE/LE weakness and tingling. She reports having difficulty with dragging of R foot when walking. She states that numbness has since resolved, but that she experienced a fall into the recliner yesterday, but with no injuries. PMH includes HLD, HTN, RA, and carotid stenosis. Brain MRI revealed no acute infarct, CT head was negative, and CT Angiogram of Head/Neck revealed moderate stenosis of L vertebral artery and mild plaquing in carotid bulbs bilaterally. Pt. lives at home w/ spouse at baseline, maintains I/ADL independence and still working. Pt. completes all functional transfers and ADLs unassisted this date, appears to be at baseline funcitonal independence. Pt. w/ c/o R hand sesnations changes since janurary, reports arthritis BUEs and has appt to see rheumatologist. Pt. (-) median nerve compression test. Pt. would benefit from OPOT to address strength/ROM deficits and modify ADLs for improve independence. Pt. does not require futhur skilled IP OT.

## 2023-04-20 NOTE — PLAN OF CARE
Goal Outcome Evaluation:           Problem: Adult Inpatient Plan of Care  Goal: Plan of Care Review  Outcome: Ongoing, Progressing  Goal: Patient-Specific Goal (Individualized)  Outcome: Ongoing, Progressing  Goal: Absence of Hospital-Acquired Illness or Injury  Outcome: Ongoing, Progressing  Intervention: Identify and Manage Fall Risk  Recent Flowsheet Documentation  Taken 4/20/2023 1400 by Sintia Rodriguez RN  Safety Promotion/Fall Prevention:   activity supervised   assistive device/personal items within reach   clutter free environment maintained   safety round/check completed   room organization consistent   nonskid shoes/slippers when out of bed   lighting adjusted   fall prevention program maintained  Taken 4/20/2023 1200 by Sintia Rodriguez RN  Safety Promotion/Fall Prevention:   activity supervised   assistive device/personal items within reach   clutter free environment maintained   safety round/check completed   room organization consistent   nonskid shoes/slippers when out of bed   lighting adjusted   fall prevention program maintained  Intervention: Prevent and Manage VTE (Venous Thromboembolism) Risk  Recent Flowsheet Documentation  Taken 4/20/2023 1200 by Sintia Rodriguez RN  VTE Prevention/Management:   bilateral   sequential compression devices off   patient refused intervention  Intervention: Prevent Infection  Recent Flowsheet Documentation  Taken 4/20/2023 1400 by Sintia Rodriguez RN  Infection Prevention:   single patient room provided   rest/sleep promoted   personal protective equipment utilized   hand hygiene promoted   equipment surfaces disinfected  Taken 4/20/2023 1200 by Sintia Rodriguez RN  Infection Prevention:   single patient room provided   rest/sleep promoted   personal protective equipment utilized   hand hygiene promoted   equipment surfaces disinfected  Goal: Optimal Comfort and Wellbeing  Outcome: Ongoing, Progressing  Intervention: Provide Person-Centered Care  Recent  Flowsheet Documentation  Taken 4/20/2023 1200 by Sintia Rodriguez RN  Trust Relationship/Rapport:   care explained   choices provided  Goal: Readiness for Transition of Care  Outcome: Ongoing, Progressing     Problem: Adjustment to Illness (Stroke, Ischemic/Transient Ischemic Attack)  Goal: Optimal Coping  Outcome: Ongoing, Progressing     Problem: Bowel Elimination Impaired (Stroke, Ischemic/Transient Ischemic Attack)  Goal: Effective Bowel Elimination  Outcome: Ongoing, Progressing     Problem: Cerebral Tissue Perfusion (Stroke, Ischemic/Transient Ischemic Attack)  Goal: Optimal Cerebral Tissue Perfusion  Outcome: Ongoing, Progressing     Problem: Cognitive Impairment (Stroke, Ischemic/Transient Ischemic Attack)  Goal: Optimal Cognitive Function  Outcome: Ongoing, Progressing     Problem: Communication Impairment (Stroke, Ischemic/Transient Ischemic Attack)  Goal: Improved Communication Skills  Outcome: Ongoing, Progressing     Problem: Functional Ability Impaired (Stroke, Ischemic/Transient Ischemic Attack)  Goal: Optimal Functional Ability  Outcome: Ongoing, Progressing     Problem: Respiratory Compromise (Stroke, Ischemic/Transient Ischemic Attack)  Goal: Effective Oxygenation and Ventilation  Outcome: Ongoing, Progressing     Problem: Sensorimotor Impairment (Stroke, Ischemic/Transient Ischemic Attack)  Goal: Improved Sensorimotor Function  Outcome: Ongoing, Progressing     Problem: Swallowing Impairment (Stroke, Ischemic/Transient Ischemic Attack)  Goal: Optimal Eating and Swallowing without Aspiration  Outcome: Ongoing, Progressing     Problem: Urinary Elimination Impaired (Stroke, Ischemic/Transient Ischemic Attack)  Goal: Effective Urinary Elimination  Outcome: Ongoing, Progressing     Problem: Pain Acute  Goal: Acceptable Pain Control and Functional Ability  Outcome: Ongoing, Progressing  Intervention: Prevent or Manage Pain  Recent Flowsheet Documentation  Taken 4/20/2023 1400 by Sintia Rodriguez,  RN  Medication Review/Management: medications reviewed  Taken 4/20/2023 1200 by Sintia Rodriguez RN  Medication Review/Management: medications reviewed     Problem: Fall Injury Risk  Goal: Absence of Fall and Fall-Related Injury  Outcome: Ongoing, Progressing  Intervention: Identify and Manage Contributors  Recent Flowsheet Documentation  Taken 4/20/2023 1400 by Sintia Rodriguez RN  Medication Review/Management: medications reviewed  Taken 4/20/2023 1200 by Sintia Rodriguez RN  Medication Review/Management: medications reviewed  Intervention: Promote Injury-Free Environment  Recent Flowsheet Documentation  Taken 4/20/2023 1400 by Sintia Rodriguez RN  Safety Promotion/Fall Prevention:   activity supervised   assistive device/personal items within reach   clutter free environment maintained   safety round/check completed   room organization consistent   nonskid shoes/slippers when out of bed   lighting adjusted   fall prevention program maintained  Taken 4/20/2023 1200 by Sintia Rodriguez RN  Safety Promotion/Fall Prevention:   activity supervised   assistive device/personal items within reach   clutter free environment maintained   safety round/check completed   room organization consistent   nonskid shoes/slippers when out of bed   lighting adjusted   fall prevention program maintained

## 2023-04-21 NOTE — CASE MANAGEMENT/SOCIAL WORK
Case Management Discharge Note      Final Note: Routine Home         Selected Continued Care - Discharged on 4/20/2023 Admission date: 4/19/2023 - Discharge disposition: Home or Self Care         Transportation Services  Private: Car    Final Discharge Disposition Code: 01 - home or self-care

## 2023-04-26 ENCOUNTER — OFFICE VISIT (OUTPATIENT)
Dept: FAMILY MEDICINE CLINIC | Facility: CLINIC | Age: 78
End: 2023-04-26
Payer: MEDICARE

## 2023-04-26 VITALS
TEMPERATURE: 97.2 F | RESPIRATION RATE: 16 BRPM | HEART RATE: 87 BPM | WEIGHT: 134 LBS | SYSTOLIC BLOOD PRESSURE: 174 MMHG | BODY MASS INDEX: 24.66 KG/M2 | DIASTOLIC BLOOD PRESSURE: 78 MMHG | HEIGHT: 62 IN | OXYGEN SATURATION: 98 %

## 2023-04-26 DIAGNOSIS — I10 BENIGN HYPERTENSION: ICD-10-CM

## 2023-04-26 DIAGNOSIS — G45.9 TIA (TRANSIENT ISCHEMIC ATTACK): Primary | ICD-10-CM

## 2023-04-26 DIAGNOSIS — M15.9 GENERALIZED OSTEOARTHRITIS: ICD-10-CM

## 2023-04-26 DIAGNOSIS — R73.03 PREDIABETES: ICD-10-CM

## 2023-04-26 DIAGNOSIS — E78.2 MIXED HYPERLIPIDEMIA: ICD-10-CM

## 2023-04-26 DIAGNOSIS — I34.81 MITRAL ANNULAR CALCIFICATION: ICD-10-CM

## 2023-04-26 PROBLEM — E53.8 VITAMIN B12 DEFICIENCY: Status: RESOLVED | Noted: 2019-02-08 | Resolved: 2023-04-26

## 2023-04-26 RX ORDER — AMLODIPINE BESYLATE 10 MG/1
10 TABLET ORAL DAILY
Qty: 90 TABLET | Refills: 0 | Status: SHIPPED | OUTPATIENT
Start: 2023-04-26

## 2023-04-26 NOTE — PROGRESS NOTES
Transitional Care Follow Up Visit  Subjective     Cara Castañeda is a 77 y.o. female who presents for a transitional care management visit.    Within 48 business hours after discharge our office contacted her via telephone to coordinate her care and needs.      I reviewed and discussed the details of that call along with the discharge summary, hospital problems, inpatient lab results, inpatient diagnostic studies, and consultation reports with Cara.     Current outpatient and discharge medications have been reconciled for the patient.  Reviewed by: AGNES Zhao           View : No data to display.              Risk for Readmission (LACE) Score: 3 (4/20/2023  6:00 AM)      History of Present Illness   Course During Hospital Stay: Was admitted to [facility] on 4/19/2023 through the emergency room.  She called our office and was experiencing weakness on the left side and imbalance.  She also had a mild amount of dizziness.  She had no speech difficulties no chest pain no shortness of breath no headache.  She was hospitalized and had a CT scan and CTA as well as MRI of the brain.  She was eventually diagnosed with a TIA and all symptoms resolved she was ruled out for acute stroke.  While she was admitted she was started on aspirin daily.  We are going over echocardiogram today.  Discharge summaries and all of her labs have been reviewed.  Her blood pressure is running higher today.  She did have a somewhat low sodium after having had a decrease in hydrochlorothiazide a while back.  She is compliant with all of her medication.  Patient was discharged on 4/20.  Reviewed test today.  Also reviewed medications and reconciled    Most recently she has had some rheumatological issues with joint pain and is seeing a rheumatologist tomorrow.      The following portions of the patient's history were reviewed and updated as appropriate: allergies, current medications, past family history, past medical  history, past social history, past surgical history and problem list.    Review of Systems   Constitutional: Negative for fever.   Respiratory: Negative for cough and shortness of breath.    Cardiovascular: Negative for chest pain and leg swelling.   Gastrointestinal: Negative for abdominal pain.   Musculoskeletal: Positive for arthralgias and joint swelling.   Skin: Negative for rash.       Objective   Physical Exam  Vitals and nursing note reviewed.   Constitutional:       Appearance: She is well-developed.   Cardiovascular:      Rate and Rhythm: Normal rate and regular rhythm.      Heart sounds: Normal heart sounds. No murmur heard.    No friction rub. No gallop.   Pulmonary:      Effort: Pulmonary effort is normal.      Breath sounds: Normal breath sounds. No wheezing or rales.   Abdominal:      General: Bowel sounds are normal.      Palpations: Abdomen is soft.      Tenderness: There is no abdominal tenderness.   Musculoskeletal:         General: Deformity present.      Comments: Patient has arthritic deformities of multiple joints in her hands.  No erythema today.  Elbows full range of motion no erythema.  Generalized tenderness in hands.    Ambulating without problem.  Left knee with some joint tenderness and swelling.   Skin:     General: Skin is warm and dry.   Neurological:      General: No focal deficit present.      Mental Status: She is alert.      Cranial Nerves: Cranial nerves 2-12 are intact. No cranial nerve deficit.      Sensory: No sensory deficit.      Motor: No weakness.      Coordination: Coordination is intact.      Gait: Gait normal.      Deep Tendon Reflexes:      Reflex Scores:       Patellar reflexes are 1+ on the right side and 1+ on the left side.       Achilles reflexes are 1+ on the right side and 1+ on the left side.  Psychiatric:         Mood and Affect: Mood normal.         Behavior: Behavior normal.         Thought Content: Thought content normal.         Assessment & Plan    Diagnoses and all orders for this visit:    1. TIA (transient ischemic attack) (Primary)  Assessment & Plan:  Continue aspirin and risk management      2. Mitral annular calcification    3. Mixed hyperlipidemia  Assessment & Plan:  Lipid abnormalities are improving with treatment.  Pharmacotherapy as ordered.  Lipids will be reassessed in 6 months.      4. Benign hypertension  Assessment & Plan:  Hypertension is worsening.  Medication changes per orders.  Blood pressure will be reassessed at the next regular appointment.      5. Prediabetes  Assessment & Plan:  Continue to monitor      6. Generalized osteoarthritis  Assessment & Plan:  Rheumatology starting tomorrow.  Discontinue Celebrex.  Not helpful      Other orders  -     amLODIPine (NORVASC) 10 MG tablet; Take 1 tablet by mouth Daily.  Dispense: 90 tablet; Refill: 0

## 2023-05-01 ENCOUNTER — TELEPHONE (OUTPATIENT)
Dept: FAMILY MEDICINE CLINIC | Facility: CLINIC | Age: 78
End: 2023-05-01
Payer: MEDICARE

## 2023-05-01 NOTE — TELEPHONE ENCOUNTER
Both the steroids and the amlodipine can cause her to have some swelling.  The blood pressure medicine change will take some time.  I have her coming in for recheck on that in a few weeks.  If it is excessively high I am happy to see her.  But the amlodipine may still take some time

## 2023-05-01 NOTE — TELEPHONE ENCOUNTER
Patient states her blood pressure meds were changed when she was in recently and states that her feet and legs are swelling. Also stated that her blood pressure is higher than before the medication change. Patient also wanted to advise that she is on a steroid from another specialist and she is not sure if that is causing this reaction. Advise?

## 2023-05-02 DIAGNOSIS — J45.30 MILD PERSISTENT ASTHMA WITHOUT COMPLICATION: Primary | ICD-10-CM

## 2023-05-02 RX ORDER — BUDESONIDE AND FORMOTEROL FUMARATE DIHYDRATE 160; 4.5 UG/1; UG/1
2 AEROSOL RESPIRATORY (INHALATION) 2 TIMES DAILY
Qty: 1 EACH | Refills: 11 | Status: SHIPPED | OUTPATIENT
Start: 2023-05-02

## 2023-05-02 RX ORDER — MONTELUKAST SODIUM 10 MG/1
TABLET ORAL
Qty: 90 TABLET | Refills: 1 | Status: SHIPPED | OUTPATIENT
Start: 2023-05-02

## 2023-05-02 RX ORDER — RISEDRONATE SODIUM 150 MG/1
TABLET, FILM COATED ORAL
Qty: 3 TABLET | Refills: 1 | Status: SHIPPED | OUTPATIENT
Start: 2023-05-02

## 2023-05-12 NOTE — PROGRESS NOTES
Subjective: H/O tia     Patient ID: Cara Castañeda is a 77 y.o. female.    CHIEF COMPLAINT: History of TIA    History of Present Illness   Mrs Castañeda is a very pleasant 77-year-old  female who presented alone for a follow-up after having a TIA.  She was referred by [facility] for TIA follow-up.  The patient states that she awoke in approximately 7:30 AM on 4-19 and had made some coffee tried to get a drink of coffee and her right arm would not come to her mouth but went away from her mouth.  She states she was also dragging her right leg.  She proceeded to [facility] and had a complete work-up.  Her symptoms went away very quickly.  She had negative scans and was placed on Crestor and 81 mg baby aspirin for long-term prevention as well as healthy heart diet.          Brief Hospital course: 77 female pleasant female with known history of hypertension, gallstone pancreatitis  HLD, and DJD.  Patient was admitted on 4/19/2023, presented to [facility] ED complaining of right upper and lower extremities weakness with numbness that started at around 7:30 AM.  Patient denies any dysphagia, no dysarthria or speech impediment.  No chest pain, shortness of breath, dizziness, headache or lightheadedness.  CT of the head without contrast (4/19), showed no acute intracranial finding and follow-up CTA of the head and neck revealed mild plaque in the carotid bulb bilaterally but no hemodynamically significant stenosis.  MRI of the brain (4/19) showed no acute stroke with mild chronic microvascular ischemic disease changes.  Patient was admitted for right upper extremities weakness concerning for TIA.  So far, she has ruled out for acute ischemic stroke.  She was seen in consultation by neurologist and continue on antiplatelet with aspirin and statin therapy.  2D echo (4/20) with result pending.  Patient brief hospital course was uneventful.  Right upper extremities weakness have resolved.  She is up and  ambulating in the room without any difficulties.            Testing: Patient    MRI BRAIN WO CONTRAST   Date of Exam: 4/19/2023 3:20 PM EDT   Indication: Transient ischemic attack (TIA).   Comparison: None available.   Technique:  Routine multiplanar/multisequence sequence images of the brain were obtained without contrast administration.    Findings:  Motion artifact limits evaluation. No diffusion weighted abnormalities are identified. There is minimal periventricular T2 prolongation suggestive of mild changes of chronic microvascular ischemic disease. There is a focal area of T2 prolongation within   the left frontal lobe, likely secondary to changes of chronic microvascular ischemic disease. No evidence of hemorrhage is identified. The pituitary is normal. Orbits and paranasal sinuses are unremarkable. Trace mastoid effusions   IMPRESSION:  1. No acute stroke.  2. Mild changes chronic microvascular ischemic disease.   Electronically Signed: Berny Choi    4/19/2023 3:51 PM EDT       CT ANGIOGRAM NECK, CT ANGIOGRAM HEAD W AI ANALYSIS OF LVO   Date of Exam: 4/19/2023 11:40 AM EDT   Indication: right sided weakness/dizziness.   Comparison: None available.    IMPRESSION:  Impression:   1. Mild plaquing in the carotid bulbs bilaterally but no hemodynamically significant stenosis.  2. Moderate stenosis of the left vertebral artery shortly after the origin of the left PICA.  3. The right CARMENCITA is extremely small in caliber throughout its course and may occlude distally. This may be congenital, given that there is no evidence of infarct.   Electronically Signed: Berny Choi    4/19/2023 12:16 PM EDT       CT ANGIOGRAM NECK, CT ANGIOGRAM HEAD W AI ANALYSIS OF LVO   Date of Exam: 4/19/2023 11:40 AM EDT   Indication: right sided weakness/dizziness.   Comparison: None available.   Impression:   1. Mild plaquing in the carotid bulbs bilaterally but no hemodynamically significant stenosis.  2. Moderate stenosis of the left  vertebral artery shortly after the origin of the left PICA.  3. The right CARMENCITA is extremely small in caliber throughout its course and may occlude distally. This may be congenital, given that there is no evidence of infarct.     Electronically Signed: Berny Choi    4/19/2023 12:16 PM EDT       CT HEAD WO CONTRAST   Date of Exam: 4/19/2023 11:40 AM EDT   Indication: right side weakness and numbness.   Comparison: None available.   IMPRESSION:  Impression:   1. No acute intracranial finding.  2. Mild atrophy.    Electronically Signed: Rocio Penn    4/19/2023 12:01 PM EDT     DALE  Interpretation Summary   •  Left ventricular systolic function is normal. Estimated left ventricular EF = 67%  •  Left ventricular wall thickness is consistent with mild to moderate concentric hypertrophy.  •  Left ventricular diastolic function is consistent with (grade Ia w/high LAP) impaired relaxation and age.   Transthoracic echocardiography reveals mild to moderate LVH with EF of 67% with diastolic dysfunction.  Moderate left atrial enlargement.  Bubble study without evidence of any shunt.  No effusion  Electronically signed by Dada Strickland MD, 04/20/23, 8:51 PM EDT.             The following portions of the patient's history were reviewed and updated as appropriate: allergies, current medications, past family history, past medical history, past social history, past surgical history and problem list.      Family History   Problem Relation Age of Onset   • Hypertension Mother    • Hypertension Father    • Heart disease Father    • Osteoarthritis Father    • Rheum arthritis Brother    • Prostate cancer Paternal Grandfather    • Breast cancer Neg Hx    • Ovarian cancer Neg Hx        Past Medical History:   Diagnosis Date   • Allergic rhinitis     on immunotherapy- Dr. Benito    • Asthma     Dr. Benito   • Bruit of left carotid artery 02/20/2020   • Carotid stenosis     <50%   • Cold sore    • Gallstone pancreatitis 2006   • GERD  (gastroesophageal reflux disease)     Dr. Oliva   • Hiatal hernia     small, sliding    • Hyperlipidemia    • Hypertension    • Left ventricular hypertrophy    • Osteoarthritis     Dr. Avila   • Osteopenia with high risk of fracture    • Pneumonia March, April, May,2022   • Pre-diabetes    • Vocal cord nodule     Dr. Nieves       Social History     Socioeconomic History   • Marital status:    Tobacco Use   • Smoking status: Never     Passive exposure: Never   • Smokeless tobacco: Never   Vaping Use   • Vaping Use: Never used   Substance and Sexual Activity   • Alcohol use: No   • Drug use: No   • Sexual activity: Not Currently     Partners: Male         Current Outpatient Medications:   •  acetaminophen (TYLENOL) 325 MG tablet, Take 2 tablets by mouth Every 6 (Six) Hours As Needed., Disp: , Rfl:   •  amLODIPine (NORVASC) 10 MG tablet, Take 1 tablet by mouth Daily., Disp: 90 tablet, Rfl: 0  •  aspirin 81 MG EC tablet, Take 1 tablet by mouth Daily., Disp: 100 tablet, Rfl: 0  •  budesonide-formoterol (SYMBICORT) 160-4.5 MCG/ACT inhaler, Inhale 2 puffs 2 (Two) Times a Day., Disp: 1 each, Rfl: 11  •  cetirizine (zyrTEC) 10 MG tablet, Take 1 tablet by mouth Daily., Disp: , Rfl:   •  guaiFENesin (MUCINEX) 600 MG 12 hr tablet, Take 1 tablet by mouth 2 (Two) Times a Day., Disp: , Rfl:   •  levalbuterol (XOPENEX HFA) 45 MCG/ACT inhaler, Inhale 2 puffs Every 6 (Six) Hours As Needed., Disp: , Rfl:   •  losartan (COZAAR) 100 MG tablet, Take 1 tablet by mouth Daily., Disp: , Rfl:   •  montelukast (SINGULAIR) 10 MG tablet, TAKE ONE TABLET BY MOUTH ONCE DAILY, Disp: 90 tablet, Rfl: 1  •  pantoprazole (PROTONIX) 40 MG EC tablet, Take 1 tablet by mouth Daily., Disp: , Rfl:   •  risedronate (ACTONEL) 150 MG tablet, TAKE ONE TABLET BY MOUTH EVERY 30 DAYS AS DIRECTED ----TAKE WITH WATER ON AN EMPTY STOMACH AND NOTHING BY MOUTH OR LYING FOWN FOR THE NEXT 30 MINUTES ------, Disp: 3 tablet, Rfl: 1  •  rosuvastatin (CRESTOR)  5 MG tablet, Take 1 tablet by mouth Every Other Day., Disp: 90 tablet, Rfl: 0  •  hydroxychloroquine (PLAQUENIL) 200 MG tablet, Take  by mouth 2 (Two) Times a Day., Disp: , Rfl:     Review of Systems   Constitutional: Positive for fatigue.   HENT: Positive for nosebleeds and postnasal drip.    Eyes: Positive for itching.   Respiratory: Positive for cough and shortness of breath.    Cardiovascular: Positive for leg swelling.   Endocrine: Positive for cold intolerance.   Genitourinary: Positive for frequency and urgency.   Musculoskeletal: Positive for arthralgias and joint swelling.   Allergic/Immunologic: Positive for environmental allergies.   Neurological: Positive for tremors.   All other systems reviewed and are negative.       I have reviewed ROS completed by medical assistant.     Objective:    Neurologic Exam     Mental Status   Oriented to person, place, and time.   Speech: speech is normal     Cranial Nerves   Cranial nerves II through XII intact.     CN III, IV, VI   Pupils are equal, round, and reactive to light.    Gait, Coordination, and Reflexes     Gait  Gait: normal    Reflexes   Right brachioradialis: 2+  Left brachioradialis: 2+  Right biceps: 2+  Left biceps: 2+  Right triceps: 2+  Left triceps: 2+  Right patellar: 2+  Left patellar: 2+  Right achilles: 2+  Left achilles: 2+      Physical Exam  Vitals reviewed.   Constitutional:       Appearance: Normal appearance. She is well-developed and normal weight.   HENT:      Head: Normocephalic and atraumatic.      Right Ear: Hearing normal.      Left Ear: Hearing normal.      Nose: Nose normal.      Mouth/Throat:      Lips: Pink.      Mouth: Mucous membranes are moist.   Eyes:      General: Lids are normal.      Pupils: Pupils are equal, round, and reactive to light.   Cardiovascular:      Rate and Rhythm: Normal rate and regular rhythm.      Pulses: Normal pulses.      Heart sounds: Normal heart sounds, S1 normal and S2 normal.   Pulmonary:       "Effort: Pulmonary effort is normal.      Breath sounds: Normal breath sounds.   Abdominal:      General: Abdomen is flat. Bowel sounds are normal.   Musculoskeletal:         General: Normal range of motion.      Cervical back: Full passive range of motion without pain and normal range of motion.      Comments: BUE/BLE 5/5 including  and dorsiflexion and plantar flection of feet   Skin:     General: Skin is warm and dry.   Neurological:      General: No focal deficit present.      Mental Status: She is alert and oriented to person, place, and time.      Cranial Nerves: Cranial nerves 2-12 are intact.      Sensory: Sensation is intact.      Motor: Motor function is intact.      Coordination: Coordination is intact.      Gait: Gait is intact.      Deep Tendon Reflexes: Babinski sign absent on the right side. Babinski sign absent on the left side.      Reflex Scores:       Tricep reflexes are 2+ on the right side and 2+ on the left side.       Bicep reflexes are 2+ on the right side and 2+ on the left side.       Brachioradialis reflexes are 2+ on the right side and 2+ on the left side.       Patellar reflexes are 2+ on the right side and 2+ on the left side.       Achilles reflexes are 2+ on the right side and 2+ on the left side.  Psychiatric:         Attention and Perception: Attention and perception normal.         Mood and Affect: Mood normal.         Speech: Speech normal.         Behavior: Behavior is cooperative.         Assessment/Plan:    The patient  was educated on the Stroke Acronym \"BE FAST\" B=Balance issues, E=Vision changes, F=Face weakness or numbness, A=Arm or Leg weakness or numbness, S=Speech problems and T=Time to call, 911 she voiced understanding.   She was also given a flyer outlining be fast.  Diagnoses and all orders for this visit:    1. H/O TIA (transient ischemic attack) and stroke (Primary)    PCP to control: BP<130/90, A1c<6.5, LDL<70, B12>500, control weight, exercise 20 min TIW, " continue anticoagulant, continue statin, healthy heart diet.  sleep referral or cardiac referral,     Return if symptoms worsen or fail to improve.    I spent 40 minutes caring for Cara on this date of service. This time includes time spent by me in the following activities: reviewing tests, obtaining and/or reviewing a separately obtained history, performing a medically appropriate examination and/or evaluation, counseling and educating the patient/family/caregiver, referring and communicating with other health care professionals and documenting information in the medical record.      This document has been electronically signed by Jessica GAMBOA on May 22, 2023 09:32 EDT

## 2023-05-22 ENCOUNTER — OFFICE VISIT (OUTPATIENT)
Dept: NEUROLOGY | Facility: CLINIC | Age: 78
End: 2023-05-22
Payer: MEDICARE

## 2023-05-22 VITALS
SYSTOLIC BLOOD PRESSURE: 144 MMHG | HEART RATE: 96 BPM | WEIGHT: 135 LBS | HEIGHT: 62 IN | BODY MASS INDEX: 24.84 KG/M2 | DIASTOLIC BLOOD PRESSURE: 71 MMHG

## 2023-05-22 DIAGNOSIS — Z86.73 H/O TIA (TRANSIENT ISCHEMIC ATTACK) AND STROKE: Primary | ICD-10-CM

## 2023-05-22 RX ORDER — HYDROXYCHLOROQUINE SULFATE 200 MG/1
TABLET, FILM COATED ORAL 2 TIMES DAILY
COMMUNITY

## 2023-05-24 ENCOUNTER — OFFICE VISIT (OUTPATIENT)
Dept: FAMILY MEDICINE CLINIC | Facility: CLINIC | Age: 78
End: 2023-05-24
Payer: MEDICARE

## 2023-05-24 VITALS
SYSTOLIC BLOOD PRESSURE: 158 MMHG | HEART RATE: 91 BPM | WEIGHT: 135 LBS | HEIGHT: 62 IN | RESPIRATION RATE: 16 BRPM | BODY MASS INDEX: 24.84 KG/M2 | DIASTOLIC BLOOD PRESSURE: 70 MMHG | OXYGEN SATURATION: 98 % | TEMPERATURE: 97.8 F

## 2023-05-24 DIAGNOSIS — M25.50 POLYARTHRALGIA: ICD-10-CM

## 2023-05-24 DIAGNOSIS — I10 BENIGN HYPERTENSION: Primary | ICD-10-CM

## 2023-05-24 RX ORDER — DILTIAZEM HYDROCHLORIDE 120 MG/1
120 CAPSULE, COATED, EXTENDED RELEASE ORAL DAILY
Qty: 30 CAPSULE | Refills: 1 | Status: SHIPPED | OUTPATIENT
Start: 2023-05-24

## 2023-05-24 NOTE — PROGRESS NOTES
Chief Complaint  Hypertension and Transient Ischemic Attack (Follow up)    Subjective          Cara Castañeda presents to [facility] for follow-up hypertension and TIA    History of Present Illness    Patient is here to follow-up on hypertension.  She was increased on amlodipine the last time she was here.  Trying to reach goal blood pressure secondary to history of TIA recently.  She has seen the rheumatologist and is still on prednisone.  She will be weaning off prednisone soon.  She was started on Plaquenil and is starting to have decrease in inflammation and pain in her joints.    Patient is also seen neurology and is stable.  Still not at goal for blood pressure with side effects of edema in legs and ankles patient would like to be off amlodipine.    Cara Castañeda  has a past medical history of Allergic rhinitis, Asthma, Bruit of left carotid artery (02/20/2020), Carotid stenosis, Cold sore, Gallstone pancreatitis (2006), GERD (gastroesophageal reflux disease), Hiatal hernia, Hyperlipidemia, Hypertension, Left ventricular hypertrophy, Osteoarthritis, Osteopenia with high risk of fracture, Pneumonia (March, April, May,2022), Pre-diabetes, and Vocal cord nodule.      Review of Systems   Constitutional: Negative for fever.   Respiratory: Negative for cough and shortness of breath.    Cardiovascular: Positive for leg swelling. Negative for chest pain.   Gastrointestinal: Negative for abdominal pain.   Musculoskeletal: Positive for arthralgias and joint swelling.   Skin: Negative for rash.        Objective       Current Outpatient Medications:   •  acetaminophen (TYLENOL) 325 MG tablet, Take 2 tablets by mouth Every 6 (Six) Hours As Needed., Disp: , Rfl:   •  aspirin 81 MG EC tablet, Take 1 tablet by mouth Daily., Disp: 100 tablet, Rfl: 0  •  budesonide-formoterol (SYMBICORT) 160-4.5 MCG/ACT inhaler, Inhale 2 puffs 2 (Two) Times a Day., Disp: 1 each, Rfl: 11  •  cetirizine  "(zyrTEC) 10 MG tablet, Take 1 tablet by mouth Daily., Disp: , Rfl:   •  guaiFENesin (MUCINEX) 600 MG 12 hr tablet, Take 1 tablet by mouth 2 (Two) Times a Day., Disp: , Rfl:   •  hydroxychloroquine (PLAQUENIL) 200 MG tablet, Take  by mouth 2 (Two) Times a Day., Disp: , Rfl:   •  levalbuterol (XOPENEX HFA) 45 MCG/ACT inhaler, Inhale 2 puffs Every 6 (Six) Hours As Needed., Disp: , Rfl:   •  losartan (COZAAR) 100 MG tablet, Take 1 tablet by mouth Daily., Disp: , Rfl:   •  montelukast (SINGULAIR) 10 MG tablet, TAKE ONE TABLET BY MOUTH ONCE DAILY, Disp: 90 tablet, Rfl: 1  •  pantoprazole (PROTONIX) 40 MG EC tablet, Take 1 tablet by mouth Daily., Disp: , Rfl:   •  risedronate (ACTONEL) 150 MG tablet, TAKE ONE TABLET BY MOUTH EVERY 30 DAYS AS DIRECTED ----TAKE WITH WATER ON AN EMPTY STOMACH AND NOTHING BY MOUTH OR LYING FOWN FOR THE NEXT 30 MINUTES ------, Disp: 3 tablet, Rfl: 1  •  rosuvastatin (CRESTOR) 5 MG tablet, Take 1 tablet by mouth Every Other Day., Disp: 90 tablet, Rfl: 0  •  dilTIAZem CD (Cardizem CD) 120 MG 24 hr capsule, Take 1 capsule by mouth Daily. D/c amlodipine, Disp: 30 capsule, Rfl: 1    Vital Signs:      /70   Pulse 91   Temp 97.8 °F (36.6 °C) (Infrared)   Resp 16   Ht 157.5 cm (62\")   Wt 61.2 kg (135 lb)   SpO2 98%   BMI 24.69 kg/m²     Vitals:    05/24/23 0844 05/24/23 0849 05/24/23 0906   BP: 153/70 164/76 158/70   BP Location: Left arm Right arm    Patient Position: Sitting Sitting    Cuff Size: Small Adult Small Adult    Pulse: 91     Resp: 16     Temp: 97.8 °F (36.6 °C)     TempSrc: Infrared     SpO2: 98%     Weight: 61.2 kg (135 lb)     Height: 157.5 cm (62\")        Physical Exam  Vitals and nursing note reviewed.   Constitutional:       Appearance: She is well-developed.   Cardiovascular:      Rate and Rhythm: Normal rate and regular rhythm.      Heart sounds: Normal heart sounds. No murmur heard.    No friction rub. No gallop.   Pulmonary:      Effort: Pulmonary effort is normal. "      Breath sounds: Normal breath sounds. No wheezing or rales.   Musculoskeletal:         General: Deformity present.      Comments: Trace of edema in bilateral lower extremities   Skin:     General: Skin is warm and dry.   Neurological:      General: No focal deficit present.      Mental Status: She is alert.      Cranial Nerves: Cranial nerves 2-12 are intact. No cranial nerve deficit.      Sensory: No sensory deficit.      Motor: No weakness.      Coordination: Coordination is intact.      Gait: Gait normal.      Deep Tendon Reflexes:      Reflex Scores:       Patellar reflexes are 1+ on the right side and 1+ on the left side.       Achilles reflexes are 1+ on the right side and 1+ on the left side.  Psychiatric:         Mood and Affect: Mood normal.         Behavior: Behavior normal.         Thought Content: Thought content normal.        Result Review :                  PHQ-9 Total Score:             Assessment and Plan    Diagnoses and all orders for this visit:    1. Benign hypertension (Primary)  Assessment & Plan:  Hypertension is unchanged.  Medication changes per orders.  Blood pressure will be reassessed 2 months.  Continue to watch blood pressure readings and call if any problems.  Discontinue amlodipine      2. Polyarthralgia  Comments:  Patient is weaning down on prednisone.  Discussed that this will improve her blood pressure as well.  Continue to monitor blood pressure     Other orders  -     dilTIAZem CD (Cardizem CD) 120 MG 24 hr capsule; Take 1 capsule by mouth Daily. D/c amlodipine  Dispense: 30 capsule; Refill: 1       Problem List Items Addressed This Visit        Active Problems    Benign hypertension - Primary    Current Assessment & Plan     Hypertension is unchanged.  Medication changes per orders.  Blood pressure will be reassessed 2 months.  Continue to watch blood pressure readings and call if any problems.  Discontinue amlodipine         Relevant Medications    dilTIAZem CD (Cardizem  CD) 120 MG 24 hr capsule    Polyarthralgia       Follow Up   Return in about 2 months (around 7/24/2023) for Recheck HTN.  Patient was given instructions and counseling regarding her condition or for health maintenance advice. Please see specific information pulled into the AVS if appropriate.

## 2023-05-24 NOTE — ASSESSMENT & PLAN NOTE
Hypertension is unchanged.  Medication changes per orders.  Blood pressure will be reassessed 2 months.  Continue to watch blood pressure readings and call if any problems.  Discontinue amlodipine

## 2023-05-26 RX ORDER — LOSARTAN POTASSIUM 100 MG/1
TABLET ORAL
Qty: 90 TABLET | Refills: 1 | Status: SHIPPED | OUTPATIENT
Start: 2023-05-26

## 2023-06-07 ENCOUNTER — OFFICE (OUTPATIENT)
Dept: RURAL CLINIC 3 | Facility: CLINIC | Age: 78
End: 2023-06-07

## 2023-06-07 VITALS
HEART RATE: 85 BPM | SYSTOLIC BLOOD PRESSURE: 128 MMHG | DIASTOLIC BLOOD PRESSURE: 61 MMHG | WEIGHT: 135 LBS | HEIGHT: 62 IN

## 2023-06-07 DIAGNOSIS — K21.9 GASTRO-ESOPHAGEAL REFLUX DISEASE WITHOUT ESOPHAGITIS: ICD-10-CM

## 2023-06-07 PROCEDURE — 99213 OFFICE O/P EST LOW 20 MIN: CPT | Performed by: NURSE PRACTITIONER

## 2023-07-25 LAB
ALBUMIN SERPL-MCNC: 4.1 G/DL (ref 3.8–4.8)
ALBUMIN/GLOB SERPL: 1.9 {RATIO} (ref 1.2–2.2)
ALP SERPL-CCNC: 69 IU/L (ref 44–121)
ALT SERPL-CCNC: 15 IU/L (ref 0–32)
AST SERPL-CCNC: 16 IU/L (ref 0–40)
BASOPHILS # BLD AUTO: 0.1 X10E3/UL (ref 0–0.2)
BASOPHILS NFR BLD AUTO: 1 %
BILIRUB SERPL-MCNC: <0.2 MG/DL (ref 0–1.2)
BUN SERPL-MCNC: 12 MG/DL (ref 8–27)
BUN/CREAT SERPL: 13 (ref 12–28)
CALCIUM SERPL-MCNC: 10.2 MG/DL (ref 8.7–10.3)
CHLORIDE SERPL-SCNC: 101 MMOL/L (ref 96–106)
CO2 SERPL-SCNC: 24 MMOL/L (ref 20–29)
CREAT SERPL-MCNC: 0.9 MG/DL (ref 0.57–1)
EGFRCR SERPLBLD CKD-EPI 2021: 65 ML/MIN/1.73
EOSINOPHIL # BLD AUTO: 0.2 X10E3/UL (ref 0–0.4)
EOSINOPHIL NFR BLD AUTO: 3 %
ERYTHROCYTE [DISTWIDTH] IN BLOOD BY AUTOMATED COUNT: 14.1 % (ref 11.7–15.4)
GLOBULIN SER CALC-MCNC: 2.2 G/DL (ref 1.5–4.5)
GLUCOSE SERPL-MCNC: 93 MG/DL (ref 70–99)
HBA1C MFR BLD: 5.9 % (ref 4.8–5.6)
HCT VFR BLD AUTO: 37.2 % (ref 34–46.6)
HGB BLD-MCNC: 11.9 G/DL (ref 11.1–15.9)
IMM GRANULOCYTES # BLD AUTO: 0 X10E3/UL (ref 0–0.1)
IMM GRANULOCYTES NFR BLD AUTO: 0 %
LYMPHOCYTES # BLD AUTO: 1.4 X10E3/UL (ref 0.7–3.1)
LYMPHOCYTES NFR BLD AUTO: 20 %
MCH RBC QN AUTO: 26.1 PG (ref 26.6–33)
MCHC RBC AUTO-ENTMCNC: 32 G/DL (ref 31.5–35.7)
MCV RBC AUTO: 82 FL (ref 79–97)
MONOCYTES # BLD AUTO: 0.7 X10E3/UL (ref 0.1–0.9)
MONOCYTES NFR BLD AUTO: 10 %
NEUTROPHILS # BLD AUTO: 4.7 X10E3/UL (ref 1.4–7)
NEUTROPHILS NFR BLD AUTO: 66 %
PLATELET # BLD AUTO: 341 X10E3/UL (ref 150–450)
POTASSIUM SERPL-SCNC: 4.6 MMOL/L (ref 3.5–5.2)
PROT SERPL-MCNC: 6.3 G/DL (ref 6–8.5)
RBC # BLD AUTO: 4.56 X10E6/UL (ref 3.77–5.28)
SODIUM SERPL-SCNC: 138 MMOL/L (ref 134–144)
VIT B12 SERPL-MCNC: 671 PG/ML (ref 232–1245)
WBC # BLD AUTO: 7.1 X10E3/UL (ref 3.4–10.8)

## 2023-07-26 ENCOUNTER — TELEPHONE (OUTPATIENT)
Dept: FAMILY MEDICINE CLINIC | Facility: CLINIC | Age: 78
End: 2023-07-26

## 2023-07-26 ENCOUNTER — OFFICE VISIT (OUTPATIENT)
Dept: FAMILY MEDICINE CLINIC | Facility: CLINIC | Age: 78
End: 2023-07-26
Payer: MEDICARE

## 2023-07-26 VITALS
OXYGEN SATURATION: 97 % | WEIGHT: 140 LBS | SYSTOLIC BLOOD PRESSURE: 142 MMHG | HEART RATE: 84 BPM | DIASTOLIC BLOOD PRESSURE: 68 MMHG | HEIGHT: 62 IN | TEMPERATURE: 97.9 F | BODY MASS INDEX: 25.76 KG/M2 | RESPIRATION RATE: 16 BRPM

## 2023-07-26 DIAGNOSIS — I10 BENIGN HYPERTENSION: Primary | ICD-10-CM

## 2023-07-26 DIAGNOSIS — E53.8 VITAMIN B12 DEFICIENCY: ICD-10-CM

## 2023-07-26 DIAGNOSIS — R73.03 PREDIABETES: ICD-10-CM

## 2023-07-26 DIAGNOSIS — E78.2 MIXED HYPERLIPIDEMIA: ICD-10-CM

## 2023-07-26 DIAGNOSIS — M81.0 AGE RELATED OSTEOPOROSIS, UNSPECIFIED PATHOLOGICAL FRACTURE PRESENCE: ICD-10-CM

## 2023-07-26 PROBLEM — R07.9 CHEST PAIN: Status: RESOLVED | Noted: 2022-04-28 | Resolved: 2023-07-26

## 2023-07-26 PROCEDURE — 3078F DIAST BP <80 MM HG: CPT | Performed by: NURSE PRACTITIONER

## 2023-07-26 PROCEDURE — 99213 OFFICE O/P EST LOW 20 MIN: CPT | Performed by: NURSE PRACTITIONER

## 2023-07-26 PROCEDURE — 3077F SYST BP >= 140 MM HG: CPT | Performed by: NURSE PRACTITIONER

## 2023-07-26 RX ORDER — PREDNISONE 5 MG/1
5-10 TABLET ORAL DAILY
COMMUNITY
Start: 2023-07-13

## 2023-07-26 NOTE — PROGRESS NOTES
Chief Complaint  Hypertension, Hyperlipidemia, and Prediabetes    Subjective          Cara Castañeda presents to [facility] for hypertension, hyperlipidemia and prediabetes.  Also discussed osteoporosis    History of Present Illness    Patient is here to follow-up on hypertension hyperlipidemia and prediabetes.  She is compliant with her medications.  She is doing well currently.  She has had a flare of arthritis and is now tapering down on her prednisone dose.  Blood pressure seems to be well controlled at home.  Blood sugars better as well.    Patient has a history of osteoporosis and Actonel is not improving her bone status.  She does not take calcium any longer secondary to history of hyperkalemia.  She has never been on Prolia.  Her DEXA scan was reviewed and recommendations to start Prolia were discussed.        Cara Castañeda  has a past medical history of Allergic rhinitis, Asthma, Bruit of left carotid artery (02/20/2020), Carotid stenosis, Cold sore, Gallstone pancreatitis (2006), GERD (gastroesophageal reflux disease), Hiatal hernia, Hyperlipidemia, Hypertension, Left ventricular hypertrophy, Osteoarthritis, Osteopenia with high risk of fracture, Pneumonia (March, April, May,2022), Pre-diabetes, and Vocal cord nodule.      Review of Systems   Constitutional:  Negative for fever.   Respiratory:  Negative for cough and shortness of breath.    Cardiovascular:  Negative for chest pain and leg swelling.   Gastrointestinal:  Negative for abdominal pain.   Musculoskeletal:  Positive for arthralgias. Negative for joint swelling.   Skin:  Negative for rash.      Objective       Current Outpatient Medications:     acetaminophen (TYLENOL) 325 MG tablet, Take 2 tablets by mouth Every 6 (Six) Hours As Needed., Disp: , Rfl:     aspirin 81 MG EC tablet, Take 1 tablet by mouth Daily. (Patient taking differently: Take 1 tablet by mouth Every Other Day.), Disp: 100 tablet, Rfl: 0     "budesonide-formoterol (SYMBICORT) 160-4.5 MCG/ACT inhaler, Inhale 2 puffs 2 (Two) Times a Day., Disp: 1 each, Rfl: 11    cetirizine (zyrTEC) 10 MG tablet, Take 1 tablet by mouth Daily., Disp: , Rfl:     guaiFENesin (MUCINEX) 600 MG 12 hr tablet, Take 1 tablet by mouth 2 (Two) Times a Day., Disp: , Rfl:     hydroxychloroquine (PLAQUENIL) 200 MG tablet, Take  by mouth 2 (Two) Times a Day., Disp: , Rfl:     levalbuterol (XOPENEX HFA) 45 MCG/ACT inhaler, Inhale 2 puffs Every 6 (Six) Hours As Needed., Disp: , Rfl:     losartan (COZAAR) 100 MG tablet, TAKE ONE TABLET BY MOUTH ONCE DAILY, Disp: 90 tablet, Rfl: 1    montelukast (SINGULAIR) 10 MG tablet, TAKE ONE TABLET BY MOUTH ONCE DAILY, Disp: 90 tablet, Rfl: 1    pantoprazole (PROTONIX) 40 MG EC tablet, Take 1 tablet by mouth Daily., Disp: , Rfl:     predniSONE (DELTASONE) 5 MG tablet, Take 1-2 tablets by mouth Daily., Disp: , Rfl:     risedronate (ACTONEL) 150 MG tablet, TAKE ONE TABLET BY MOUTH EVERY 30 DAYS AS DIRECTED ----TAKE WITH WATER ON AN EMPTY STOMACH AND NOTHING BY MOUTH OR LYING FOWN FOR THE NEXT 30 MINUTES ------, Disp: 3 tablet, Rfl: 1    rosuvastatin (CRESTOR) 5 MG tablet, Take 1 tablet by mouth Every Other Day., Disp: 90 tablet, Rfl: 0    Tiadylt  MG 24 hr capsule, TAKE 1 CAPSULE BY MOUTH DAILY. D/C AMLODIPINE, Disp: 30 capsule, Rfl: 1    Vital Signs:      /68   Pulse 84   Temp 97.9 °F (36.6 °C) (Infrared)   Resp 16   Ht 157.5 cm (62\")   Wt 63.5 kg (140 lb)   SpO2 97%   BMI 25.61 kg/m²     Vitals:    07/26/23 0818 07/26/23 0821 07/26/23 0850   BP: 154/74 146/74 142/68   BP Location: Right arm Left arm    Patient Position: Sitting Sitting    Cuff Size: Small Adult Small Adult    Pulse: 84     Resp: 16     Temp: 97.9 °F (36.6 °C)     TempSrc: Infrared     SpO2: 97%     Weight: 63.5 kg (140 lb)     Height: 157.5 cm (62\")        Physical Exam  Vitals and nursing note reviewed.   Constitutional:       Appearance: She is well-developed. "   Cardiovascular:      Rate and Rhythm: Normal rate and regular rhythm.      Heart sounds: Normal heart sounds. No murmur heard.    No friction rub. No gallop.   Pulmonary:      Effort: Pulmonary effort is normal.      Breath sounds: Normal breath sounds. No wheezing or rales.   Musculoskeletal:         General: Deformity present.      Comments: Arthritic hands   Skin:     General: Skin is warm and dry.   Neurological:      General: No focal deficit present.      Mental Status: She is alert.      Cranial Nerves: Cranial nerves 2-12 are intact. No cranial nerve deficit.      Sensory: No sensory deficit.      Motor: No weakness.      Coordination: Coordination is intact.      Gait: Gait normal.      Deep Tendon Reflexes:      Reflex Scores:       Patellar reflexes are 1+ on the right side and 1+ on the left side.       Achilles reflexes are 1+ on the right side and 1+ on the left side.  Psychiatric:         Mood and Affect: Mood normal.         Behavior: Behavior normal.         Thought Content: Thought content normal.      Result Review :     CMP          4/19/2023    10:16 4/20/2023    07:20 7/24/2023    08:06   CMP   Glucose 126  94  93    BUN 8  10  12    Creatinine 0.53  0.54  0.90    EGFR 94.2  93.8     Sodium 130  131  138    Potassium 3.3  3.8  4.6    Chloride 93  96  101    Calcium 9.7  9.5  10.2    Total Protein   6.3    Total Protein 6.5      Albumin 3.7   4.1    Globulin   2.2    Globulin 2.8      Total Bilirubin 0.2   <0.2    Alkaline Phosphatase 96   69    AST (SGOT) 12   16    ALT (SGPT) 10   15    Albumin/Globulin Ratio 1.3      BUN/Creatinine Ratio 15.1  18.5  13    Anion Gap 13.0  12.0       CBC w/diff          4/19/2023    10:16 4/20/2023    07:20 7/24/2023    08:06   CBC w/Diff   WBC 11.30  9.30  7.1    RBC 4.32  4.28  4.56    Hemoglobin 11.4  11.3  11.9    Hematocrit 35.3  34.6  37.2    MCV 81.7  80.8  82    MCH 26.4  26.5  26.1    MCHC 32.3  32.8  32.0    RDW 14.8  15.3  14.1    Platelets 569   576  341    Neutrophil Rel % 82.9  78.6  66    Lymphocyte Rel % 7.5  9.9  20    Monocyte Rel % 7.3  7.9  10    Eosinophil Rel % 1.6  2.8  3    Basophil Rel % 0.7  0.8  1      A1C Last 3 Results          10/4/2022    08:55 3/31/2023    08:23 7/24/2023    08:06   HGBA1C Last 3 Results   Hemoglobin A1C 5.3  6.0  5.9                 PHQ-9 Total Score:             Assessment and Plan    Diagnoses and all orders for this visit:    1. Benign hypertension (Primary)  Assessment & Plan:  Hypertension is improving with treatment.  Continue current treatment regimen.  Blood pressure will be reassessed at the next regular appointment.    Orders:  -     Comprehensive Metabolic Panel    2. Mixed hyperlipidemia  Assessment & Plan:  Lipid abnormalities are improving with treatment.  Pharmacotherapy as ordered.  Lipids will be reassessed  check on Medicare wellness .    Orders:  -     Comprehensive Metabolic Panel    3. Prediabetes  Assessment & Plan:  Improved.  Likely was was increased by steroid    Orders:  -     Comprehensive Metabolic Panel  -     Hemoglobin A1c    4. Vitamin B12 deficiency  -     CBC & Differential  -     Vitamin B12    5. BMI 25.0-25.9,adult    6. Age related osteoporosis, unspecified pathological fracture presence  Comments:  Discussed risk and benefits of switching to Prolia.  We will pursue authorization         Problem List Items Addressed This Visit          Active Problems    Benign hypertension - Primary    Current Assessment & Plan     Hypertension is improving with treatment.  Continue current treatment regimen.  Blood pressure will be reassessed at the next regular appointment.         Relevant Orders    Comprehensive Metabolic Panel (Completed)    Hyperlipidemia    Current Assessment & Plan     Lipid abnormalities are improving with treatment.  Pharmacotherapy as ordered.  Lipids will be reassessed  check on Medicare wellness .         Relevant Orders    Comprehensive Metabolic Panel (Completed)     Prediabetes    Current Assessment & Plan     Improved.  Likely was was increased by steroid         Relevant Orders    Comprehensive Metabolic Panel (Completed)    Hemoglobin A1c (Completed)    Osteopenia with high risk of fracture    BMI 25.0-25.9,adult     Other Visit Diagnoses       Vitamin B12 deficiency        Relevant Orders    CBC & Differential (Completed)    Vitamin B12 (Completed)            Follow Up   Return in about 8 months (around 4/8/2024) for Medicare Wellness.  Patient was given instructions and counseling regarding her condition or for health maintenance advice. Please see specific information pulled into the AVS if appropriate.

## 2023-07-26 NOTE — ASSESSMENT & PLAN NOTE
Lipid abnormalities are improving with treatment.  Pharmacotherapy as ordered.  Lipids will be reassessed  check on Medicare wellness .

## 2023-07-26 NOTE — TELEPHONE ENCOUNTER
Can we get an authorization started for patient to have Prolia? She currently is on Actonel and is having a worsening in her DEXA scan that was done this spring.

## 2023-08-26 ENCOUNTER — TELEPHONE (OUTPATIENT)
Dept: FAMILY MEDICINE CLINIC | Facility: CLINIC | Age: 78
End: 2023-08-26
Payer: MEDICARE

## 2023-08-28 ENCOUNTER — OFFICE VISIT (OUTPATIENT)
Dept: PULMONOLOGY | Facility: HOSPITAL | Age: 78
End: 2023-08-28
Payer: MEDICARE

## 2023-08-28 VITALS
HEIGHT: 62 IN | DIASTOLIC BLOOD PRESSURE: 76 MMHG | SYSTOLIC BLOOD PRESSURE: 136 MMHG | BODY MASS INDEX: 26.31 KG/M2 | OXYGEN SATURATION: 98 % | HEART RATE: 81 BPM | WEIGHT: 143 LBS | RESPIRATION RATE: 14 BRPM

## 2023-08-28 DIAGNOSIS — J47.9 BRONCHIECTASIS WITHOUT COMPLICATION: ICD-10-CM

## 2023-08-28 DIAGNOSIS — F51.01 PRIMARY INSOMNIA: ICD-10-CM

## 2023-08-28 DIAGNOSIS — J30.9 ALLERGIC RHINITIS, UNSPECIFIED SEASONALITY, UNSPECIFIED TRIGGER: ICD-10-CM

## 2023-08-28 DIAGNOSIS — J45.30 MILD PERSISTENT ASTHMA WITHOUT COMPLICATION: Primary | ICD-10-CM

## 2023-08-28 PROBLEM — M11.20 PSEUDOGOUT: Status: ACTIVE | Noted: 2023-08-28

## 2023-08-28 PROCEDURE — G0463 HOSPITAL OUTPT CLINIC VISIT: HCPCS

## 2023-08-28 RX ORDER — DILTIAZEM HYDROCHLORIDE 120 MG/1
120 CAPSULE, EXTENDED RELEASE ORAL DAILY
COMMUNITY

## 2023-08-28 NOTE — PROGRESS NOTES
SLEEP/PULMONARY  CLINIC NOTE      PATIENT IDENTIFICATION:  Name: Cara Castañeda  Age: 77 y.o.  Sex: female  :  1945  MRN: [id number]    DATE OF CONSULTATION:  2023                     CHIEF COMPLAINT: Follow-up on asthma    History of Present Illness:   Cara Castañeda is a 77 y.o. female patient feeling significantly better less shortness of breath less coughing less wheezing when she coughs the sputum is only white and gray and the only change with the weather, since we saw her last time she did not have to take any antibiotics and she is still using her Symbicort no side effect  Patient is taking small dose of prednisone by her primary care  Regard to her insomnia all symptoms resolved and is sleeping good 8 hours at night sometimes awakening or twice but no sleepiness no tiredness during the day      Review of Systems:   Constitutional: As above   Eyes: negative   ENT/oropharynx: negative   Cardiovascular: negative   Respiratory: As above   Gastrointestinal: negative   Genitourinary: negative   Neurological: negative   Musculoskeletal: negative   Integument/breast: negative   Endocrine: negative   Allergic/Immunologic: negative     Past Medical History:  Past Medical History:   Diagnosis Date    Allergic rhinitis     on immunotherapy- Dr. Benito     Asthma     Dr. Benito    Bruit of left carotid artery 2020    Carotid stenosis     <50%    Cold sore     Gallstone pancreatitis 2006    GERD (gastroesophageal reflux disease)     Dr. Oliva    Hiatal hernia     small, sliding     Hyperlipidemia     Hypertension     Left ventricular hypertrophy     Osteoarthritis     Dr. Avila    Osteopenia with high risk of fracture     Pneumonia March, April, May,2022    Pre-diabetes     Pseudogout     Vocal cord nodule     Dr. Nieves       Past Surgical History:  Past Surgical History:   Procedure Laterality Date    APPENDECTOMY      CHOLECYSTECTOMY  2006    Dr. Hernandez    ENDOSCOPY  2017    &  Dilation. Dr. Oliva    ERCP  05/2006    Dr. Oliva    HYSTERECTOMY  1974    ovaries remain, for benign reasons    OTHER SURGICAL HISTORY  2009    Stress Echo. No ischemia. EF 60-65%.     TONSILLECTOMY AND ADENOIDECTOMY      TOTAL KNEE ARTHROPLASTY  08/31/2017        Family History:  Family History   Problem Relation Age of Onset    Hypertension Mother     Hypertension Father     Heart disease Father     Osteoarthritis Father     Rheum arthritis Brother     Prostate cancer Paternal Grandfather     Breast cancer Neg Hx     Ovarian cancer Neg Hx         Social History:   Social History     Tobacco Use    Smoking status: Never     Passive exposure: Never    Smokeless tobacco: Never   Substance Use Topics    Alcohol use: No        Allergies:  Allergies   Allergen Reactions    Aspirin Other (See Comments)     bruising    Crestor [Rosuvastatin Calcium] Myalgia    Albuterol Sulfate Anxiety    Atorvastatin Calcium Nausea Only    Sulfa Antibiotics Nausea Only    Tolterodine Nausea Only     Detrol       Home Meds:  (Not in a hospital admission)      Objective:    Vitals Ranges:   Heart Rate:  [81] 81  Resp:  [14] 14  BP: (136)/(76) 136/76  Body mass index is 26.16 kg/mý.     Exam:  General Appearance:  WDWN    HEENT:   without obvious abnormality,  Conjunctiva/corneas clear,  Normal external ear canals, no drainage    Clear orsalmucosa,  Mallampati score 3    Neck:  Supple, symmetrical, trachea midline. No JVD.  Lungs:   Bilateral basal rhonchi bilaterally, respirations unlabored symmetrical wall movement.    Chest wall:  No tenderness or deformity.    Heart:  Regular rate and rhythm, S1 and S2 normal.  Extremities: Trace edema no clubbing or Cyanosis        Data Review:  All labs (24hrs): No results found for this or any previous visit (from the past 24 hour(s)).     Imaging:  Adult Transthoracic Echo Complete W/ Cont if Necessary Per Protocol (With Agitated Saline)    Left ventricular systolic function is normal.  Estimated left   ventricular EF = 67%    Left ventricular wall thickness is consistent with mild to moderate   concentric hypertrophy.    Left ventricular diastolic function is consistent with (grade Ia w/high   LAP) impaired relaxation and age.    Transthoracic echocardiography reveals mild to moderate LVH with EF of 67%   with diastolic dysfunction.  Moderate left atrial enlargement.  Bubble   study without evidence of any shunt.  No effusion    Electronically signed by Dada Strickland MD, 04/20/23, 8:51 PM EDT.        ASSESSMENT:  Diagnoses and all orders for this visit:    Mild persistent asthma without complication    Bronchiectasis without complication    Primary insomnia    Allergic rhinitis, unspecified seasonality, unspecified trigger    Other orders  -     dilTIAZem (Tiadylt ER) 120 MG 24 hr capsule; Take 1 capsule by mouth Daily.        PLAN:  Insomnia resolved    Bronchodilator inhaled corticosteroid    Education how to use inhalers    Encouraged to use incentive spirometer    Continue to exercise slowly as tolerated    Monitor for any change in the color of the sputum    Avoid any exposure to fumes, gas or any irritant        It is recommended to keep thyroid function optimized to improve quality of sleep, and avoid recurrent respiratory infection    Follow-up 6 months    Natty Hodge MD. D, ABSM.  8/28/2023  13:16 EDT

## 2023-08-29 ENCOUNTER — CLINICAL SUPPORT (OUTPATIENT)
Dept: FAMILY MEDICINE CLINIC | Facility: CLINIC | Age: 78
End: 2023-08-29
Payer: MEDICARE

## 2023-08-29 DIAGNOSIS — M81.0 AGE RELATED OSTEOPOROSIS, UNSPECIFIED PATHOLOGICAL FRACTURE PRESENCE: Primary | ICD-10-CM

## 2023-09-12 RX ORDER — DILTIAZEM HYDROCHLORIDE 120 MG/1
CAPSULE, EXTENDED RELEASE ORAL
Qty: 30 CAPSULE | Refills: 1 | Status: SHIPPED | OUTPATIENT
Start: 2023-09-12

## 2023-10-12 NOTE — CONSULTS
Diabetes Education    Patient Name:  Cara Castañeda  YOB: 1945  MRN: [id number]  Admit Date:  4/19/2023    Consult received due to stroke protocol being ordered. Pt does not have history of diabetes. Pt does not take diabetes medication at home and is not receiving diabetes medication in hospital. A1c has not been ordered this adm. Last A1c in [facility] was from 3/31/2023 and result was 6.0%. Reviewed this result is falling within prediabetes range. Reviewed importance of controlling bs with meal plan and exercise program.     Pt states A1c was elevated at that time due to having received steroid injections and 3 rounds of oral steroids. Reviewed previous A1c results from 2/22/2022 of 5.8% and from 8/26/2021 of 6.0%.     Pt states she has been told that she is falling in the prediabetes range in the past and understands importance of meal plan and exercising in helping to control bs. Pt states she does not need educator to review this info at this time.     Gave Planning Healthy Meals packet, A1c info sheet, Prediabetes handout and [facility] Prediabetes brochure. Pt with no additional questions at this time.       Electronically signed by:  Gregoria Mack RN  04/20/23 13:33 EDT   Paramedian Forehead Flap Text: A decision was made to reconstruct the defect utilizing an interpolation axial flap and a staged reconstruction.  A telfa template was made of the defect.  This telfa template was then used to outline the paramedian forehead pedicle flap.  The donor area for the pedicle flap was then injected with anesthesia.  The flap was excised through the skin and subcutaneous tissue down to the layer of the underlying musculature.  The pedicle flap was carefully excised within this deep plane to maintain its blood supply.  The edges of the donor site were undermined.   The donor site was closed in a primary fashion.  The pedicle was then rotated into position and sutured.  Once the tube was sutured into place, adequate blood supply was confirmed with blanching and refill.  The pedicle was then wrapped with xeroform gauze and dressed appropriately with a telfa and gauze bandage to ensure continued blood supply and protect the attached pedicle.

## 2023-10-23 RX ORDER — MONTELUKAST SODIUM 10 MG/1
10 TABLET ORAL DAILY
Qty: 90 TABLET | Refills: 1 | Status: SHIPPED | OUTPATIENT
Start: 2023-10-23

## 2023-10-23 RX ORDER — RISEDRONATE SODIUM 150 MG/1
TABLET, FILM COATED ORAL
Qty: 3 TABLET | Refills: 1 | Status: SHIPPED | OUTPATIENT
Start: 2023-10-23

## 2023-11-06 RX ORDER — DILTIAZEM HYDROCHLORIDE 120 MG/1
CAPSULE, EXTENDED RELEASE ORAL
Qty: 90 CAPSULE | Refills: 1 | Status: SHIPPED | OUTPATIENT
Start: 2023-11-06

## 2023-11-17 RX ORDER — LOSARTAN POTASSIUM 100 MG/1
100 TABLET ORAL DAILY
Qty: 90 TABLET | Refills: 1 | Status: SHIPPED | OUTPATIENT
Start: 2023-11-17

## 2023-12-11 ENCOUNTER — OFFICE VISIT (OUTPATIENT)
Dept: FAMILY MEDICINE CLINIC | Facility: CLINIC | Age: 78
End: 2023-12-11
Payer: MEDICARE

## 2023-12-11 DIAGNOSIS — M25.571 ACUTE BILATERAL ANKLE PAIN: ICD-10-CM

## 2023-12-11 DIAGNOSIS — T36.95XS: Primary | ICD-10-CM

## 2023-12-11 DIAGNOSIS — J40 BRONCHITIS: ICD-10-CM

## 2023-12-11 DIAGNOSIS — R91.8 MULTIPLE LUNG NODULES ON CT: ICD-10-CM

## 2023-12-11 DIAGNOSIS — M25.572 ACUTE BILATERAL ANKLE PAIN: ICD-10-CM

## 2023-12-11 DIAGNOSIS — I10 BENIGN HYPERTENSION: ICD-10-CM

## 2023-12-11 RX ORDER — OMEPRAZOLE 20 MG/1
20 CAPSULE, DELAYED RELEASE ORAL DAILY
COMMUNITY

## 2023-12-11 RX ORDER — DENOSUMAB 60 MG/ML
INJECTION SUBCUTANEOUS
COMMUNITY

## 2023-12-11 RX ORDER — CEFDINIR 300 MG/1
CAPSULE ORAL
COMMUNITY
Start: 2023-12-09

## 2023-12-11 RX ORDER — LEVOFLOXACIN 500 MG/1
TABLET, FILM COATED ORAL
COMMUNITY
Start: 2023-12-01 | End: 2023-12-11

## 2023-12-11 NOTE — PROGRESS NOTES
Chief Complaint  Ankle Pain    Subjective          Cara Castañeda presents to [facility] for pain and follow-up on lung nodules    History of Present Illness      Patient was seen at the [facility] clinic recently for bronchitis-like symptoms.  Due to her lung condition she requested Levaquin.  About 2 days after starting the Levaquin she started with Achilles pain.  She has had some arthritic like pain in the past and is currently taking Plaquenil.  She stopped the Levaquin on December 8, 2023.  She went to the emergency room at [facility] and was switched to cefdinir.  Her cough like symptoms are about the same.  She is using her inhalers but she does feel like her wheezing is better    Patient has lung nodules and had been stable this time for reassessment of those.  Has not been done and she has received a letter.      She does have a history of continuing wheezing and coughing.  And we have discussed today following up her bronchitis and symptoms.  She is to self monitor at home as well.    Cara Castañeda  has a past medical history of Allergic rhinitis, Asthma, Bruit of left carotid artery (02/20/2020), Carotid stenosis, Cold sore, Gallstone pancreatitis (2006), GERD (gastroesophageal reflux disease), Hiatal hernia, Hyperlipidemia, Hypertension, Left ventricular hypertrophy, Osteoarthritis, Osteopenia with high risk of fracture, Other long term (current) drug therapy (01/29/2019), Pneumonia (March, April, May,2022), Pre-diabetes, Pseudogout, and Vocal cord nodule.      Review of Systems   Constitutional:  Negative for fatigue and fever.   Respiratory:  Positive for cough and wheezing.    Musculoskeletal:         Achilles pain        Objective       Current Outpatient Medications:     acetaminophen (TYLENOL) 325 MG tablet, Take 2 tablets by mouth Every 6 (Six) Hours As Needed., Disp: , Rfl:     aspirin 81 MG EC tablet, Take 1 tablet by mouth Daily. (Patient  "taking differently: Take 1 tablet by mouth Every Other Day.), Disp: 100 tablet, Rfl: 0    budesonide-formoterol (SYMBICORT) 160-4.5 MCG/ACT inhaler, Inhale 2 puffs 2 (Two) Times a Day., Disp: 1 each, Rfl: 11    cefdinir (OMNICEF) 300 MG capsule, See Instructions, Twice daily for infection., # 20 tab(s), 0 Refill(s), Pharmacy: [facility]/pharmacy #[id number], Twice daily for infection., 160.02, cm, 05/18/22 9:12:00 EDT, Height, 60.8, kg, 12/09/23 10:56:00 EST, Weight Dosing, Disp: , Rfl:     cetirizine (zyrTEC) 10 MG tablet, Take 1 tablet by mouth Daily., Disp: , Rfl:     denosumab (Prolia) 60 MG/ML solution prefilled syringe syringe, , Disp: , Rfl:     dilTIAZem (Tiadylt ER) 120 MG 24 hr capsule, TAKE 1 CAPSULE BY MOUTH DAILY. STOP AMLODIPINE, Disp: 90 capsule, Rfl: 1    guaiFENesin (MUCINEX) 600 MG 12 hr tablet, Take 1 tablet by mouth 2 (Two) Times a Day., Disp: , Rfl:     hydroxychloroquine (PLAQUENIL) 200 MG tablet, Take  by mouth 2 (Two) Times a Day., Disp: , Rfl:     levalbuterol (XOPENEX HFA) 45 MCG/ACT inhaler, Inhale 2 puffs Every 6 (Six) Hours As Needed., Disp: , Rfl:     losartan (COZAAR) 100 MG tablet, TAKE 1 TABLET BY MOUTH EVERY DAY, Disp: 90 tablet, Rfl: 1    montelukast (SINGULAIR) 10 MG tablet, TAKE 1 TABLET BY MOUTH EVERY DAY, Disp: 90 tablet, Rfl: 1    omeprazole (priLOSEC) 20 MG capsule, Take 1 capsule by mouth Daily., Disp: , Rfl:     predniSONE (DELTASONE) 5 MG tablet, Take 0.5 tablets by mouth Every Other Day., Disp: , Rfl:     rosuvastatin (CRESTOR) 5 MG tablet, Take 1 tablet by mouth Every Other Day. (Patient not taking: Reported on 12/11/2023), Disp: 90 tablet, Rfl: 0    Vital Signs:      /84   Pulse 95   Temp 97.2 °F (36.2 °C) (Infrared)   Resp 16   Ht 157.5 cm (62\")   Wt 60.8 kg (134 lb)   SpO2 95%   BMI 24.51 kg/m²     Vitals:    12/11/23 1011 12/11/23 1019 12/11/23 1030   BP: 152/79 154/91 138/84   BP Location: Left arm Right arm    Patient Position: Sitting Sitting    Cuff Size: Small " "Adult Small Adult    Pulse: 95     Resp: 16     Temp: 97.2 °F (36.2 °C)     TempSrc: Infrared     SpO2: 95%     Weight: 60.8 kg (134 lb)     Height: 157.5 cm (62\")        Physical Exam  Vitals and nursing note reviewed.   Constitutional:       General: She is not in acute distress.     Appearance: Normal appearance. She is well-developed and normal weight.   HENT:      Head: Normocephalic and atraumatic.      Right Ear: Tympanic membrane, ear canal and external ear normal.      Left Ear: Tympanic membrane, ear canal and external ear normal.      Nose: Nose normal.      Mouth/Throat:      Mouth: Mucous membranes are moist.      Dentition: Normal dentition.      Tongue: No lesions.      Pharynx: Uvula midline.   Eyes:      Conjunctiva/sclera: Conjunctivae normal.      Pupils: Pupils are equal, round, and reactive to light.   Cardiovascular:      Rate and Rhythm: Regular rhythm.      Heart sounds: Normal heart sounds. No murmur heard.     No friction rub. No gallop.   Pulmonary:      Effort: Pulmonary effort is normal.      Breath sounds: Wheezing present. No rhonchi.      Comments: Scattered wheezes  Musculoskeletal:         General: Normal range of motion.      Cervical back: Normal range of motion and neck supple.      Comments: Bilateral Achilles intact with no inflammation or edema.  Generalized tenderness   Skin:     General: Skin is warm and dry.   Neurological:      General: No focal deficit present.      Mental Status: She is alert.   Psychiatric:         Mood and Affect: Mood normal.         Behavior: Behavior normal.        Result Review :                  PHQ-9 Total Score:             Assessment and Plan    Diagnoses and all orders for this visit:    1. Adverse reaction to antibiotic, sequela (Primary)  Comments:  Levaquin stopped on December 8, 2023.    2. Multiple lung nodules on CT  Comments:  CT ordered to recheck lung nodules.  Previously had been stable  Orders:  -     CT Chest Without Contrast; " Future    3. Benign hypertension  Assessment & Plan:  Hypertension is improving with treatment.  Continue current treatment regimen.  Blood pressure will be reassessed at the next regular appointment.      4. Acute bilateral ankle pain  Comments:  No apparent injury.  Patient stopped Levaquin.  Advised to rest ankles.  Follow-up if problem    5. Bronchitis  Comments:  Patient was switched to cefdinir in the ER at [facility] after having Achilles pain.  Will recheck her in 2 weeks.  May need chest x-ray         Problem List Items Addressed This Visit          Active Problems    Benign hypertension    Current Assessment & Plan     Hypertension is improving with treatment.  Continue current treatment regimen.  Blood pressure will be reassessed at the next regular appointment.          Other Visit Diagnoses       Adverse reaction to antibiotic, sequela    -  Primary    Levaquin stopped on December 8, 2023.    Multiple lung nodules on CT        CT ordered to recheck lung nodules.  Previously had been stable    Relevant Orders    CT Chest Without Contrast    Acute bilateral ankle pain        No apparent injury.  Patient stopped Levaquin.  Advised to rest ankles.  Follow-up if problem    Bronchitis        Patient was switched to cefdinir in the ER at [facility] after having Achilles pain.  Will recheck her in 2 weeks.  May need chest x-ray            Follow Up   Return in about 16 days (around 12/27/2023) for Recheck cough and bronchitis.  Patient was given instructions and counseling regarding her condition or for health maintenance advice. Please see specific information pulled into the AVS if appropriate.

## 2023-12-12 VITALS
WEIGHT: 134 LBS | DIASTOLIC BLOOD PRESSURE: 84 MMHG | RESPIRATION RATE: 16 BRPM | HEART RATE: 95 BPM | TEMPERATURE: 97.2 F | OXYGEN SATURATION: 95 % | BODY MASS INDEX: 24.66 KG/M2 | SYSTOLIC BLOOD PRESSURE: 138 MMHG | HEIGHT: 62 IN

## 2023-12-18 ENCOUNTER — HOSPITAL ENCOUNTER (OUTPATIENT)
Dept: CT IMAGING | Facility: HOSPITAL | Age: 78
Discharge: HOME | End: 2023-12-18
Admitting: NURSE PRACTITIONER
Payer: MEDICARE

## 2023-12-18 DIAGNOSIS — R91.8 MULTIPLE LUNG NODULES ON CT: ICD-10-CM

## 2023-12-18 PROCEDURE — 71250 CT THORAX DX C-: CPT

## 2023-12-21 ENCOUNTER — TELEPHONE (OUTPATIENT)
Dept: FAMILY MEDICINE CLINIC | Facility: CLINIC | Age: 78
End: 2023-12-21
Payer: MEDICARE

## 2023-12-21 NOTE — PROGRESS NOTES
Patient was notified of information and she said that cough has improved and she does not need anything at this time

## 2023-12-21 NOTE — TELEPHONE ENCOUNTER
Caller: Cara Castañeda    Relationship to patient: Self    Best call back number: [phone]     Patient is needing: INFORM AGNES BROOKE THAT SHE DOES NOT NEED ANYMORE ANTIBIOTICS AND SHE WILL BE AT HER APPOINTMENT ON 12/27/23

## 2023-12-27 ENCOUNTER — HOSPITAL ENCOUNTER (OUTPATIENT)
Dept: GENERAL RADIOLOGY | Facility: HOSPITAL | Age: 78
Discharge: HOME | End: 2023-12-27
Admitting: NURSE PRACTITIONER
Payer: MEDICARE

## 2023-12-27 ENCOUNTER — OFFICE VISIT (OUTPATIENT)
Dept: FAMILY MEDICINE CLINIC | Facility: CLINIC | Age: 78
End: 2023-12-27
Payer: MEDICARE

## 2023-12-27 VITALS
WEIGHT: 134 LBS | HEART RATE: 86 BPM | BODY MASS INDEX: 24.66 KG/M2 | RESPIRATION RATE: 16 BRPM | DIASTOLIC BLOOD PRESSURE: 76 MMHG | SYSTOLIC BLOOD PRESSURE: 132 MMHG | TEMPERATURE: 97.3 F | OXYGEN SATURATION: 97 % | HEIGHT: 62 IN

## 2023-12-27 DIAGNOSIS — I11.9 HYPERTENSIVE HEART DISEASE WITHOUT HEART FAILURE: ICD-10-CM

## 2023-12-27 DIAGNOSIS — I10 BENIGN HYPERTENSION: ICD-10-CM

## 2023-12-27 DIAGNOSIS — I25.10 CORONARY ARTERY CALCIFICATION SEEN ON CT SCAN: ICD-10-CM

## 2023-12-27 DIAGNOSIS — M25.572 ACUTE LEFT ANKLE PAIN: ICD-10-CM

## 2023-12-27 DIAGNOSIS — J47.9 BRONCHIECTASIS WITHOUT COMPLICATION: Primary | ICD-10-CM

## 2023-12-27 DIAGNOSIS — J45.30 MILD PERSISTENT ASTHMA WITHOUT COMPLICATION: ICD-10-CM

## 2023-12-27 DIAGNOSIS — I77.1 SUBCLAVIAN ARTERIAL STENOSIS: ICD-10-CM

## 2023-12-27 DIAGNOSIS — S93.402A SPRAIN OF LEFT ANKLE, UNSPECIFIED LIGAMENT, INITIAL ENCOUNTER: ICD-10-CM

## 2023-12-27 PROCEDURE — 73610 X-RAY EXAM OF ANKLE: CPT

## 2023-12-27 NOTE — PROGRESS NOTES
Chief Complaint  Cough    Subjective          Cara Castañeda presents to [facility] for follow-up cough and congestion CT scan and fall and ankle pain.    History of Present Illness      Patient is here to follow-up after cough and congestion continued.  She has a history of bronchiectasis and follows with pulmonary.  She is stable on her inhalers and the cough and sinus congestion have improved.    Patient had a history of lung nodule which was to be followed up on.  That is actually improved on the CT scan which is incorporated below.  She does however have tree-in-bud pattern after recently having illness.  She is better and we have discussed if she gets worse again we will reimage.  However she does follow-up with pulmonary and may have exacerbations and she understands that.  She has been on 2 rounds of antibiotics and steroids    Patient does have some increased calcification of coronary arteries as well as subclavian.  Will do referrals to her cardiologist to have of choice.  She has no chest pain or symptoms now.  Has not had a stress test or evaluation for some time.      Patient fell on the step last week and has some bruising and swelling in her ankle.  Overall she feels well as long as she is sitting down.  She does have some soreness and bruising.  She has pain when she stands on the ankle that is improving with time.        Cara Castañeda  has a past medical history of Allergic rhinitis, Asthma, Bruit of left carotid artery (02/20/2020), Carotid stenosis, Cold sore, Gallstone pancreatitis (2006), GERD (gastroesophageal reflux disease), Hiatal hernia, Hyperlipidemia, Hypertension, Left ventricular hypertrophy, Osteoarthritis, Osteopenia with high risk of fracture, Other long term (current) drug therapy (01/29/2019), Pneumonia (March, April, May,2022), Pre-diabetes, Pseudogout, and Vocal cord nodule.      Review of Systems   Constitutional:  Negative for fever.    Respiratory:  Negative for cough and shortness of breath.    Cardiovascular:  Negative for chest pain and leg swelling.   Gastrointestinal:  Negative for abdominal pain.   Musculoskeletal:  Positive for arthralgias. Negative for joint swelling.   Skin:  Negative for rash.        Objective       Current Outpatient Medications:     acetaminophen (TYLENOL) 325 MG tablet, Take 2 tablets by mouth Every 6 (Six) Hours As Needed., Disp: , Rfl:     aspirin 81 MG EC tablet, Take 1 tablet by mouth Daily. (Patient taking differently: Take 1 tablet by mouth Every Other Day.), Disp: 100 tablet, Rfl: 0    budesonide-formoterol (SYMBICORT) 160-4.5 MCG/ACT inhaler, Inhale 2 puffs 2 (Two) Times a Day., Disp: 1 each, Rfl: 11    cefdinir (OMNICEF) 300 MG capsule, See Instructions, Twice daily for infection., # 20 tab(s), 0 Refill(s), Pharmacy: [facility]/pharmacy #[id number], Twice daily for infection., 160.02, cm, 05/18/22 9:12:00 EDT, Height, 60.8, kg, 12/09/23 10:56:00 EST, Weight Dosing, Disp: , Rfl:     cetirizine (zyrTEC) 10 MG tablet, Take 1 tablet by mouth Daily., Disp: , Rfl:     denosumab (Prolia) 60 MG/ML solution prefilled syringe syringe, , Disp: , Rfl:     dilTIAZem (Tiadylt ER) 120 MG 24 hr capsule, TAKE 1 CAPSULE BY MOUTH DAILY. STOP AMLODIPINE, Disp: 90 capsule, Rfl: 1    guaiFENesin (MUCINEX) 600 MG 12 hr tablet, Take 1 tablet by mouth 2 (Two) Times a Day., Disp: , Rfl:     hydroxychloroquine (PLAQUENIL) 200 MG tablet, Take  by mouth 2 (Two) Times a Day., Disp: , Rfl:     levalbuterol (XOPENEX HFA) 45 MCG/ACT inhaler, Inhale 2 puffs Every 6 (Six) Hours As Needed., Disp: , Rfl:     losartan (COZAAR) 100 MG tablet, TAKE 1 TABLET BY MOUTH EVERY DAY, Disp: 90 tablet, Rfl: 1    montelukast (SINGULAIR) 10 MG tablet, TAKE 1 TABLET BY MOUTH EVERY DAY, Disp: 90 tablet, Rfl: 1    omeprazole (priLOSEC) 20 MG capsule, Take 1 capsule by mouth Daily., Disp: , Rfl:     predniSONE (DELTASONE) 5 MG tablet, Take 0.5 tablets by mouth Every Other  "Day., Disp: , Rfl:     rosuvastatin (CRESTOR) 5 MG tablet, Take 1 tablet by mouth Every Other Day., Disp: 90 tablet, Rfl: 0    Vital Signs:      /76 (BP Location: Left arm, Patient Position: Sitting, Cuff Size: Small Adult)   Pulse 86   Temp 97.3 °F (36.3 °C) (Infrared)   Resp 16   Ht 157.5 cm (62\")   Wt 60.8 kg (134 lb)   SpO2 97%   BMI 24.51 kg/m²     Vitals:    12/27/23 1116   BP: 132/76   BP Location: Left arm   Patient Position: Sitting   Cuff Size: Small Adult   Pulse: 86   Resp: 16   Temp: 97.3 °F (36.3 °C)   TempSrc: Infrared   SpO2: 97%   Weight: 60.8 kg (134 lb)   Height: 157.5 cm (62\")      Physical Exam  Vitals and nursing note reviewed.   Constitutional:       Appearance: She is well-developed.   Cardiovascular:      Rate and Rhythm: Normal rate and regular rhythm.      Heart sounds: Normal heart sounds. No murmur heard.     No friction rub. No gallop.   Pulmonary:      Effort: Pulmonary effort is normal.      Breath sounds: Normal breath sounds. No wheezing or rales.   Abdominal:      General: Bowel sounds are normal.      Palpations: Abdomen is soft.      Tenderness: There is no abdominal tenderness.   Musculoskeletal:      Comments: Left ankle with some soft tissue swelling ecchymosis inferior to lateral malleolus.  Stable mortise.   Skin:     General: Skin is warm and dry.   Neurological:      Mental Status: She is alert.   Psychiatric:         Mood and Affect: Mood normal.         Behavior: Behavior normal.         Thought Content: Thought content normal.         Judgment: Judgment normal.        Result Review :       CT chest for lung nodules    IMPRESSION:  Impression:  1. Left lower lobe 9 mm nodule decreased in size now 6 mm compatible with benign etiology. Additional stable 3 mm left upper lobe nodule is stable. No new or enlarging nodule.  2. Stable chronic cylindrical bronchiectasis involving the lower lobes, lingula and right middle lobe.  3. Peripheral tree-in-bud groundglass " opacities in lower lobes similar to prior study, correlate for findings of superimposed endobronchial infection or inflammation.  4. Atherosclerotic disease with coronary artery calcifications and calcifications resulting in short segment moderate to severe stenosis of the left subclavian artery.                 PHQ-9 Total Score:        Reviewed CT extensively with patient.  Will do referrals as discussed and additional testing.  Follow-up here as scheduled or needed if sooner appointment is needed     Assessment and Plan    Diagnoses and all orders for this visit:    1. Bronchiectasis without complication (Primary)  Assessment & Plan:  Continue current medication.  Discussed CT findings that are chronic.  Continue follow-up with pulmonology      2. Acute left ankle pain  -     XR Ankle 3+ View Left; Future    3. Benign hypertension  Assessment & Plan:  Hypertension is improving with treatment.  Continue current treatment regimen.  Blood pressure will be reassessed at the next regular appointment.      4. Hypertensive heart disease without heart failure    5. Mild persistent asthma without complication    6. Sprain of left ankle, unspecified ligament, initial encounter  Comments:  X-ray today likely no fracture.    7. Coronary artery calcification seen on CT scan  Comments:  Discussed results of chest CT.  Will order coronary calcium CT and referral to cardiology  Orders:  -     Ambulatory Referral to Cardiology  -     CT Cardiac Calcium Score Without Dye; Future    8. Subclavian arterial stenosis  -     Ambulatory Referral to Cardiology  -     CT Cardiac Calcium Score Without Dye; Future         Problem List Items Addressed This Visit          Active Problems    Asthma    Benign hypertension    Current Assessment & Plan     Hypertension is improving with treatment.  Continue current treatment regimen.  Blood pressure will be reassessed at the next regular appointment.         Hypertensive heart disease    Overview      Mild on 12/09 echo.         Bronchiectasis without complication - Primary    Current Assessment & Plan     Continue current medication.  Discussed CT findings that are chronic.  Continue follow-up with pulmonology         Coronary artery calcification seen on CT scan    Relevant Orders    Ambulatory Referral to Cardiology    CT Cardiac Calcium Score Without Dye     Other Visit Diagnoses       Acute left ankle pain        Relevant Orders    XR Ankle 3+ View Left    Sprain of left ankle, unspecified ligament, initial encounter        X-ray today likely no fracture.    Subclavian arterial stenosis        Relevant Orders    Ambulatory Referral to Cardiology    CT Cardiac Calcium Score Without Dye            Follow Up   No follow-ups on file.  Patient was given instructions and counseling regarding her condition or for health maintenance advice. Please see specific information pulled into the AVS if appropriate.

## 2023-12-28 PROBLEM — I25.10 CORONARY ARTERY CALCIFICATION SEEN ON CT SCAN: Status: ACTIVE | Noted: 2023-12-28

## 2023-12-28 NOTE — ASSESSMENT & PLAN NOTE
Continue current medication.  Discussed CT findings that are chronic.  Continue follow-up with pulmonology

## 2024-01-04 ENCOUNTER — TELEPHONE (OUTPATIENT)
Dept: CARDIAC SURGERY | Facility: CLINIC | Age: 79
End: 2024-01-04
Payer: MEDICARE

## 2024-01-09 ENCOUNTER — OFFICE VISIT (OUTPATIENT)
Dept: CARDIOLOGY | Facility: CLINIC | Age: 79
End: 2024-01-09
Payer: MEDICARE

## 2024-01-09 VITALS
BODY MASS INDEX: 24.29 KG/M2 | HEART RATE: 90 BPM | HEIGHT: 62 IN | DIASTOLIC BLOOD PRESSURE: 80 MMHG | WEIGHT: 132 LBS | OXYGEN SATURATION: 98 % | SYSTOLIC BLOOD PRESSURE: 137 MMHG

## 2024-01-09 DIAGNOSIS — R94.31 ABNORMAL EKG: ICD-10-CM

## 2024-01-09 DIAGNOSIS — I73.9 PERIPHERAL ARTERIAL DISEASE: ICD-10-CM

## 2024-01-09 DIAGNOSIS — R06.02 SHORTNESS OF BREATH: Primary | ICD-10-CM

## 2024-01-09 DIAGNOSIS — I10 PRIMARY HYPERTENSION: ICD-10-CM

## 2024-01-09 DIAGNOSIS — E78.00 PURE HYPERCHOLESTEROLEMIA: ICD-10-CM

## 2024-01-09 PROCEDURE — 1160F RVW MEDS BY RX/DR IN RCRD: CPT | Performed by: INTERNAL MEDICINE

## 2024-01-09 PROCEDURE — 3075F SYST BP GE 130 - 139MM HG: CPT | Performed by: INTERNAL MEDICINE

## 2024-01-09 PROCEDURE — 3079F DIAST BP 80-89 MM HG: CPT | Performed by: INTERNAL MEDICINE

## 2024-01-09 PROCEDURE — 1159F MED LIST DOCD IN RCRD: CPT | Performed by: INTERNAL MEDICINE

## 2024-01-09 NOTE — PROGRESS NOTES
"    Subjective:     Encounter Date:01/09/2024      Patient ID: Cara Castañeda is a 78 y.o. female.    Chief Complaint:  History of Present Illness 78-year-old white female with history of hypertension hyperlipidemia presents to my office for a new consultation.  Patient has been having symptoms of shortness of breath of increased severity for the last several days.  She had a CT scan for the same reason and was noted to have excessive calcium on her heart arteries and hence a cardiology referral was made.  No complaint of any chest pain.  No PND orthopnea.  No palpitation dizziness syncope or she has some swelling of the feet.  She is taking her medicines regularly.  She does not smoke.  /80   Pulse 90   Ht 157.5 cm (62.01\")   Wt 59.9 kg (132 lb)   SpO2 98%   BMI 24.14 kg/m²     The following portions of the patient's history were reviewed and updated as appropriate: allergies, current medications, past family history, past medical history, past social history, past surgical history, and problem list.  Past Medical History:   Diagnosis Date    Allergic rhinitis     on immunotherapy- Dr. Benito     Asthma     Dr. Benito    Bruit of left carotid artery 02/20/2020    Carotid stenosis     <50%    Cold sore     Gallstone pancreatitis 2006    GERD (gastroesophageal reflux disease)     Dr. Oliva    Hiatal hernia     small, sliding     Hyperlipidemia     Hypertension     Left ventricular hypertrophy     Osteoarthritis     Dr. Avlia    Osteopenia with high risk of fracture     Other long term (current) drug therapy 01/29/2019    Pneumonia March, April, May,2022    Pre-diabetes     Pseudogout     Vocal cord nodule     Dr. Nieves     Past Surgical History:   Procedure Laterality Date    APPENDECTOMY      CHOLECYSTECTOMY  05/2006    Dr. Hernandez    ENDOSCOPY  2017    & Dilation. Dr. Oliva    ERCP  05/2006    Dr. Oliva    HYSTERECTOMY  1974    ovaries remain, for benign reasons    OTHER SURGICAL HISTORY  " 2009    Stress Echo. No ischemia. EF 60-65%.     TONSILLECTOMY AND ADENOIDECTOMY      TOTAL KNEE ARTHROPLASTY  08/31/2017     Social History     Socioeconomic History    Marital status:    Tobacco Use    Smoking status: Never     Passive exposure: Never    Smokeless tobacco: Never   Vaping Use    Vaping Use: Never used   Substance and Sexual Activity    Alcohol use: No    Drug use: No    Sexual activity: Not Currently     Partners: Male     Family History   Problem Relation Age of Onset    Hypertension Mother     Hypertension Father     Heart disease Father     Osteoarthritis Father     Rheum arthritis Brother     Prostate cancer Paternal Grandfather     Breast cancer Neg Hx     Ovarian cancer Neg Hx        Current Outpatient Medications:     acetaminophen (TYLENOL) 325 MG tablet, Take 2 tablets by mouth Every 6 (Six) Hours As Needed., Disp: , Rfl:     aspirin 81 MG EC tablet, Take 1 tablet by mouth Daily. (Patient taking differently: Take 1 tablet by mouth Every Other Day.), Disp: 100 tablet, Rfl: 0    budesonide-formoterol (SYMBICORT) 160-4.5 MCG/ACT inhaler, Inhale 2 puffs 2 (Two) Times a Day., Disp: 1 each, Rfl: 11    cetirizine (zyrTEC) 10 MG tablet, Take 1 tablet by mouth Daily., Disp: , Rfl:     denosumab (Prolia) 60 MG/ML solution prefilled syringe syringe, , Disp: , Rfl:     dilTIAZem (Tiadylt ER) 120 MG 24 hr capsule, TAKE 1 CAPSULE BY MOUTH DAILY. STOP AMLODIPINE, Disp: 90 capsule, Rfl: 1    guaiFENesin (MUCINEX) 600 MG 12 hr tablet, Take 1 tablet by mouth 2 (Two) Times a Day., Disp: , Rfl:     hydroxychloroquine (PLAQUENIL) 200 MG tablet, Take  by mouth 2 (Two) Times a Day., Disp: , Rfl:     levalbuterol (XOPENEX HFA) 45 MCG/ACT inhaler, Inhale 2 puffs Every 6 (Six) Hours As Needed., Disp: , Rfl:     losartan (COZAAR) 100 MG tablet, TAKE 1 TABLET BY MOUTH EVERY DAY, Disp: 90 tablet, Rfl: 1    montelukast (SINGULAIR) 10 MG tablet, TAKE 1 TABLET BY MOUTH EVERY DAY, Disp: 90 tablet, Rfl: 1     omeprazole (priLOSEC) 20 MG capsule, Take 1 capsule by mouth Daily., Disp: , Rfl:     rosuvastatin (CRESTOR) 5 MG tablet, Take 1 tablet by mouth Every Other Day., Disp: 90 tablet, Rfl: 0  Allergies   Allergen Reactions    Aspirin Other (See Comments)     bruising    Crestor [Rosuvastatin Calcium] Myalgia    Levaquin [Levofloxacin] Myalgia     tendonitis    Albuterol Sulfate Anxiety    Atorvastatin Calcium Nausea Only    Sulfa Antibiotics Nausea Only    Tolterodine Nausea Only     Detrol       Review of Systems   Constitutional: Positive for malaise/fatigue.   Cardiovascular:  Positive for leg swelling. Negative for chest pain, dyspnea on exertion and palpitations.   Respiratory:  Positive for shortness of breath. Negative for cough.    Gastrointestinal:  Negative for abdominal pain, nausea and vomiting.   Neurological:  Negative for dizziness, focal weakness, headaches, light-headedness and numbness.   All other systems reviewed and are negative.             Objective:     Constitutional:       Appearance: Well-developed.   Eyes:      General: No scleral icterus.     Conjunctiva/sclera: Conjunctivae normal.   HENT:      Head: Normocephalic and atraumatic.   Neck:      Vascular: No carotid bruit or JVD.   Pulmonary:      Effort: Pulmonary effort is normal.      Breath sounds: Normal breath sounds. No wheezing. No rales.   Cardiovascular:      Normal rate. Regular rhythm.   Pulses:     Intact distal pulses.   Edema:     Peripheral edema present.  Abdominal:      General: Bowel sounds are normal.      Palpations: Abdomen is soft.   Musculoskeletal:      Cervical back: Normal range of motion and neck supple. Skin:     General: Skin is warm and dry.      Findings: No rash.   Neurological:      Mental Status: Alert.           ECG 12 Lead    Date/Time: 1/9/2024 12:37 PM  Performed by: Mark Byers MD    Authorized by: Mark Byers MD  Comments: Sinus rhythm  Septal MI old  Nonspecific ST segment abnormality  Abnormal  EKG  No previous ECGs available          Lab Review:       Assessment:          Diagnosis Plan   1. Shortness of breath        2. Primary hypertension        3. Pure hypercholesterolemia        4. Abnormal EKG        5. Peripheral arterial disease               Plan:       Patient presented with shortness of breath and has an abnormal EKG and risk factors for coronary disease  Patient will have a stress Myoview study to rule out ischemia  Patient had an echocardiogram recently which showed normal LV function  Patient blood pressure currently stable on losartan and diltiazem  Patient is also on Crestor for hyperlipidemia.

## 2024-01-10 ENCOUNTER — PATIENT ROUNDING (BHMG ONLY) (OUTPATIENT)
Dept: CARDIOLOGY | Facility: CLINIC | Age: 79
End: 2024-01-10
Payer: MEDICARE

## 2024-01-10 NOTE — PROGRESS NOTES
A My-Chart message has been sent to the patient for PATIENT ROUNDING with [facility]

## 2024-01-17 ENCOUNTER — HOSPITAL ENCOUNTER (OUTPATIENT)
Dept: CARDIOLOGY | Facility: HOSPITAL | Age: 79
Discharge: HOME | End: 2024-01-17
Payer: MEDICARE

## 2024-01-17 DIAGNOSIS — I73.9 PERIPHERAL ARTERIAL DISEASE: ICD-10-CM

## 2024-01-17 DIAGNOSIS — R94.31 ABNORMAL EKG: ICD-10-CM

## 2024-01-17 DIAGNOSIS — R06.02 SHORTNESS OF BREATH: ICD-10-CM

## 2024-01-17 DIAGNOSIS — E78.00 PURE HYPERCHOLESTEROLEMIA: ICD-10-CM

## 2024-01-17 DIAGNOSIS — I10 PRIMARY HYPERTENSION: ICD-10-CM

## 2024-01-17 LAB
BH CV REST NUCLEAR ISOTOPE DOSE: 10.5 MCI
BH CV STRESS COMMENTS STAGE 1: NORMAL
BH CV STRESS DOSE REGADENOSON STAGE 1: 0.4
BH CV STRESS DURATION MIN STAGE 1: 0
BH CV STRESS DURATION SEC STAGE 1: 10
BH CV STRESS NUCLEAR ISOTOPE DOSE: 32.5 MCI
BH CV STRESS PROTOCOL 1: NORMAL
BH CV STRESS RECOVERY BP: NORMAL MMHG
BH CV STRESS RECOVERY HR: 105 BPM
BH CV STRESS STAGE 1: 1
LV EF NUC BP: 72 %
MAXIMAL PREDICTED HEART RATE: 140 BPM
STRESS BASELINE BP: NORMAL MMHG
STRESS BASELINE HR: 87 BPM
STRESS TARGET HR: 119 BPM

## 2024-01-17 PROCEDURE — 78452 HT MUSCLE IMAGE SPECT MULT: CPT

## 2024-01-17 PROCEDURE — A9500 TC99M SESTAMIBI: HCPCS | Performed by: INTERNAL MEDICINE

## 2024-01-17 PROCEDURE — 25010000002 REGADENOSON 0.4 MG/5ML SOLUTION: Performed by: INTERNAL MEDICINE

## 2024-01-17 PROCEDURE — 93017 CV STRESS TEST TRACING ONLY: CPT

## 2024-01-17 PROCEDURE — 0 TECHNETIUM SESTAMIBI: Performed by: INTERNAL MEDICINE

## 2024-01-17 RX ORDER — REGADENOSON 0.08 MG/ML
0.4 INJECTION, SOLUTION INTRAVENOUS
Status: COMPLETED | OUTPATIENT
Start: 2024-01-17 | End: 2024-01-17

## 2024-01-17 RX ADMIN — TECHNETIUM TC 99M SESTAMIBI 1 DOSE: 1 INJECTION INTRAVENOUS at 14:03

## 2024-01-17 RX ADMIN — TECHNETIUM TC 99M SESTAMIBI 1 DOSE: 1 INJECTION INTRAVENOUS at 12:29

## 2024-01-17 RX ADMIN — REGADENOSON 0.4 MG: 0.08 INJECTION, SOLUTION INTRAVENOUS at 14:03

## 2024-02-06 ENCOUNTER — HOSPITAL ENCOUNTER (OUTPATIENT)
Dept: CT IMAGING | Facility: HOSPITAL | Age: 79
Discharge: HOME | End: 2024-02-06
Admitting: NURSE PRACTITIONER

## 2024-02-06 DIAGNOSIS — I25.10 CORONARY ARTERY CALCIFICATION SEEN ON CT SCAN: ICD-10-CM

## 2024-02-06 DIAGNOSIS — I77.1 SUBCLAVIAN ARTERIAL STENOSIS: ICD-10-CM

## 2024-02-06 PROCEDURE — 75571 CT HRT W/O DYE W/CA TEST: CPT

## 2024-02-07 DIAGNOSIS — K76.9 LIVER LESION: Primary | ICD-10-CM

## 2024-02-19 ENCOUNTER — HOSPITAL ENCOUNTER (OUTPATIENT)
Dept: ULTRASOUND IMAGING | Facility: HOSPITAL | Age: 79
Discharge: HOME | End: 2024-02-19
Admitting: NURSE PRACTITIONER
Payer: MEDICARE

## 2024-02-19 DIAGNOSIS — K76.9 LIVER LESION: ICD-10-CM

## 2024-02-19 PROCEDURE — 76705 ECHO EXAM OF ABDOMEN: CPT

## 2024-03-04 ENCOUNTER — OFFICE VISIT (OUTPATIENT)
Dept: PULMONOLOGY | Facility: HOSPITAL | Age: 79
End: 2024-03-04
Payer: MEDICARE

## 2024-03-04 VITALS
OXYGEN SATURATION: 99 % | HEART RATE: 84 BPM | WEIGHT: 132 LBS | HEIGHT: 62 IN | BODY MASS INDEX: 24.29 KG/M2 | DIASTOLIC BLOOD PRESSURE: 82 MMHG | RESPIRATION RATE: 14 BRPM | SYSTOLIC BLOOD PRESSURE: 153 MMHG

## 2024-03-04 DIAGNOSIS — J45.30 MILD PERSISTENT ASTHMA WITHOUT COMPLICATION: Primary | ICD-10-CM

## 2024-03-04 DIAGNOSIS — J47.9 BRONCHIECTASIS WITHOUT COMPLICATION: ICD-10-CM

## 2024-03-04 PROCEDURE — G0463 HOSPITAL OUTPT CLINIC VISIT: HCPCS

## 2024-03-04 RX ORDER — PREDNISONE 5 MG/1
1 TABLET ORAL DAILY
COMMUNITY
Start: 2024-02-23

## 2024-03-04 NOTE — PROGRESS NOTES
SLEEP/PULMONARY  CLINIC NOTE      PATIENT IDENTIFICATION:  Name: Cara Castañeda  Age: 78 y.o.  Sex: female  :  1945  MRN: [id number]    DATE OF CONSULTATION:  3/4/2024                     CHIEF COMPLAINT: Follow-up on asthma    History of Present Illness:   Cara Castañeda is a 78 y.o. female patient with asthma and lung nodule, bronchiectasis did have a CAT scan was done in January and showed the patient is chronic bronchiectasis, but that nodule is decreased in size to 6 mm, patient feels significant better less shortness of breath less coughing less wheezing using her Symbicort      Review of Systems:   Constitutional: negative   Eyes: negative   ENT/oropharynx: negative   Cardiovascular: negative   Respiratory: negative   Gastrointestinal: negative   Genitourinary: negative   Neurological: negative   Musculoskeletal: negative   Integument/breast: negative   Endocrine: negative   Allergic/Immunologic: negative     Past Medical History:  Past Medical History:   Diagnosis Date    Allergic rhinitis     on immunotherapy- Dr. Benito     Asthma     Dr. Benito    Bruit of left carotid artery 2020    Carotid stenosis     <50%    Cold sore     Gallstone pancreatitis 2006    GERD (gastroesophageal reflux disease)     Dr. Oliva    Hiatal hernia     small, sliding     Hyperlipidemia     Hypertension     Left ventricular hypertrophy     Osteoarthritis     Dr. Avila    Osteopenia with high risk of fracture     Other long term (current) drug therapy 2019    Pneumonia March, April, May,2022    Pre-diabetes     Pseudogout     Vocal cord nodule     Dr. Nieves       Past Surgical History:  Past Surgical History:   Procedure Laterality Date    APPENDECTOMY      CHOLECYSTECTOMY  2006    Dr. Hernandez    ENDOSCOPY  2017    & Dilation. Dr. Oliva    ERCP  2006    Dr. Oliva    HYSTERECTOMY  1974    ovaries remain, for benign reasons    OTHER SURGICAL HISTORY      Stress Echo. No ischemia. EF  60-65%.     TONSILLECTOMY AND ADENOIDECTOMY      TOTAL KNEE ARTHROPLASTY  08/31/2017        Family History:  Family History   Problem Relation Age of Onset    Hypertension Mother     Hypertension Father     Heart disease Father     Osteoarthritis Father     Rheum arthritis Brother     Prostate cancer Paternal Grandfather     Breast cancer Neg Hx     Ovarian cancer Neg Hx         Social History:   Social History     Tobacco Use    Smoking status: Never     Passive exposure: Never    Smokeless tobacco: Never   Substance Use Topics    Alcohol use: No        Allergies:  Allergies   Allergen Reactions    Aspirin Other (See Comments)     bruising    Crestor [Rosuvastatin Calcium] Myalgia    Levaquin [Levofloxacin] Myalgia     tendonitis    Albuterol Sulfate Anxiety    Atorvastatin Calcium Nausea Only    Sulfa Antibiotics Nausea Only    Tolterodine Nausea Only     Detrol       Home Meds:  (Not in a hospital admission)      Objective:    Vitals Ranges:   Heart Rate:  [84] 84  Resp:  [14] 14  BP: (153)/(82) 153/82  Body mass index is 24.14 kg/m².     Exam:  General Appearance:  WDWN    HEENT:   without obvious abnormality,  Conjunctiva/corneas clear,  Normal external ear canals, no drainage    Clear orsalmucosa,  Mallampati score 3    Neck:  Supple, symmetrical, trachea midline. No JVD.  Lungs:   Bilateral basal rhonchi bilaterally, respirations unlabored symmetrical wall movement.    Chest wall:  No tenderness or deformity.    Heart:  Regular rate and rhythm, S1 and S2 normal.  Extremities: Trace edema no clubbing or Cyanosis        Data Review:  All labs (24hrs): No results found for this or any previous visit (from the past 24 hour(s)).     Imaging:  US Abdomen Limited  Narrative: US ABDOMEN LIMITED    Date of Exam: 2/19/2024 8:25 AM EST    Indication: ? liver cyst on ct chest.    Comparison: 2/6/2024    Technique: Grayscale and color Doppler ultrasound evaluation of the right upper quadrant was  performed.    Findings:  Limited visualization of the pancreatic head and neck is grossly unremarkable. There is a 1.1 cm anechoic cyst in the right hepatic lobe. The second lesion noted in the right hepatic lobe on comparison CT chest is not evident sonographically. Liver is   otherwise unremarkable. There is appropriate flow waveform and direction in the portal and hepatic veins. Common bile duct measures 5 mm. Gallbladder surgically absent. Right kidney has a normal appearance and measures 10 cm in length.  Impression: Impression:    1. Only one of the hepatic lesions noted on comparison CT scan is sonographically evident, but has characteristics of a simple cyst.    Electronically Signed: Gabriel Benito MD    2/19/2024 3:49 PM EST    Workstation ID: [id number]       ASSESSMENT:  Diagnoses and all orders for this visit:    Mild persistent asthma without complication    Bronchiectasis without complication    Other orders  -     predniSONE (DELTASONE) 5 MG tablet; Take 1 tablet by mouth Daily.    Lung nodule    PLAN:  No need for any further CAT scan at this time  Bronchodilator inhaled corticosteroid    Education how to use inhalers    Encouraged to use incentive spirometer    Continue to exercise slowly as tolerated    Monitor for any change in the color of the sputum    Avoid any exposure to fumes, gas or any irritant        Follow-up 6 months    Natty Hodge MD. D, ABSM.  3/4/2024  13:55 EST  6-month

## 2024-04-15 RX ORDER — MONTELUKAST SODIUM 10 MG/1
10 TABLET ORAL DAILY
Qty: 90 TABLET | Refills: 1 | Status: SHIPPED | OUTPATIENT
Start: 2024-04-15

## 2024-04-24 ENCOUNTER — TELEPHONE (OUTPATIENT)
Dept: FAMILY MEDICINE CLINIC | Facility: CLINIC | Age: 79
End: 2024-04-24
Payer: MEDICARE

## 2024-04-24 DIAGNOSIS — E53.8 VITAMIN B12 DEFICIENCY: ICD-10-CM

## 2024-04-24 DIAGNOSIS — E78.5 HYPERLIPIDEMIA, UNSPECIFIED HYPERLIPIDEMIA TYPE: ICD-10-CM

## 2024-04-24 DIAGNOSIS — I10 BENIGN HYPERTENSION: Primary | ICD-10-CM

## 2024-04-24 DIAGNOSIS — R73.03 PREDIABETES: ICD-10-CM

## 2024-04-24 RX ORDER — TIMOLOL MALEATE 5 MG/ML
SOLUTION/ DROPS OPHTHALMIC
COMMUNITY
Start: 2024-04-18

## 2024-04-24 NOTE — TELEPHONE ENCOUNTER
Patient advised to get fasting lab work done at [facility] at least 3 days prior to being seen in office if able.

## 2024-04-27 LAB
ALBUMIN SERPL-MCNC: 4.1 G/DL (ref 3.8–4.8)
ALBUMIN/GLOB SERPL: 1.9 {RATIO} (ref 1.2–2.2)
ALP SERPL-CCNC: 68 IU/L (ref 44–121)
ALT SERPL-CCNC: 13 IU/L (ref 0–32)
AST SERPL-CCNC: 20 IU/L (ref 0–40)
BASOPHILS # BLD AUTO: 0.1 X10E3/UL (ref 0–0.2)
BASOPHILS NFR BLD AUTO: 2 %
BILIRUB SERPL-MCNC: 0.3 MG/DL (ref 0–1.2)
BUN SERPL-MCNC: 13 MG/DL (ref 8–27)
BUN/CREAT SERPL: 14 (ref 12–28)
CALCIUM SERPL-MCNC: 10.4 MG/DL (ref 8.7–10.3)
CHLORIDE SERPL-SCNC: 100 MMOL/L (ref 96–106)
CHOLEST SERPL-MCNC: 206 MG/DL (ref 100–199)
CHOLEST/HDLC SERPL: 3.3 RATIO (ref 0–4.4)
CO2 SERPL-SCNC: 25 MMOL/L (ref 20–29)
CREAT SERPL-MCNC: 0.94 MG/DL (ref 0.57–1)
EGFRCR SERPLBLD CKD-EPI 2021: 61 ML/MIN/1.73
EOSINOPHIL # BLD AUTO: 0.3 X10E3/UL (ref 0–0.4)
EOSINOPHIL NFR BLD AUTO: 4 %
ERYTHROCYTE [DISTWIDTH] IN BLOOD BY AUTOMATED COUNT: 14.2 % (ref 11.7–15.4)
GLOBULIN SER CALC-MCNC: 2.2 G/DL (ref 1.5–4.5)
GLUCOSE SERPL-MCNC: 88 MG/DL (ref 70–99)
HBA1C MFR BLD: 5.8 % (ref 4.8–5.6)
HCT VFR BLD AUTO: 42.6 % (ref 34–46.6)
HDLC SERPL-MCNC: 63 MG/DL
HGB BLD-MCNC: 13.1 G/DL (ref 11.1–15.9)
IMM GRANULOCYTES # BLD AUTO: 0 X10E3/UL (ref 0–0.1)
IMM GRANULOCYTES NFR BLD AUTO: 0 %
LDLC SERPL CALC-MCNC: 106 MG/DL (ref 0–99)
LYMPHOCYTES # BLD AUTO: 1.2 X10E3/UL (ref 0.7–3.1)
LYMPHOCYTES NFR BLD AUTO: 17 %
MCH RBC QN AUTO: 26.1 PG (ref 26.6–33)
MCHC RBC AUTO-ENTMCNC: 30.8 G/DL (ref 31.5–35.7)
MCV RBC AUTO: 85 FL (ref 79–97)
MONOCYTES # BLD AUTO: 0.7 X10E3/UL (ref 0.1–0.9)
MONOCYTES NFR BLD AUTO: 9 %
NEUTROPHILS # BLD AUTO: 5 X10E3/UL (ref 1.4–7)
NEUTROPHILS NFR BLD AUTO: 68 %
PLATELET # BLD AUTO: 318 X10E3/UL (ref 150–450)
POTASSIUM SERPL-SCNC: 4.2 MMOL/L (ref 3.5–5.2)
PROT SERPL-MCNC: 6.3 G/DL (ref 6–8.5)
RBC # BLD AUTO: 5.01 X10E6/UL (ref 3.77–5.28)
SODIUM SERPL-SCNC: 138 MMOL/L (ref 134–144)
TRIGL SERPL-MCNC: 218 MG/DL (ref 0–149)
VIT B12 SERPL-MCNC: 1129 PG/ML (ref 232–1245)
VLDLC SERPL CALC-MCNC: 37 MG/DL (ref 5–40)
WBC # BLD AUTO: 7.3 X10E3/UL (ref 3.4–10.8)

## 2024-04-28 NOTE — PROGRESS NOTES
The ABCs of the Annual Wellness Visit  Subsequent Medicare Wellness Visit    Subjective    Cara Castañeda is a 78 y.o. female who presents for a Subsequent Medicare Wellness Visit.    The following portions of the patient's history were reviewed and   updated as appropriate: allergies, current medications, past family history, past medical history, past social history, past surgical history, and problem list.    Compared to one year ago, the patient feels her physical   health is the same.    Compared to one year ago, the patient feels her mental   health is the same.    Recent Hospitalizations:  She was not admitted to the hospital during the last year.       Current Medical Providers:  Patient Care Team:  Rocio Apodaca APRN as PCP - General (Nurse Practitioner)  Fernando Oliva MD as Consulting Physician (Gastroenterology)  Natty Hodge MD as Consulting Physician (Pulmonary Disease)  Mark Byers MD as Consulting Physician (Cardiology)  Marcin Benito MD as Consulting Physician (Allergy)    Outpatient Medications Prior to Visit   Medication Sig Dispense Refill    acetaminophen (TYLENOL) 325 MG tablet Take 2 tablets by mouth Every 6 (Six) Hours As Needed.      aspirin 81 MG EC tablet Take 1 tablet by mouth Daily. 100 tablet 0    budesonide-formoterol (SYMBICORT) 160-4.5 MCG/ACT inhaler Inhale 2 puffs 2 (Two) Times a Day. 1 each 11    cetirizine (zyrTEC) 10 MG tablet Take 1 tablet by mouth Daily.      denosumab (Prolia) 60 MG/ML solution prefilled syringe syringe       guaiFENesin (MUCINEX) 600 MG 12 hr tablet Take 1 tablet by mouth 2 (Two) Times a Day.      hydroxychloroquine (PLAQUENIL) 200 MG tablet Take  by mouth 2 (Two) Times a Day.      levalbuterol (XOPENEX HFA) 45 MCG/ACT inhaler Inhale 2 puffs Every 6 (Six) Hours As Needed.      losartan (COZAAR) 100 MG tablet TAKE 1 TABLET BY MOUTH EVERY DAY 90 tablet 1    montelukast (SINGULAIR) 10 MG tablet TAKE 1 TABLET BY MOUTH EVERY DAY 90 tablet  1    omeprazole (priLOSEC) 20 MG capsule Take 1 capsule by mouth Daily.      predniSONE (DELTASONE) 5 MG tablet Take 1 tablet by mouth Daily.      rosuvastatin (CRESTOR) 5 MG tablet Take 1 tablet by mouth Every Other Day. 90 tablet 0    Tiadylt  MG 24 hr capsule TAKE 1 CAPSULE BY MOUTH DAILY. STOP AMLODIPINE 90 capsule 1    timolol (TIMOPTIC) 0.5 % ophthalmic solution APPLY 1 DROP BY OPHTHALMIC ROUTE TO BOTH EYES IN THE MORNING       No facility-administered medications prior to visit.       No opioid medication identified on active medication list. I have reviewed chart for other potential  high risk medication/s and harmful drug interactions in the elderly.        Aspirin is on active medication list. Aspirin use is indicated based on review of current medical condition/s. Pros and cons of this therapy have been discussed today. Benefits of this medication outweigh potential harm.  Patient has been encouraged to continue taking this medication.  .      Patient Active Problem List   Diagnosis    Allergic rhinitis    Asthma    Benign hypertension    Gastroesophageal reflux disease    Generalized osteoarthritis    Hyperlipidemia    Hypertensive heart disease    Prediabetes    Insomnia    Other long term (current) drug therapy    Osteopenia with high risk of fracture    Mixed stress and urge urinary incontinence    Dry eye syndrome of bilateral lacrimal glands    Bilateral pseudophakia    Hypermetropia, bilateral    Pseudophakia    Regular astigmatism, left eye    Presence of intraocular lens    Epiretinal membrane (ERM) of left eye    Puckering of macula, bilateral    Tear film insufficiency    Bruit of left carotid artery    Cystocele with rectocele    Palpitations    Bronchiectasis without complication    BMI 24.0-24.9, adult    Right sided weakness    Mitral annular calcification    TIA (transient ischemic attack)    Polyarthralgia    Pseudogout    Coronary artery calcification seen on CT scan     Advance  "Care Planning   Advance Care Planning     Advance Directive is on file.  ACP discussion was held with the patient during this visit. Patient has an advance directive in EMR which is still valid.      Objective    Vitals:    24 0854 24 0915   BP: 148/74 138/84   BP Location: Left arm    Patient Position: Sitting    Cuff Size: Small Adult    Pulse: 65    Resp: 16    Temp: 97.5 °F (36.4 °C)    TempSrc: Infrared    SpO2: 98%    Weight: 61.7 kg (136 lb)    Height: 157.5 cm (62\")      Estimated body mass index is 24.87 kg/m² as calculated from the following:    Height as of this encounter: 157.5 cm (62\").    Weight as of this encounter: 61.7 kg (136 lb).    BMI is within normal parameters. No other follow-up for BMI required.      Does the patient have evidence of cognitive impairment? No    Lab Results   Component Value Date    CHLPL 206 (H) 2024    TRIG 218 (H) 2024    HDL 63 2024     (H) 2024    VLDL 37 2024    HGBA1C 5.8 (H) 2024        HEALTH RISK ASSESSMENT    Smoking Status:  Social History     Tobacco Use   Smoking Status Never    Passive exposure: Never   Smokeless Tobacco Never     Alcohol Consumption:  Social History     Substance and Sexual Activity   Alcohol Use No     Fall Risk Screen:    STEADI Fall Risk Assessment was completed, and patient is at LOW risk for falls.Assessment completed on:2024    Depression Screenin/30/2024     8:55 AM   PHQ-2/PHQ-9 Depression Screening   Little Interest or Pleasure in Doing Things 0-->not at all   Feeling Down, Depressed or Hopeless 0-->not at all   PHQ-9: Brief Depression Severity Measure Score 0       Health Habits and Functional and Cognitive Screenin/30/2024     8:55 AM   Functional & Cognitive Status   Do you have difficulty preparing food and eating? No   Do you have difficulty bathing yourself, getting dressed or grooming yourself? No   Do you have difficulty using the toilet? No   Do you " have difficulty moving around from place to place? No   Do you have trouble with steps or getting out of a bed or a chair? No   Current Diet Well Balanced Diet   Dental Exam Up to date   Eye Exam Up to date   Exercise (times per week) 0 times per week   Current Exercises Include No Regular Exercise   Do you need help using the phone?  No   Are you deaf or do you have serious difficulty hearing?  No   Do you need help to go to places out of walking distance? No   Do you need help shopping? No   Do you need help preparing meals?  No   Do you need help with housework?  No   Do you need help with laundry? No   Do you need help taking your medications? No   Do you need help managing money? No   Do you ever drive or ride in a car without wearing a seat belt? No   Have you felt unusual stress, anger or loneliness in the last month? No   Who do you live with? Spouse   If you need help, do you have trouble finding someone available to you? No   Have you been bothered in the last four weeks by sexual problems? No   Do you have difficulty concentrating, remembering or making decisions? No       Age-appropriate Screening Schedule:  Refer to the list below for future screening recommendations based on patient's age, sex and/or medical conditions. Orders for these recommended tests are listed in the plan section. The patient has been provided with a written plan.    Health Maintenance   Topic Date Due    RSV Vaccine - Adults (1 - 1-dose 60+ series) Never done    ZOSTER VACCINE (2 of 3) 03/20/2015    COVID-19 Vaccine (4 - 2023-24 season) 09/01/2023    INFLUENZA VACCINE  08/01/2024    DXA SCAN  04/14/2025    LIPID PANEL  04/26/2025    ANNUAL WELLNESS VISIT  04/30/2025    COLORECTAL CANCER SCREENING  10/13/2025    TDAP/TD VACCINES (4 - Tdap) 01/14/2026    Pneumococcal Vaccine 65+  Completed                  CMS Preventative Services Quick Reference  Risk Factors Identified During Encounter  Immunizations Discussed/Encouraged:  Shingrix, COVID19, and RSV (Respiratory Syncytial Virus)  Polypharmacy: Medication List reviewed  The above risks/problems have been discussed with the patient.  Pertinent information has been shared with the patient in the After Visit Summary.  An After Visit Summary and PPPS were made available to the patient.    Follow Up:   Next Medicare Wellness visit to be scheduled in 1 year.       Additional E&M Note during same encounter follows:  Patient has multiple medical problems which are significant and separately identifiable that require additional work above and beyond the Medicare Wellness Visit.      Chief Complaint  Medicare Wellness-subsequent, Hypertension, Hyperlipidemia, and Prediabetes    Subjective        HPI  Cara Castañeda is also being seen today for Medicare wellness, hypertension, prediabetes and hyperlipidemia.      Patient is here for Medicare wellness hypertension prediabetes and hyperlipidemia.  She is overall doing well.  She is compliant with all her medications.  She follows with a pulmonologist on a regular basis.  We have reviewed her labs today.    Mildly decreased MCH and MCHC.  Hemoglobin hematocrit are normal.  B12 levels normal with supplementation    CMP is normal except for mild elevation in calcium.  Likely dietary related    Cholesterol is fairly well-managed on current medication.  Triglycerides are up mildly to 218.  Watch fat in diet    A1c was 5.8.  Prediabetes range but improved    She has no new complaints.  She is busy and volunteers on a regular basis still.  Review of Systems   Constitutional:  Negative for fatigue and fever.   HENT:  Negative for ear pain, postnasal drip and sinus pressure.    Eyes:  Negative for visual disturbance.   Respiratory:  Positive for shortness of breath. Negative for cough.    Cardiovascular:  Negative for chest pain and palpitations.   Gastrointestinal:  Negative for abdominal pain, constipation, nausea and vomiting.   Genitourinary:   "Negative for dysuria.   Musculoskeletal:  Positive for arthralgias.   Skin:  Negative for rash.   Allergic/Immunologic: Negative for environmental allergies.   Neurological:  Negative for dizziness.   Hematological:  Negative for adenopathy.   Psychiatric/Behavioral:  The patient is not nervous/anxious.        Objective   Vital Signs:  /84   Pulse 65   Temp 97.5 °F (36.4 °C) (Infrared)   Resp 16   Ht 157.5 cm (62\")   Wt 61.7 kg (136 lb)   SpO2 98%   BMI 24.87 kg/m²     Physical Exam  Vitals and nursing note reviewed.   Constitutional:       Appearance: Normal appearance. She is well-developed.   HENT:      Head: Normocephalic and atraumatic.      Right Ear: Tympanic membrane, ear canal and external ear normal. No decreased hearing noted. No drainage, swelling or tenderness.      Left Ear: Tympanic membrane, ear canal and external ear normal. No decreased hearing noted. No drainage, swelling or tenderness.      Nose: Nose normal.      Mouth/Throat:      Mouth: Mucous membranes are moist.      Pharynx: Oropharynx is clear.   Eyes:      General: Lids are normal.      Conjunctiva/sclera: Conjunctivae normal.      Pupils: Pupils are equal, round, and reactive to light.   Neck:      Thyroid: No thyroid mass or thyromegaly.      Vascular: Normal carotid pulses. No carotid bruit.   Cardiovascular:      Rate and Rhythm: Regular rhythm.      Heart sounds: S1 normal and S2 normal. No murmur heard.     No friction rub. No gallop.   Pulmonary:      Effort: Pulmonary effort is normal.      Breath sounds: Normal breath sounds. No wheezing.   Chest:      Chest wall: No tenderness.   Abdominal:      General: Bowel sounds are normal. There is no distension.      Palpations: Abdomen is soft.      Tenderness: There is no abdominal tenderness.      Hernia: No hernia is present.   Musculoskeletal:         General: Normal range of motion.      Cervical back: Full passive range of motion without pain, normal range of motion " and neck supple.      Comments: Some arthritic deformities of hands   Lymphadenopathy:      Head:      Right side of head: No submental, submandibular, tonsillar, preauricular or posterior auricular adenopathy.      Left side of head: No submental, submandibular, tonsillar, preauricular or posterior auricular adenopathy.      Cervical: No cervical adenopathy.      Right cervical: No superficial cervical adenopathy.     Left cervical: No superficial cervical adenopathy.   Skin:     General: Skin is warm and dry.      Comments: Some joint deformity in hands.  No erythema in joints noted today on exam.   Neurological:      General: No focal deficit present.      Mental Status: She is alert and oriented to person, place, and time.      Cranial Nerves: No cranial nerve deficit.      Sensory: No sensory deficit.      Motor: Motor function is intact.      Coordination: Coordination is intact.      Gait: Gait is intact.      Deep Tendon Reflexes: Reflexes normal.   Psychiatric:         Attention and Perception: She is attentive.         Mood and Affect: Mood normal.         Speech: Speech normal.         Behavior: Behavior normal.         Thought Content: Thought content normal.         Cognition and Memory: Cognition normal.         Judgment: Judgment normal.                         Assessment and Plan   Diagnoses and all orders for this visit:    1. Medicare annual wellness visit, subsequent (Primary)  Comments:  Anticipatory guidance was done    2. Screening mammogram, encounter for  -     Mammo Screening Digital Tomosynthesis Bilateral With CAD    3. Allergic rhinitis, unspecified seasonality, unspecified trigger  Assessment & Plan:  Stable on current medication      4. Benign hypertension  Assessment & Plan:  Hypertension is stable and controlled  Continue current treatment regimen.  Blood pressure will be reassessed in 6 months.      5. Mixed hyperlipidemia  Assessment & Plan:   Lipid abnormalities are  stable    Plan:  Continue same medication/s without change.      Discussed medication dosage, use, side effects, and goals of treatment in detail.    Counseled patient on lifestyle modifications to help control hyperlipidemia.     Patient Treatment Goals:   LDL goal is under 100    Followup in 6 months.      6. Hypertensive heart disease without heart failure  Assessment & Plan:  Recent cardiology evaluation      7. Prediabetes  Assessment & Plan:  Prediabetes is stable      8. BMI 24.0-24.9, adult    9. Polyarthralgia  Assessment & Plan:  Currently stable.  Taking Plaquenil and sees rheumatology      10. Osteopenia with high risk of fracture  Assessment & Plan:  Patient is taking Prolia.      11. TIA (transient ischemic attack)  Assessment & Plan:  Aspirin and risk management by controlling cholesterol      12. Bronchiectasis without complication  Assessment & Plan:  Follow-up with pulmonary      13. Mild persistent asthma without complication             Follow Up   Return in about 6 months (around 10/30/2024) for Recheck htn and  hyperlipidemia.  Patient was given instructions and counseling regarding her condition or for health maintenance advice. Please see specific information pulled into the AVS if appropriate.

## 2024-04-29 RX ORDER — DILTIAZEM HYDROCHLORIDE 120 MG/1
CAPSULE, EXTENDED RELEASE ORAL
Qty: 90 CAPSULE | Refills: 1 | Status: SHIPPED | OUTPATIENT
Start: 2024-04-29

## 2024-04-30 ENCOUNTER — OFFICE VISIT (OUTPATIENT)
Dept: FAMILY MEDICINE CLINIC | Facility: CLINIC | Age: 79
End: 2024-04-30
Payer: MEDICARE

## 2024-04-30 VITALS
OXYGEN SATURATION: 98 % | TEMPERATURE: 97.5 F | HEART RATE: 65 BPM | BODY MASS INDEX: 25.03 KG/M2 | RESPIRATION RATE: 16 BRPM | DIASTOLIC BLOOD PRESSURE: 84 MMHG | HEIGHT: 62 IN | WEIGHT: 136 LBS | SYSTOLIC BLOOD PRESSURE: 138 MMHG

## 2024-04-30 DIAGNOSIS — J45.30 MILD PERSISTENT ASTHMA WITHOUT COMPLICATION: ICD-10-CM

## 2024-04-30 DIAGNOSIS — J47.9 BRONCHIECTASIS WITHOUT COMPLICATION: ICD-10-CM

## 2024-04-30 DIAGNOSIS — J30.9 ALLERGIC RHINITIS, UNSPECIFIED SEASONALITY, UNSPECIFIED TRIGGER: ICD-10-CM

## 2024-04-30 DIAGNOSIS — I10 BENIGN HYPERTENSION: ICD-10-CM

## 2024-04-30 DIAGNOSIS — I11.9 HYPERTENSIVE HEART DISEASE WITHOUT HEART FAILURE: ICD-10-CM

## 2024-04-30 DIAGNOSIS — Z00.00 MEDICARE ANNUAL WELLNESS VISIT, SUBSEQUENT: Primary | ICD-10-CM

## 2024-04-30 DIAGNOSIS — Z12.31 SCREENING MAMMOGRAM, ENCOUNTER FOR: ICD-10-CM

## 2024-04-30 DIAGNOSIS — G45.9 TIA (TRANSIENT ISCHEMIC ATTACK): ICD-10-CM

## 2024-04-30 DIAGNOSIS — M85.80 OSTEOPENIA WITH HIGH RISK OF FRACTURE: ICD-10-CM

## 2024-04-30 DIAGNOSIS — R73.03 PREDIABETES: ICD-10-CM

## 2024-04-30 DIAGNOSIS — M25.50 POLYARTHRALGIA: ICD-10-CM

## 2024-04-30 DIAGNOSIS — E78.2 MIXED HYPERLIPIDEMIA: ICD-10-CM

## 2024-04-30 PROCEDURE — 99214 OFFICE O/P EST MOD 30 MIN: CPT | Performed by: NURSE PRACTITIONER

## 2024-04-30 PROCEDURE — 1159F MED LIST DOCD IN RCRD: CPT | Performed by: NURSE PRACTITIONER

## 2024-04-30 PROCEDURE — 1160F RVW MEDS BY RX/DR IN RCRD: CPT | Performed by: NURSE PRACTITIONER

## 2024-04-30 PROCEDURE — 3075F SYST BP GE 130 - 139MM HG: CPT | Performed by: NURSE PRACTITIONER

## 2024-04-30 PROCEDURE — 1170F FXNL STATUS ASSESSED: CPT | Performed by: NURSE PRACTITIONER

## 2024-04-30 PROCEDURE — 3078F DIAST BP <80 MM HG: CPT | Performed by: NURSE PRACTITIONER

## 2024-04-30 PROCEDURE — G0439 PPPS, SUBSEQ VISIT: HCPCS | Performed by: NURSE PRACTITIONER

## 2024-05-10 RX ORDER — LOSARTAN POTASSIUM 100 MG/1
100 TABLET ORAL DAILY
Qty: 90 TABLET | Refills: 1 | Status: SHIPPED | OUTPATIENT
Start: 2024-05-10

## 2024-05-16 ENCOUNTER — HOSPITAL ENCOUNTER (OUTPATIENT)
Dept: MAMMOGRAPHY | Facility: HOSPITAL | Age: 79
Discharge: HOME | End: 2024-05-16
Admitting: NURSE PRACTITIONER
Payer: MEDICARE

## 2024-05-16 PROCEDURE — 77067 SCR MAMMO BI INCL CAD: CPT

## 2024-05-16 PROCEDURE — 77063 BREAST TOMOSYNTHESIS BI: CPT

## 2024-09-09 ENCOUNTER — OFFICE VISIT (OUTPATIENT)
Dept: PULMONOLOGY | Facility: HOSPITAL | Age: 79
End: 2024-09-09
Payer: MEDICARE

## 2024-09-09 VITALS
DIASTOLIC BLOOD PRESSURE: 79 MMHG | OXYGEN SATURATION: 97 % | RESPIRATION RATE: 14 BRPM | WEIGHT: 138 LBS | BODY MASS INDEX: 25.4 KG/M2 | HEART RATE: 79 BPM | SYSTOLIC BLOOD PRESSURE: 154 MMHG | HEIGHT: 62 IN

## 2024-09-09 DIAGNOSIS — J45.30 MILD PERSISTENT ASTHMA WITHOUT COMPLICATION: Primary | ICD-10-CM

## 2024-09-09 DIAGNOSIS — F51.01 PRIMARY INSOMNIA: ICD-10-CM

## 2024-09-09 DIAGNOSIS — J47.9 BRONCHIECTASIS WITHOUT COMPLICATION: ICD-10-CM

## 2024-09-09 DIAGNOSIS — J30.9 ALLERGIC RHINITIS, UNSPECIFIED SEASONALITY, UNSPECIFIED TRIGGER: ICD-10-CM

## 2024-09-09 DIAGNOSIS — R91.1 SOLITARY LUNG NODULE: ICD-10-CM

## 2024-09-09 PROCEDURE — G0463 HOSPITAL OUTPT CLINIC VISIT: HCPCS

## 2024-09-09 RX ORDER — BUDESONIDE 0.5 MG/2ML
0.5 INHALANT ORAL
Qty: 60 EACH | Refills: 11 | Status: SHIPPED | OUTPATIENT
Start: 2024-09-09

## 2024-09-09 RX ORDER — ARFORMOTEROL TARTRATE 15 UG/2ML
15 SOLUTION RESPIRATORY (INHALATION)
Qty: 120 ML | Refills: 11 | Status: SHIPPED | OUTPATIENT
Start: 2024-09-09

## 2024-09-09 NOTE — PROGRESS NOTES
SLEEP/PULMONARY  CLINIC NOTE      PATIENT IDENTIFICATION:  Name: Cara Castañeda  Age: 78 y.o.  Sex: female  :  1945  MRN: [id number]    DATE OF CONSULTATION:  2024     Referring physician:    Rocio Apodaca APRN            CHIEF COMPLAINT: SOB    History of Present Illness:   Cara Castañeda is a 78 y.o. female patient with asthma she still have dyspnea exertion but she is able to go shopping at [employer], intermittent dry coughing no chest pain rotation no dizziness no lightheadedness.  No change in her weight recently    Patient history of lung nodule last CAT scan was in December no follow-up yet      Review of Systems:   Constitutional: As above   Eyes: negative   ENT/oropharynx: negative   Cardiovascular: negative   Respiratory: negative   Gastrointestinal: negative   Genitourinary: negative   Neurological: negative   Musculoskeletal: negative   Integument/breast: negative   Endocrine: negative   Allergic/Immunologic: negative     Past Medical History:  Past Medical History:   Diagnosis Date    Allergic rhinitis     on immunotherapy- Dr. Benito     Asthma     Dr. Benito    Bruit of left carotid artery 2020    Carotid stenosis     <50%    Cold sore     Gallstone pancreatitis 2006    GERD (gastroesophageal reflux disease)     Dr. Oliva    Hiatal hernia     small, sliding     Hyperlipidemia     Hypertension     Left ventricular hypertrophy     Osteoarthritis     Dr. Avila    Osteopenia with high risk of fracture     Other long term (current) drug therapy 2019    Pneumonia March, April, May,2022    Pre-diabetes     Pseudogout     Vocal cord nodule     Dr. Nieves       Past Surgical History:  Past Surgical History:   Procedure Laterality Date    APPENDECTOMY      CHOLECYSTECTOMY  2006    Dr. Hernandez    ENDOSCOPY  2017    & Dilation. Dr. Oliva    ERCP  2006    Dr. Oliva    HYSTERECTOMY  1974    ovaries remain, for benign reasons    OTHER SURGICAL HISTORY  2009    Stress  Echo. No ischemia. EF 60-65%.     TONSILLECTOMY AND ADENOIDECTOMY      TOTAL KNEE ARTHROPLASTY  08/31/2017        Family History:  Family History   Problem Relation Age of Onset    Hypertension Mother     Hypertension Father     Heart disease Father     Osteoarthritis Father     Rheum arthritis Brother     Prostate cancer Paternal Grandfather     Breast cancer Neg Hx     Ovarian cancer Neg Hx         Social History:   Social History     Tobacco Use    Smoking status: Never     Passive exposure: Never    Smokeless tobacco: Never   Substance Use Topics    Alcohol use: No        Allergies:  Allergies   Allergen Reactions    Aspirin Other (See Comments)     bruising    Crestor [Rosuvastatin Calcium] Myalgia    Levaquin [Levofloxacin] Myalgia     tendonitis    Albuterol Sulfate Anxiety    Atorvastatin Calcium Nausea Only    Sulfa Antibiotics Nausea Only    Tolterodine Nausea Only     Detrol       Home Meds:  (Not in a hospital admission)      Objective:    Vitals Ranges:   Heart Rate:  [79] 79  Resp:  [14] 14  BP: (154)/(79) 154/79  Body mass index is 25.24 kg/m².     Exam:  General Appearance:  WDWN    HEENT:   without obvious abnormality,  Conjunctiva/corneas clear,  Normal external ear canals, no drainage    Clear orsalmucosa,  Mallampati score 3    Neck:  Supple, symmetrical, trachea midline. No JVD.  Lungs:   Bilateral basal rhonchi bilaterally, respirations unlabored symmetrical wall movement.    Chest wall:  No tenderness or deformity.    Heart:  Regular rate and rhythm, S1 and S2 normal.  Extremities: Trace edema no clubbing or Cyanosis        Data Review:  All labs (24hrs): No results found for this or any previous visit (from the past 24 hour(s)).     Imaging:  Mammo Screening Digital Tomosynthesis Bilateral With CAD  Narrative: MAMMO SCREENING DIGITAL TOMOSYNTHESIS BILATERAL W CAD-     Date of Exam: 5/16/2024 11:09 AM     Indication: Screening. Z12.31-Encounter for screening mammogram for  malignant neoplasm  of breast.     Comparison: Prior mammograms 4/14/2023, 3/18/2022, 3/15/2021, 3/5/2020,  2/14/2019, 2/12/2018, 2/10/2017, 2/8/2016, 2/10/2015     Technique: MLO and CC views of bilateral breast(s) were obtained  according to routine screening breast mammography protocol with  tomosynthesis.       The mammographic images were interpreted with the assistance of a  computer aided detection system.     FINDINGS:  There are scattered areas of fibroglandular density. There are no  suspicious masses, suspicious microcalcifications, or architectural  distortion in either breast. Breast arterial calcifications are present.  A strong association has been shown between arterial calcifications and  cardiovascular disease (CVD) independent of other known risk factors.     Impression: No mammographic signs of malignancy. Recommend routine mammographic  screening.     BI-RADS ASSESSMENT: BI-RADS 2. Benign     The patient's information is entered into a computerized reminder system  with a targeted due date for the next mammogram.     Note:  It has been reported that there is approximately a 15% false  negative in mammography.  Therefore, management of a palpable  abnormality should not be deferred because of a negative mammogram.              Electronically Signed By-Herve Wang MD On:5/16/2024 12:32 PM          ASSESSMENT:  Diagnoses and all orders for this visit:    Mild persistent asthma without complication  -     6 Minute Walk Test; Future  -     Miscellaneous DME    Bronchiectasis without complication  -     Complete PFT - Pre & Post Bronchodilator; Future    Primary insomnia    Allergic rhinitis, unspecified seasonality, unspecified trigger    Solitary lung nodule  -     CT Chest Without Contrast Diagnostic; Future    Other orders  -     budesonide (Pulmicort) 0.5 MG/2ML nebulizer solution; Take 2 mL by nebulization 2 (Two) Times a Day. DX J44.9  -     arformoterol (Brovana) 15 MCG/2ML nebulizer solution; Take 2 mL by  nebulization 2 (Two) Times a Day. DX COPD J44.9        PLAN:  Will arrange CAT scan    Bronchodilator inhaled corticosteroid    Education how to use inhalers    Encouraged to use incentive spirometer    Continue to exercise slowly as tolerated    Monitor for any change in the color of the sputum    Avoid any exposure to fumes, gas or any irritant    Set a bedtime that is early enough for you to get at least 7 hours of sleep.  Don’t go to bed unless you are sleepy.  If you don’t fall asleep after 20 minutes, get out of bed.  Establish a relaxing bedtime routine.  Use your bed only for sleep and sex.  Make your bedroom quiet and relaxing. Keep the room at a comfortable, cool temperature.  Limit exposure to bright light in the evenings.  Turn off electronic devices at least 30 minutes before bedtime.  Don’t eat a large meal before bedtime. If you are hungry at night, eat a light, healthy snack.  Exercise regularly and maintain a healthy diet.  Avoid consuming caffeine in the late afternoon or evening.  Avoid consuming alcohol before bedtime.  Reduce your fluid intake before bedtime.  Avoid naps during the day time.     Follow-up 3 months    Natty Hodge MD. D, ABSM.  9/9/2024  13:42 EDT

## 2024-10-11 RX ORDER — MONTELUKAST SODIUM 10 MG/1
10 TABLET ORAL DAILY
Qty: 90 TABLET | Refills: 1 | Status: SHIPPED | OUTPATIENT
Start: 2024-10-11

## 2024-10-28 RX ORDER — DILTIAZEM HYDROCHLORIDE 120 MG/1
CAPSULE, EXTENDED RELEASE ORAL
Qty: 90 CAPSULE | Refills: 1 | Status: SHIPPED | OUTPATIENT
Start: 2024-10-28

## 2024-10-30 ENCOUNTER — OFFICE VISIT (OUTPATIENT)
Dept: FAMILY MEDICINE CLINIC | Facility: CLINIC | Age: 79
End: 2024-10-30
Payer: MEDICARE

## 2024-10-30 VITALS
SYSTOLIC BLOOD PRESSURE: 128 MMHG | BODY MASS INDEX: 25.21 KG/M2 | OXYGEN SATURATION: 97 % | HEIGHT: 62 IN | TEMPERATURE: 97.5 F | HEART RATE: 65 BPM | RESPIRATION RATE: 16 BRPM | WEIGHT: 137 LBS | DIASTOLIC BLOOD PRESSURE: 84 MMHG

## 2024-10-30 DIAGNOSIS — J45.30 MILD PERSISTENT ASTHMA WITHOUT COMPLICATION: ICD-10-CM

## 2024-10-30 DIAGNOSIS — Z23 NEED FOR IMMUNIZATION AGAINST INFLUENZA: ICD-10-CM

## 2024-10-30 DIAGNOSIS — J47.9 BRONCHIECTASIS WITHOUT COMPLICATION: ICD-10-CM

## 2024-10-30 DIAGNOSIS — I10 BENIGN HYPERTENSION: ICD-10-CM

## 2024-10-30 DIAGNOSIS — E78.2 MIXED HYPERLIPIDEMIA: ICD-10-CM

## 2024-10-30 DIAGNOSIS — R73.03 PREDIABETES: ICD-10-CM

## 2024-10-30 PROCEDURE — 99214 OFFICE O/P EST MOD 30 MIN: CPT | Performed by: NURSE PRACTITIONER

## 2024-10-30 PROCEDURE — G0008 ADMIN INFLUENZA VIRUS VAC: HCPCS | Performed by: NURSE PRACTITIONER

## 2024-10-30 PROCEDURE — 1160F RVW MEDS BY RX/DR IN RCRD: CPT | Performed by: NURSE PRACTITIONER

## 2024-10-30 PROCEDURE — 3078F DIAST BP <80 MM HG: CPT | Performed by: NURSE PRACTITIONER

## 2024-10-30 PROCEDURE — 1159F MED LIST DOCD IN RCRD: CPT | Performed by: NURSE PRACTITIONER

## 2024-10-30 PROCEDURE — 3074F SYST BP LT 130 MM HG: CPT | Performed by: NURSE PRACTITIONER

## 2024-10-30 PROCEDURE — 1126F AMNT PAIN NOTED NONE PRSNT: CPT | Performed by: NURSE PRACTITIONER

## 2024-10-30 PROCEDURE — 90662 IIV NO PRSV INCREASED AG IM: CPT | Performed by: NURSE PRACTITIONER

## 2024-10-30 NOTE — PROGRESS NOTES
Chief Complaint  Hypertension and Hyperlipidemia    Subjective          Cara Castañeda presents to [facility] for retention hyperlipidemia osteoporosis    History of Present Illness      Patient is here to follow-up on hypertension hyperlipidemia.  She continues to see pulmonary on a regular basis.  She is feeling well at her baseline.  She volunteers 3 days a week and still continues to be very active in her [employer].  She has no new complaints.    We have discussed osteoporosis and will discontinue Prolia.  Patient has decreased her calcium amount as well.        Cara Castañeda  has a past medical history of Allergic rhinitis, Asthma, Bruit of left carotid artery (02/20/2020), Carotid stenosis, Cold sore, Gallstone pancreatitis (2006), GERD (gastroesophageal reflux disease), Hiatal hernia, Hyperlipidemia, Hypertension, Left ventricular hypertrophy, Osteoarthritis, Osteopenia with high risk of fracture, Other long term (current) drug therapy (01/29/2019), Pneumonia (March, April, May,2022), Pre-diabetes, Pseudogout, and Vocal cord nodule.      Review of Systems   Constitutional:  Negative for fever.   Respiratory:  Negative for cough and shortness of breath.    Cardiovascular:  Negative for chest pain and leg swelling.   Gastrointestinal:  Negative for abdominal pain.   Musculoskeletal:  Positive for arthralgias. Negative for joint swelling.   Skin:  Negative for rash.        Objective       Current Outpatient Medications:     arformoterol (Brovana) 15 MCG/2ML nebulizer solution, Take 2 mL by nebulization 2 (Two) Times a Day. DX COPD J44.9, Disp: 120 mL, Rfl: 11    budesonide (Pulmicort) 0.5 MG/2ML nebulizer solution, Take 2 mL by nebulization 2 (Two) Times a Day. DX J44.9, Disp: 60 each, Rfl: 11    budesonide-formoterol (SYMBICORT) 160-4.5 MCG/ACT inhaler, Inhale 2 puffs 2 (Two) Times a Day., Disp: 1 each, Rfl: 11    cetirizine (zyrTEC) 10 MG tablet, Take 1 tablet by mouth  "Daily., Disp: , Rfl:     guaiFENesin (MUCINEX) 600 MG 12 hr tablet, Take 1 tablet by mouth 2 (Two) Times a Day., Disp: , Rfl:     hydroxychloroquine (PLAQUENIL) 200 MG tablet, Take  by mouth 2 (Two) Times a Day., Disp: , Rfl:     losartan (COZAAR) 100 MG tablet, TAKE 1 TABLET BY MOUTH EVERY DAY, Disp: 90 tablet, Rfl: 1    montelukast (SINGULAIR) 10 MG tablet, TAKE 1 TABLET BY MOUTH EVERY DAY, Disp: 90 tablet, Rfl: 1    omeprazole (priLOSEC) 20 MG capsule, Take 1 capsule by mouth Daily., Disp: , Rfl:     predniSONE (DELTASONE) 5 MG tablet, Take 1 tablet by mouth Daily., Disp: , Rfl:     Tiadylt  MG 24 hr capsule, TAKE 1 CAPSULE BY MOUTH DAILY. STOP AMLODIPINE, Disp: 90 capsule, Rfl: 1    timolol (TIMOPTIC) 0.5 % ophthalmic solution, APPLY 1 DROP BY OPHTHALMIC ROUTE TO BOTH EYES IN THE MORNING, Disp: , Rfl:     Vital Signs:      /84   Pulse 65   Temp 97.5 °F (36.4 °C) (Infrared)   Resp 16   Ht 157.5 cm (62\")   Wt 62.1 kg (137 lb)   SpO2 97%   BMI 25.06 kg/m²     Vitals:    10/30/24 1024 10/30/24 1049   BP: 163/78 128/84   BP Location: Left arm    Patient Position: Sitting    Cuff Size: Small Adult    Pulse: 65    Resp: 16    Temp: 97.5 °F (36.4 °C)    TempSrc: Infrared    SpO2: 97%    Weight: 62.1 kg (137 lb)    Height: 157.5 cm (62\")       Physical Exam  Vitals and nursing note reviewed.   Constitutional:       Appearance: She is well-developed.   Cardiovascular:      Rate and Rhythm: Normal rate and regular rhythm.      Heart sounds: Normal heart sounds. No murmur heard.     No friction rub. No gallop.   Pulmonary:      Effort: Pulmonary effort is normal.      Breath sounds: Normal breath sounds. No wheezing or rales.   Musculoskeletal:         General: Deformity present.      Comments: Arthritic hands   Skin:     General: Skin is warm and dry.   Neurological:      General: No focal deficit present.      Mental Status: She is alert.      Cranial Nerves: Cranial nerves 2-12 are intact. No cranial " nerve deficit.      Sensory: No sensory deficit.      Motor: No weakness.      Coordination: Coordination is intact.      Gait: Gait normal.      Deep Tendon Reflexes:      Reflex Scores:       Patellar reflexes are 1+ on the right side and 1+ on the left side.       Achilles reflexes are 1+ on the right side and 1+ on the left side.  Psychiatric:         Mood and Affect: Mood normal.         Behavior: Behavior normal.         Thought Content: Thought content normal.        Result Review :                  PHQ-9 Total Score:             Assessment and Plan    Diagnoses and all orders for this visit:    1. BMI 25.0-25.9,adult (Primary)    2. Need for immunization against influenza  -     Fluzone High-Dose 65+yrs    3. Benign hypertension  Assessment & Plan:  Hypertension is stable and controlled  Continue current treatment regimen.  Blood pressure will be reassessed in 6 months.    Orders:  -     Comprehensive Metabolic Panel  -     CBC Auto Differential    4. Mixed hyperlipidemia  -     Lipid Panel    5. Prediabetes  Assessment & Plan:  Rechecking labs today    Orders:  -     Hemoglobin A1c  -     Comprehensive Metabolic Panel    6. Mild persistent asthma without complication  Assessment & Plan:  Continuing inhalers and follow-up with pulmonology            7. Bronchiectasis without complication         Problem List Items Addressed This Visit          Active Problems    Asthma    Current Assessment & Plan     Continuing inhalers and follow-up with pulmonology               Benign hypertension    Current Assessment & Plan     Hypertension is stable and controlled  Continue current treatment regimen.  Blood pressure will be reassessed in 6 months.         Relevant Orders    Comprehensive Metabolic Panel    CBC Auto Differential    Hyperlipidemia    Relevant Orders    Lipid Panel    Prediabetes    Current Assessment & Plan     Rechecking labs today         Relevant Orders    Hemoglobin A1c    Comprehensive Metabolic  Panel    Bronchiectasis without complication     Other Visit Diagnoses       BMI 25.0-25.9,adult    -  Primary    Need for immunization against influenza        Relevant Orders    Fluzone High-Dose 65+yrs (Completed)            Follow Up   Return in about 6 months (around 4/30/2025) for Medicare Wellness.  Patient was given instructions and counseling regarding her condition or for health maintenance advice. Please see specific information pulled into the AVS if appropriate.

## 2024-11-01 RX ORDER — LOSARTAN POTASSIUM 100 MG/1
100 TABLET ORAL DAILY
Qty: 90 TABLET | Refills: 1 | Status: SHIPPED | OUTPATIENT
Start: 2024-11-01

## 2024-11-02 LAB
ALBUMIN SERPL-MCNC: 4.1 G/DL (ref 3.8–4.8)
ALP SERPL-CCNC: 83 IU/L (ref 44–121)
ALT SERPL-CCNC: 14 IU/L (ref 0–32)
AST SERPL-CCNC: 19 IU/L (ref 0–40)
BASOPHILS # BLD AUTO: 0.1 X10E3/UL (ref 0–0.2)
BASOPHILS NFR BLD AUTO: 2 %
BILIRUB SERPL-MCNC: 0.3 MG/DL (ref 0–1.2)
BUN SERPL-MCNC: 11 MG/DL (ref 8–27)
BUN/CREAT SERPL: 12 (ref 12–28)
CALCIUM SERPL-MCNC: 10.3 MG/DL (ref 8.7–10.3)
CHLORIDE SERPL-SCNC: 101 MMOL/L (ref 96–106)
CHOLEST SERPL-MCNC: 200 MG/DL (ref 100–199)
CO2 SERPL-SCNC: 24 MMOL/L (ref 20–29)
CREAT SERPL-MCNC: 0.9 MG/DL (ref 0.57–1)
EGFRCR SERPLBLD CKD-EPI 2021: 65 ML/MIN/1.73
EOSINOPHIL # BLD AUTO: 0.3 X10E3/UL (ref 0–0.4)
EOSINOPHIL NFR BLD AUTO: 6 %
ERYTHROCYTE [DISTWIDTH] IN BLOOD BY AUTOMATED COUNT: 14.4 % (ref 11.7–15.4)
GLOBULIN SER CALC-MCNC: 2.1 G/DL (ref 1.5–4.5)
GLUCOSE SERPL-MCNC: 86 MG/DL (ref 70–99)
HBA1C MFR BLD: 6 % (ref 4.8–5.6)
HCT VFR BLD AUTO: 43 % (ref 34–46.6)
HDLC SERPL-MCNC: 58 MG/DL
HGB BLD-MCNC: 13.7 G/DL (ref 11.1–15.9)
IMM GRANULOCYTES # BLD AUTO: 0 X10E3/UL (ref 0–0.1)
IMM GRANULOCYTES NFR BLD AUTO: 1 %
LDLC SERPL CALC-MCNC: 108 MG/DL (ref 0–99)
LYMPHOCYTES # BLD AUTO: 0.8 X10E3/UL (ref 0.7–3.1)
LYMPHOCYTES NFR BLD AUTO: 14 %
MCH RBC QN AUTO: 27.9 PG (ref 26.6–33)
MCHC RBC AUTO-ENTMCNC: 31.9 G/DL (ref 31.5–35.7)
MCV RBC AUTO: 88 FL (ref 79–97)
MONOCYTES # BLD AUTO: 0.6 X10E3/UL (ref 0.1–0.9)
MONOCYTES NFR BLD AUTO: 10 %
NEUTROPHILS # BLD AUTO: 3.9 X10E3/UL (ref 1.4–7)
NEUTROPHILS NFR BLD AUTO: 67 %
PLATELET # BLD AUTO: 307 X10E3/UL (ref 150–450)
POTASSIUM SERPL-SCNC: 4.1 MMOL/L (ref 3.5–5.2)
PROT SERPL-MCNC: 6.2 G/DL (ref 6–8.5)
RBC # BLD AUTO: 4.91 X10E6/UL (ref 3.77–5.28)
SODIUM SERPL-SCNC: 138 MMOL/L (ref 134–144)
TRIGL SERPL-MCNC: 198 MG/DL (ref 0–149)
VLDLC SERPL CALC-MCNC: 34 MG/DL (ref 5–40)
WBC # BLD AUTO: 5.8 X10E3/UL (ref 3.4–10.8)

## 2024-11-13 ENCOUNTER — TELEPHONE (OUTPATIENT)
Dept: FAMILY MEDICINE CLINIC | Facility: CLINIC | Age: 79
End: 2024-11-13
Payer: MEDICARE

## 2024-11-13 NOTE — TELEPHONE ENCOUNTER
I called patient to discuss ordering Prolia for her after I received the verification back and she does not want to proceed with it. She said that she discussed this with you and she felt there was no need for it.

## 2024-12-05 ENCOUNTER — HOSPITAL ENCOUNTER (OUTPATIENT)
Dept: CT IMAGING | Facility: HOSPITAL | Age: 79
Discharge: HOME | End: 2024-12-05
Admitting: INTERNAL MEDICINE
Payer: MEDICARE

## 2024-12-05 DIAGNOSIS — R91.1 SOLITARY LUNG NODULE: ICD-10-CM

## 2024-12-05 PROCEDURE — 71250 CT THORAX DX C-: CPT

## 2024-12-16 ENCOUNTER — HOSPITAL ENCOUNTER (OUTPATIENT)
Dept: RESPIRATORY THERAPY | Facility: HOSPITAL | Age: 79
Discharge: HOME | End: 2024-12-16
Payer: MEDICARE

## 2024-12-16 VITALS — HEART RATE: 73 BPM | OXYGEN SATURATION: 97 % | RESPIRATION RATE: 12 BRPM

## 2024-12-16 DIAGNOSIS — J47.9 BRONCHIECTASIS WITHOUT COMPLICATION: ICD-10-CM

## 2024-12-16 DIAGNOSIS — J45.30 MILD PERSISTENT ASTHMA WITHOUT COMPLICATION: ICD-10-CM

## 2024-12-16 PROCEDURE — 94799 UNLISTED PULMONARY SVC/PX: CPT

## 2024-12-16 PROCEDURE — 94618 PULMONARY STRESS TESTING: CPT

## 2024-12-16 PROCEDURE — 94664 DEMO&/EVAL PT USE INHALER: CPT

## 2024-12-16 PROCEDURE — 94060 EVALUATION OF WHEEZING: CPT

## 2024-12-16 PROCEDURE — 94729 DIFFUSING CAPACITY: CPT

## 2024-12-16 PROCEDURE — 94726 PLETHYSMOGRAPHY LUNG VOLUMES: CPT

## 2024-12-16 RX ADMIN — IPRATROPIUM BROMIDE 0.5 MG: 0.5 SOLUTION RESPIRATORY (INHALATION) at 13:19

## 2024-12-16 NOTE — PROCEDURES
Exercise Oximetry    Patient Name:Cara Castañeda   MRN: [id number]   Date: 12/16/24             ROOM AIR BASELINE   SpO2%   97   Heart Rate   74   Blood Pressure      EXERCISE ON ROOM AIR SpO2% EXERCISE ON O2 @  LPM SpO2%   1 MINUTE   98 1 MINUTE    2 MINUTES   98 2 MINUTES    3 MINUTES   98 3 MINUTES    4 MINUTES   98 4 MINUTES    5 MINUTES   98 5 MINUTES    6 MINUTES   98 6 MINUTES               Distance Walked  approx 1000 feet Distance Walked   Dyspnea (Lizet Scale)    5 Dyspnea (Lizet Scale)   Fatigue (Lizet Scale)    3 Fatigue (Lizet Scale)   SpO2% Post Exercise    99 SpO2% Post Exercise   HR Post Exercise    80 HR Post Exercise   Time to Recovery  2 minutes Time to Recovery     Comments: Pt ambulated on room air at slow, steady pace without resting.

## 2025-01-13 ENCOUNTER — OFFICE VISIT (OUTPATIENT)
Dept: PULMONOLOGY | Facility: HOSPITAL | Age: 80
End: 2025-01-13
Payer: MEDICARE

## 2025-01-13 VITALS
HEART RATE: 75 BPM | WEIGHT: 138 LBS | OXYGEN SATURATION: 98 % | SYSTOLIC BLOOD PRESSURE: 136 MMHG | HEIGHT: 62 IN | BODY MASS INDEX: 25.4 KG/M2 | DIASTOLIC BLOOD PRESSURE: 78 MMHG

## 2025-01-13 DIAGNOSIS — J47.9 BRONCHIECTASIS WITHOUT COMPLICATION: ICD-10-CM

## 2025-01-13 DIAGNOSIS — J45.30 MILD PERSISTENT ASTHMA WITHOUT COMPLICATION: Primary | ICD-10-CM

## 2025-01-13 DIAGNOSIS — J30.9 ALLERGIC RHINITIS, UNSPECIFIED SEASONALITY, UNSPECIFIED TRIGGER: ICD-10-CM

## 2025-01-13 PROCEDURE — G0463 HOSPITAL OUTPT CLINIC VISIT: HCPCS

## 2025-01-13 NOTE — PROGRESS NOTES
SLEEP/PULMONARY  CLINIC NOTE      PATIENT IDENTIFICATION:  Name: Cara Castañeda  Age: 79 y.o.  Sex: female  :  1945  MRN: [id number]    DATE OF CONSULTATION:  2025     Referring physician:    Rocio Apodaca APRN            CHIEF COMPLAINT: Follow-up on shortness of breath    History of Present Illness:   Cara Castañeda is a 79 y.o. female patient with chronic asthma currently on inhaled steroid bronchodilator feeling significantly better no shortness of breath no coughing no wheezing no recurrent respiratory infection, also allergic rhinitis symptoms is controlled      Review of Systems:   Constitutional: As above   Eyes: negative   ENT/oropharynx: negative   Cardiovascular: negative   Respiratory: negative   Gastrointestinal: negative   Genitourinary: negative   Neurological: negative   Musculoskeletal: negative   Integument/breast: negative   Endocrine: negative   Allergic/Immunologic: negative     Past Medical History:  Past Medical History:   Diagnosis Date    Allergic rhinitis     on immunotherapy- Dr. Benito     Asthma     Dr. Benito    Bruit of left carotid artery 2020    Carotid stenosis     <50%    Cold sore     Gallstone pancreatitis     GERD (gastroesophageal reflux disease)     Dr. Oliva    Hiatal hernia     small, sliding     Hyperlipidemia     Hypertension     Left ventricular hypertrophy     Osteoarthritis     Dr. Avila    Osteopenia with high risk of fracture     Other long term (current) drug therapy 2019    Pneumonia March, April, May,2022    Pre-diabetes     Pseudogout     Vocal cord nodule     Dr. Nieves       Past Surgical History:  Past Surgical History:   Procedure Laterality Date    APPENDECTOMY      CHOLECYSTECTOMY  2006    Dr. Hernandez    ENDOSCOPY  2017    & Dilation. Dr. Oliva    ERCP  2006    Dr. Oliva    HYSTERECTOMY  1974    ovaries remain, for benign reasons    OTHER SURGICAL HISTORY      Stress Echo. No ischemia. EF 60-65%.      TONSILLECTOMY AND ADENOIDECTOMY      TOTAL KNEE ARTHROPLASTY  2017        Family History:  Family History   Problem Relation Age of Onset    Hypertension Mother     Hypertension Father     Heart disease Father     Osteoarthritis Father     Rheum arthritis Brother     Prostate cancer Paternal Grandfather     Asthma Granddaughter     Breast cancer Neg Hx     Ovarian cancer Neg Hx         Social History:   Social History     Tobacco Use    Smoking status: Never     Passive exposure: Never    Smokeless tobacco: Never   Substance Use Topics    Alcohol use: No        Allergies:  Allergies   Allergen Reactions    Aspirin Other (See Comments)     bruising    Crestor [Rosuvastatin Calcium] Myalgia    Levaquin [Levofloxacin] Myalgia     tendonitis    Albuterol Sulfate Anxiety    Atorvastatin Calcium Nausea Only    Sulfa Antibiotics Nausea Only    Tolterodine Nausea Only     Detrol       Home Meds:  (Not in a hospital admission)      Objective:    Vitals Ranges:   Heart Rate:  [75] 75  BP: (136)/(78) 136/78  Body mass index is 25.24 kg/m².     Exam:  General Appearance:  WDWN    HEENT:   without obvious abnormality,  Conjunctiva/corneas clear,  Normal external ear canals, no drainage    Clear orsalmucosa,  Mallampati score 3    Neck:  Supple, symmetrical, trachea midline. No JVD.  Lungs:   Bilateral basal rhonchi bilaterally, respirations unlabored symmetrical wall movement.    Chest wall:  No tenderness or deformity.    Heart:  Regular rate and rhythm, S1 and S2 normal.  Extremities: Trace edema no clubbing or Cyanosis        Data Review:  All labs (24hrs): No results found for this or any previous visit (from the past 24 hours).     Imaging:  Complete PFT - Pre & Post Bronchodilator  Table formatting from the original result was not included.  Interpretation pulmonary function test    PATIENT IDENTIFICATION:  Name: Cara Castañeda  Age: 79 y.o.  Sex: female  :  1945  MRN: [id number]  Date Of Test:  12/16/2024  Indication: Moderate persistent    Weight: 130      height: 60         BMI Readings from Last 1 Encounters:   10/30/24 25.06 kg/m²         1. The spirometry showing the patient has   normal FEV1 FVC ratio a normal   value, no segment changes bronchodilator.    2. Lung volumes within normal limits.    3. Diffusion capacity  decreased to 85 of predicted corrected to normal   For ventilated alveoli.     4. Volume loops normal.     Impression: This pulmonary function test showing that  the patient has has   normal value does not rule possible underlying asthma.    Natty Hodge MD. D, ABSM.  12/17/2024            ASSESSMENT:  Diagnoses and all orders for this visit:    Mild persistent asthma without complication    Bronchiectasis without complication    Allergic rhinitis, unspecified seasonality, unspecified trigger        PLAN:  Bronchodilator inhaled corticosteroid    Education how to use inhalers    Encouraged to use incentive spirometer    Continue to exercise slowly as tolerated    Monitor for any change in the color of the sputum    Avoid any exposure to fumes, gas or any irritant        Follow-up 6 months    Natty Hodge MD. D, ABSM.  1/13/2025  13:11 EST

## 2025-02-26 RX ORDER — MONTELUKAST SODIUM 10 MG/1
10 TABLET ORAL DAILY
Qty: 90 TABLET | Refills: 1 | Status: SHIPPED | OUTPATIENT
Start: 2025-02-26

## 2025-02-26 RX ORDER — LOSARTAN POTASSIUM 100 MG/1
100 TABLET ORAL DAILY
Qty: 90 TABLET | Refills: 1 | Status: SHIPPED | OUTPATIENT
Start: 2025-02-26

## 2025-02-26 RX ORDER — DILTIAZEM HYDROCHLORIDE 120 MG/1
CAPSULE, EXTENDED RELEASE ORAL
Qty: 90 CAPSULE | Refills: 1 | Status: SHIPPED | OUTPATIENT
Start: 2025-02-26

## 2025-04-11 RX ORDER — BUDESONIDE 0.5 MG/2ML
0.5 INHALANT ORAL
Qty: 360 EACH | Refills: 1 | Status: SHIPPED | OUTPATIENT
Start: 2025-04-11

## 2025-05-14 ENCOUNTER — TELEPHONE (OUTPATIENT)
Dept: FAMILY MEDICINE CLINIC | Facility: CLINIC | Age: 80
End: 2025-05-14
Payer: MEDICARE

## 2025-05-14 DIAGNOSIS — R73.03 PREDIABETES: ICD-10-CM

## 2025-05-14 DIAGNOSIS — I10 BENIGN HYPERTENSION: ICD-10-CM

## 2025-05-14 DIAGNOSIS — E78.2 MIXED HYPERLIPIDEMIA: Primary | ICD-10-CM

## 2025-05-14 DIAGNOSIS — E53.8 VITAMIN B12 DEFICIENCY: ICD-10-CM

## 2025-05-14 NOTE — TELEPHONE ENCOUNTER
Patient advised to get fasting lab work done at [facility] at least 3 days prior to being seen in office if able.

## 2025-05-20 LAB
ALBUMIN SERPL-MCNC: 4.2 G/DL (ref 3.7–4.7)
ALP SERPL-CCNC: 92 IU/L (ref 44–121)
ALT SERPL-CCNC: 11 IU/L (ref 0–32)
AST SERPL-CCNC: 17 IU/L (ref 0–40)
BILIRUB SERPL-MCNC: 0.3 MG/DL (ref 0–1.2)
BUN SERPL-MCNC: 10 MG/DL (ref 8–27)
BUN/CREAT SERPL: 11 (ref 12–28)
CALCIUM SERPL-MCNC: 10.5 MG/DL (ref 8.7–10.3)
CHLORIDE SERPL-SCNC: 99 MMOL/L (ref 96–106)
CHOLEST SERPL-MCNC: 211 MG/DL (ref 100–199)
CHOLEST/HDLC SERPL: 3.3 RATIO (ref 0–4.4)
CO2 SERPL-SCNC: 23 MMOL/L (ref 20–29)
CREAT SERPL-MCNC: 0.92 MG/DL (ref 0.57–1)
EGFRCR SERPLBLD CKD-EPI 2021: 63 ML/MIN/1.73
GLOBULIN SER CALC-MCNC: 2.3 G/DL (ref 1.5–4.5)
GLUCOSE SERPL-MCNC: 91 MG/DL (ref 70–99)
HBA1C MFR BLD: 5.7 % (ref 4.8–5.6)
HDLC SERPL-MCNC: 63 MG/DL
LDLC SERPL CALC-MCNC: 116 MG/DL (ref 0–99)
POTASSIUM SERPL-SCNC: 4.6 MMOL/L (ref 3.5–5.2)
PROT SERPL-MCNC: 6.5 G/DL (ref 6–8.5)
SODIUM SERPL-SCNC: 137 MMOL/L (ref 134–144)
TRIGL SERPL-MCNC: 187 MG/DL (ref 0–149)
TSH SERPL DL<=0.005 MIU/L-ACNC: 3.59 UIU/ML (ref 0.45–4.5)
VIT B12 SERPL-MCNC: 889 PG/ML (ref 232–1245)
VLDLC SERPL CALC-MCNC: 32 MG/DL (ref 5–40)

## 2025-05-21 ENCOUNTER — HOSPITAL ENCOUNTER (OUTPATIENT)
Dept: GENERAL RADIOLOGY | Facility: HOSPITAL | Age: 80
Discharge: HOME | End: 2025-05-21
Admitting: NURSE PRACTITIONER
Payer: MEDICARE

## 2025-05-21 ENCOUNTER — OFFICE VISIT (OUTPATIENT)
Dept: FAMILY MEDICINE CLINIC | Facility: CLINIC | Age: 80
End: 2025-05-21
Payer: MEDICARE

## 2025-05-21 ENCOUNTER — PATIENT ROUNDING (BHMG ONLY) (OUTPATIENT)
Dept: FAMILY MEDICINE CLINIC | Facility: CLINIC | Age: 80
End: 2025-05-21
Payer: MEDICARE

## 2025-05-21 VITALS
RESPIRATION RATE: 16 BRPM | SYSTOLIC BLOOD PRESSURE: 128 MMHG | HEIGHT: 62 IN | DIASTOLIC BLOOD PRESSURE: 80 MMHG | BODY MASS INDEX: 25.21 KG/M2 | TEMPERATURE: 97.4 F | OXYGEN SATURATION: 96 % | HEART RATE: 68 BPM | WEIGHT: 137 LBS

## 2025-05-21 DIAGNOSIS — E83.52 HYPERCALCEMIA: ICD-10-CM

## 2025-05-21 DIAGNOSIS — Z78.0 MENOPAUSE PRESENT: ICD-10-CM

## 2025-05-21 DIAGNOSIS — I34.81 MITRAL ANNULAR CALCIFICATION: ICD-10-CM

## 2025-05-21 DIAGNOSIS — Z00.00 MEDICARE ANNUAL WELLNESS VISIT, SUBSEQUENT: Primary | ICD-10-CM

## 2025-05-21 DIAGNOSIS — J30.9 ALLERGIC RHINITIS, UNSPECIFIED SEASONALITY, UNSPECIFIED TRIGGER: ICD-10-CM

## 2025-05-21 DIAGNOSIS — G45.9 TIA (TRANSIENT ISCHEMIC ATTACK): ICD-10-CM

## 2025-05-21 DIAGNOSIS — E78.2 MIXED HYPERLIPIDEMIA: ICD-10-CM

## 2025-05-21 DIAGNOSIS — Z12.31 SCREENING MAMMOGRAM FOR BREAST CANCER: ICD-10-CM

## 2025-05-21 DIAGNOSIS — R73.03 PREDIABETES: ICD-10-CM

## 2025-05-21 DIAGNOSIS — M25.50 POLYARTHRALGIA: ICD-10-CM

## 2025-05-21 DIAGNOSIS — J47.9 BRONCHIECTASIS WITHOUT COMPLICATION: ICD-10-CM

## 2025-05-21 DIAGNOSIS — Z79.899 OTHER LONG TERM (CURRENT) DRUG THERAPY: ICD-10-CM

## 2025-05-21 DIAGNOSIS — R05.2 SUBACUTE COUGH: ICD-10-CM

## 2025-05-21 DIAGNOSIS — J34.89 LESION OF NASAL SEPTUM: ICD-10-CM

## 2025-05-21 DIAGNOSIS — I25.10 CORONARY ARTERY CALCIFICATION SEEN ON CT SCAN: ICD-10-CM

## 2025-05-21 DIAGNOSIS — J34.89 NASAL SEPTUM PERFORATION: ICD-10-CM

## 2025-05-21 DIAGNOSIS — R53.1 RIGHT SIDED WEAKNESS: ICD-10-CM

## 2025-05-21 DIAGNOSIS — I10 BENIGN HYPERTENSION: ICD-10-CM

## 2025-05-21 PROCEDURE — 71046 X-RAY EXAM CHEST 2 VIEWS: CPT

## 2025-05-21 RX ORDER — BRIMONIDINE TARTRATE 2 MG/ML
SOLUTION/ DROPS OPHTHALMIC
COMMUNITY
Start: 2025-05-20

## 2025-05-21 RX ORDER — MONTELUKAST SODIUM 10 MG/1
10 TABLET ORAL DAILY
Qty: 90 TABLET | Refills: 1 | Status: SHIPPED | OUTPATIENT
Start: 2025-05-21

## 2025-05-21 RX ORDER — OLOPATADINE HYDROCHLORIDE 2 MG/ML
SOLUTION/ DROPS OPHTHALMIC
COMMUNITY

## 2025-05-21 RX ORDER — BIMATOPROST 0.1 MG/ML
SOLUTION/ DROPS OPHTHALMIC
COMMUNITY
Start: 2025-01-09

## 2025-05-21 RX ORDER — LOSARTAN POTASSIUM 100 MG/1
100 TABLET ORAL DAILY
Qty: 90 TABLET | Refills: 1 | Status: SHIPPED | OUTPATIENT
Start: 2025-05-21

## 2025-05-21 RX ORDER — DILTIAZEM HYDROCHLORIDE 120 MG/1
120 CAPSULE, EXTENDED RELEASE ORAL DAILY
Qty: 90 CAPSULE | Refills: 1 | Status: SHIPPED | OUTPATIENT
Start: 2025-05-21

## 2025-05-21 NOTE — PROGRESS NOTES
Subjective   The ABCs of the Annual Wellness Visit  Medicare Wellness Visit      Cara Castañeda is a 79 y.o. patient who presents for a Medicare Wellness Visit.    The following portions of the patient's history were reviewed and   updated as appropriate: allergies, current medications, past family history, past medical history, past social history, past surgical history, and problem list.    Compared to one year ago, the patient's physical   health is worse.  Compared to one year ago, the patient's mental   health is the same.    Recent Hospitalizations:  She was not admitted to the hospital during the last year.     Current Medical Providers:  Patient Care Team:  Rocio Apodaca APRN as PCP - General (Nurse Practitioner)  Fernando Oliva MD as Consulting Physician (Gastroenterology)  Natty Hodge MD as Consulting Physician (Pulmonary Disease)  Mark Byers MD as Consulting Physician (Cardiology)  Marcin Benito MD as Consulting Physician (Allergy)    Outpatient Medications Prior to Visit   Medication Sig Dispense Refill    arformoterol (Brovana) 15 MCG/2ML nebulizer solution Take 2 mL by nebulization 2 (Two) Times a Day. DX COPD J44.9 120 mL 11    bimatoprost (Lumigan) 0.01 % ophthalmic drops instill 1 drop by each eye route every evening      brimonidine (ALPHAGAN) 0.2 % ophthalmic solution       budesonide (Pulmicort) 0.5 MG/2ML nebulizer solution Take 2 mL by nebulization 2 (Two) Times a Day. DX J44.9 360 each 1    budesonide-formoterol (SYMBICORT) 160-4.5 MCG/ACT inhaler Inhale 2 puffs 2 (Two) Times a Day. 1 each 11    cetirizine (zyrTEC) 10 MG tablet Take 1 tablet by mouth Daily.      guaiFENesin (MUCINEX) 600 MG 12 hr tablet Take 1 tablet by mouth 2 (Two) Times a Day.      olopatadine (Pataday) 0.2 % solution ophthalmic solution       omeprazole (priLOSEC) 20 MG capsule Take 1 capsule by mouth Daily.      predniSONE (DELTASONE) 5 MG tablet Take 1 tablet by mouth Daily. (Patient taking  differently: Take 1 tablet by mouth Daily. Alternating 5 mg and 7.5 mg)      timolol (TIMOPTIC) 0.5 % ophthalmic solution APPLY 1 DROP BY OPHTHALMIC ROUTE TO BOTH EYES IN THE MORNING      losartan (COZAAR) 100 MG tablet TAKE 1 TABLET BY MOUTH EVERY DAY 90 tablet 1    montelukast (SINGULAIR) 10 MG tablet TAKE 1 TABLET BY MOUTH EVERY DAY 90 tablet 1    Tiadylt  MG 24 hr capsule TAKE 1 CAPSULE BY MOUTH DAILY. STOP AMLODIPINE 90 capsule 1    hydroxychloroquine (PLAQUENIL) 200 MG tablet Take  by mouth 2 (Two) Times a Day. (Patient not taking: Reported on 5/21/2025)       No facility-administered medications prior to visit.     No opioid medication identified on active medication list. I have reviewed chart for other potential  high risk medication/s and harmful drug interactions in the elderly.      Aspirin is not on active medication list.  Aspirin use is not indicated based on review of current medical condition/s. Risk of harm outweighs potential benefits.  .    Patient Active Problem List   Diagnosis    Allergic rhinitis    Asthma    Benign hypertension    Gastroesophageal reflux disease    Generalized osteoarthritis    Hyperlipidemia    Hypertensive heart disease    Prediabetes    Insomnia    Other long term (current) drug therapy    Osteopenia with high risk of fracture    Mixed stress and urge urinary incontinence    Dry eye syndrome of bilateral lacrimal glands    Bilateral pseudophakia    Hypermetropia, bilateral    Pseudophakia    Regular astigmatism, left eye    Presence of intraocular lens    Epiretinal membrane (ERM) of left eye    Puckering of macula, bilateral    Tear film insufficiency    Bruit of left carotid artery    Cystocele with rectocele    Palpitations    Bronchiectasis without complication    BMI 25.0-25.9,adult    Right sided weakness    Mitral annular calcification    TIA (transient ischemic attack)    Polyarthralgia    Pseudogout    Coronary artery calcification seen on CT scan  "    Advance Care Planning Advance Directive is on file.  ACP discussion was held with the patient during this visit. Patient has an advance directive in EMR which is still valid.             Objective   Vitals:    05/21/25 0908 05/21/25 0912 05/21/25 0939   BP: 145/72 150/80 128/80   BP Location: Left arm Right arm    Patient Position: Sitting Sitting    Cuff Size: Small Adult Small Adult    Pulse: 68     Resp: 16     Temp: 97.4 °F (36.3 °C)     TempSrc: Infrared     SpO2: 96%     Weight: 62.1 kg (137 lb)     Height: 157.5 cm (62\")     PainSc:  0-No pain        Estimated body mass index is 25.06 kg/m² as calculated from the following:    Height as of this encounter: 157.5 cm (62\").    Weight as of this encounter: 62.1 kg (137 lb).                Does the patient have evidence of cognitive impairment? No  Lab Results   Component Value Date    CHLPL 211 (H) 05/19/2025    TRIG 187 (H) 05/19/2025    HDL 63 05/19/2025     (H) 05/19/2025    VLDL 32 05/19/2025    HGBA1C 5.7 (H) 05/19/2025                                                                                                Health  Risk Assessment    Smoking Status:  Social History     Tobacco Use   Smoking Status Never    Passive exposure: Never   Smokeless Tobacco Never     Alcohol Consumption:  Social History     Substance and Sexual Activity   Alcohol Use No       Fall Risk Screen  STEADI Fall Risk Assessment was completed, and patient is at LOW risk for falls.Assessment completed on:5/21/2025    Depression Screening   Little interest or pleasure in doing things? Not at all   Feeling down, depressed, or hopeless? Not at all   PHQ-2 Total Score 0   Trouble falling or staying asleep, or sleeping too much? Not at all   Feeling tired or having little energy? Not at all   Poor appetite or overeating? Not at all   Feeling bad about yourself - or that you are a failure or have let yourself or your family down? Not at all   Trouble concentrating on things, such " as reading the newspaper or watching television? Not at all   Moving or speaking so slowly that other people could have noticed? Or the opposite - being so fidgety or restless that you have been moving around a lot more than usual? Not at all     Thoughts that you would be better off dead, or of hurting yourself in some way? Not at all   PHQ-9 Total Score 0   If you checked off any problems, how difficult have these problems made it for you to do your work, take care of things at home, or get along with other people? Not difficult at all        Health Habits and Functional and Cognitive Screenin/21/2025     9:00 AM   Functional & Cognitive Status   Do you have difficulty preparing food and eating? No   Do you have difficulty bathing yourself, getting dressed or grooming yourself? No   Do you have difficulty using the toilet? No   Do you have difficulty moving around from place to place? No   Do you have trouble with steps or getting out of a bed or a chair? No   Current Diet Well Balanced Diet   Dental Exam Up to date        Dental Exam Comment faulse teeth   Eye Exam Up to date   Exercise (times per week) 0 times per week   Current Exercises Include No Regular Exercise   Do you need help using the phone?  No   Are you deaf or do you have serious difficulty hearing?  No   Do you need help to go to places out of walking distance? No   Do you need help shopping? No   Do you need help preparing meals?  No   Do you need help with housework?  No   Do you need help with laundry? No   Do you need help taking your medications? No   Do you need help managing money? No   Do you ever drive or ride in a car without wearing a seat belt? No   Have you felt unusual stress, anger or loneliness in the last month? No   Who do you live with? Spouse   If you need help, do you have trouble finding someone available to you? No   Have you been bothered in the last four weeks by sexual problems? No   Do you have difficulty  concentrating, remembering or making decisions? No           Age-appropriate Screening Schedule:  Refer to the list below for future screening recommendations based on patient's age, sex and/or medical conditions. Orders for these recommended tests are listed in the plan section. The patient has been provided with a written plan.    Health Maintenance List  Health Maintenance   Topic Date Due    ZOSTER VACCINE (2 of 3) 03/20/2015    RSV Vaccine - Adults (1 - 1-dose 75+ series) Never done    COVID-19 Vaccine (4 - 2024-25 season) 09/01/2024    DXA SCAN  04/14/2025    COLORECTAL CANCER SCREENING  10/13/2025    INFLUENZA VACCINE  07/01/2025    TDAP/TD VACCINES (4 - Tdap) 01/14/2026    LIPID PANEL  05/19/2026    ANNUAL WELLNESS VISIT  05/21/2026    Pneumococcal Vaccine 50+  Completed                                                                                                                                                CMS Preventative Services Quick Reference  Risk Factors Identified During Encounter  Immunizations Discussed/Encouraged: Influenza and Prevnar 20 (Pneumococcal 20-valent conjugate)    The above risks/problems have been discussed with the patient.  Pertinent information has been shared with the patient in the After Visit Summary.  An After Visit Summary and PPPS were made available to the patient.    Follow Up:   Next Medicare Wellness visit to be scheduled in 1 year.         Additional E&M Note during same encounter follows:  Patient has additional, significant, and separately identifiable condition(s)/problem(s) that require work above and beyond the Medicare Wellness Visit     Chief Complaint  Medicare Wellness-subsequent, Hyperlipidemia, and Prediabetes    Subjective   HPI  Cara is also being seen today for additional medical problem/s.    Patient is here for a Medicare wellness as well as other health problems.  She has a history of hypertension, gout and polyarthritis.  She is compliant with her  "medications.  Also has had bronchiectasis and follows with pulmonary.  She is on chronic steroid therapy.  Overall other than feeling older she says she feels okay.      She reports that she has a hole in her septum in her nose.  She noticed that a while back.  She does not use any nasal sprays or has never had an injury.  She does occasionally get some drainage from her nose.  This is a new finding to her    Also has some cyst on her wrist she wants looked at today.  We discussed those in detail as well.       Mildly elevated calcium likely dietary.  Will see if we can add parathyroid labs.  Recheck in 2 months PTH and calcium      Cholesterol mildly elevated but stable.     A1c down to 5.7.  Watching her diet more closely.     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  She does need to have DEXA scan.  Is on chronic steroid therapy for lung disease.  She has been coughing a little more than normal.  Discussed the chest x-ray today        Review of Systems   Constitutional:  Negative for fatigue and fever.   HENT:  Negative for ear pain, postnasal drip and sinus pressure.         Septal perforation   Eyes:  Negative for visual disturbance.   Respiratory:  Positive for shortness of breath. Negative for cough.    Cardiovascular:  Negative for chest pain and palpitations.   Gastrointestinal:  Negative for abdominal pain, constipation, nausea and vomiting.   Genitourinary:  Negative for dysuria.   Musculoskeletal:  Positive for arthralgias.   Skin:  Negative for rash.   Allergic/Immunologic: Negative for environmental allergies.   Neurological:  Negative for dizziness.   Hematological:  Negative for adenopathy.   Psychiatric/Behavioral:  The patient is not nervous/anxious.               Objective   Vital Signs:  /80   Pulse 68   Temp 97.4 °F (36.3 °C) (Infrared)   Resp 16   Ht 157.5 cm (62\")   Wt 62.1 kg (137 lb)   SpO2 96%   BMI 25.06 kg/m²   Physical " Exam  Vitals and nursing note reviewed.   Constitutional:       Appearance: Normal appearance. She is well-developed.   HENT:      Head: Normocephalic and atraumatic.      Right Ear: Tympanic membrane, ear canal and external ear normal. No decreased hearing noted. No drainage, swelling or tenderness.      Left Ear: Tympanic membrane, ear canal and external ear normal. No decreased hearing noted. No drainage, swelling or tenderness.      Nose: Nose normal.      Right Sinus: No maxillary sinus tenderness or frontal sinus tenderness.      Left Sinus: No maxillary sinus tenderness or frontal sinus tenderness.      Comments: Right nare with a irregular lesion.  Left nare appears to have perforation through and through in the septum above the lesion     Mouth/Throat:      Mouth: Mucous membranes are moist.      Pharynx: Oropharynx is clear.   Eyes:      General: Lids are normal.      Conjunctiva/sclera: Conjunctivae normal.      Pupils: Pupils are equal, round, and reactive to light.   Neck:      Thyroid: No thyroid mass or thyromegaly.      Vascular: Normal carotid pulses. No carotid bruit.   Cardiovascular:      Rate and Rhythm: Regular rhythm.      Heart sounds: S1 normal and S2 normal. No murmur heard.     No friction rub. No gallop.   Pulmonary:      Effort: Pulmonary effort is normal.      Breath sounds: Normal breath sounds. No wheezing.   Chest:      Chest wall: No tenderness.   Abdominal:      General: Bowel sounds are normal. There is no distension.      Palpations: Abdomen is soft.      Tenderness: There is no abdominal tenderness.      Hernia: No hernia is present.   Musculoskeletal:         General: Normal range of motion.      Cervical back: Full passive range of motion without pain, normal range of motion and neck supple.      Comments: Some arthritic deformities of hands   Lymphadenopathy:      Head:      Right side of head: No submental, submandibular, tonsillar, preauricular or posterior auricular  adenopathy.      Left side of head: No submental, submandibular, tonsillar, preauricular or posterior auricular adenopathy.      Cervical: No cervical adenopathy.      Right cervical: No superficial cervical adenopathy.     Left cervical: No superficial cervical adenopathy.   Skin:     General: Skin is warm and dry.      Comments: Some joint deformity in hands.  No erythema in joints noted today on exam.   Neurological:      General: No focal deficit present.      Mental Status: She is alert and oriented to person, place, and time.      Cranial Nerves: No cranial nerve deficit.      Sensory: No sensory deficit.      Motor: Motor function is intact.      Coordination: Coordination is intact.      Gait: Gait is intact.      Deep Tendon Reflexes: Reflexes normal.   Psychiatric:         Attention and Perception: She is attentive.         Mood and Affect: Mood normal.         Speech: Speech normal.         Behavior: Behavior normal.         Thought Content: Thought content normal.         Cognition and Memory: Cognition normal.         Judgment: Judgment normal.                    Assessment and Plan      BMI 25.0-25.9,adult         Screening mammogram for breast cancer    Orders:    Mammo Screening Digital Tomosynthesis Bilateral With CAD    Subacute cough    Orders:    XR Chest PA & Lateral; Future    Menopause present    Orders:    DEXA Bone Density Axial; Future    Lesion of nasal septum    Orders:    Ambulatory Referral to ENT (Otolaryngology)    Nasal septum perforation    Orders:    Ambulatory Referral to ENT (Otolaryngology)    Other long term (current) drug therapy  Prednisone long-term therapy for lung disease         Allergic rhinitis, unspecified seasonality, unspecified trigger  Stable on current medication         Benign hypertension  Hypertension is stable and controlled  Continue current treatment regimen.  Blood pressure will be reassessed in 6 months.         Mixed hyperlipidemia   Lipid abnormalities  are stable           Mitral annular calcification  Relatively benign echo 2023         Coronary artery calcification seen on CT scan  Normal stress test 2024         Prediabetes  A1c in good control         Polyarthralgia  Currently stable.  Taking Plaquenil and sees rheumatology         TIA (transient ischemic attack)  Aspirin and risk management by controlling cholesterol         Bronchiectasis without complication  Follow-up with pulmonary         Right sided weakness         Medicare annual wellness visit, subsequent         Hypercalcemia                 Follow Up   Return in about 6 months (around 11/21/2025) for Recheck HTN.  Patient was given instructions and counseling regarding her condition or for health maintenance advice. Please see specific information pulled into the AVS if appropriate.

## 2025-05-21 NOTE — PROGRESS NOTES
May 21, 2025    Hello, may I speak with Cara Castañeda?    My name is ANY      I am  with [facility] [address]  [facility]  [address] [place] [address] [address]  [place] [address] [phone].    Before we get started may I verify your date of birth? 1945    I am calling to officially welcome you to our practice and ask about your recent visit. Is this a good time to talk? yes    Tell me about your visit with us. What things went well? NICE AND TIMELY         We're always looking for ways to make our patients' experiences even better. Do you have recommendations on ways we may improve?  no    Overall were you satisfied with your first visit to our practice? yes       I appreciate you taking the time to speak with me today. Is there anything else I can do for you? no      Thank you, and have a great day.

## 2025-06-04 ENCOUNTER — HOSPITAL ENCOUNTER (OUTPATIENT)
Dept: MAMMOGRAPHY | Facility: HOSPITAL | Age: 80
Discharge: HOME | End: 2025-06-04
Payer: MEDICARE

## 2025-06-04 ENCOUNTER — HOSPITAL ENCOUNTER (OUTPATIENT)
Dept: BONE DENSITY | Facility: HOSPITAL | Age: 80
Discharge: HOME | End: 2025-06-04
Payer: MEDICARE

## 2025-06-04 DIAGNOSIS — Z78.0 MENOPAUSE PRESENT: ICD-10-CM

## 2025-06-04 PROCEDURE — 77067 SCR MAMMO BI INCL CAD: CPT

## 2025-06-04 PROCEDURE — 77063 BREAST TOMOSYNTHESIS BI: CPT

## 2025-06-04 PROCEDURE — 77080 DXA BONE DENSITY AXIAL: CPT

## 2025-06-05 LAB
NCCN CRITERIA FLAG: NORMAL
TYRER CUZICK SCORE: 1

## 2025-06-16 ENCOUNTER — OFFICE VISIT (OUTPATIENT)
Dept: FAMILY MEDICINE CLINIC | Facility: CLINIC | Age: 80
End: 2025-06-16
Payer: MEDICARE

## 2025-06-16 VITALS
SYSTOLIC BLOOD PRESSURE: 154 MMHG | HEIGHT: 62 IN | RESPIRATION RATE: 16 BRPM | BODY MASS INDEX: 25.03 KG/M2 | OXYGEN SATURATION: 97 % | TEMPERATURE: 97.3 F | HEART RATE: 69 BPM | DIASTOLIC BLOOD PRESSURE: 84 MMHG | WEIGHT: 136 LBS

## 2025-06-16 DIAGNOSIS — M15.9 GENERALIZED OSTEOARTHRITIS: ICD-10-CM

## 2025-06-16 DIAGNOSIS — J34.89 NASAL SEPTUM PERFORATION: ICD-10-CM

## 2025-06-16 DIAGNOSIS — J34.89 LESION OF NASAL SEPTUM: ICD-10-CM

## 2025-06-16 DIAGNOSIS — M81.0 AGE-RELATED OSTEOPOROSIS WITHOUT CURRENT PATHOLOGICAL FRACTURE: Primary | ICD-10-CM

## 2025-06-16 PROCEDURE — 1159F MED LIST DOCD IN RCRD: CPT | Performed by: NURSE PRACTITIONER

## 2025-06-16 PROCEDURE — 99213 OFFICE O/P EST LOW 20 MIN: CPT | Performed by: NURSE PRACTITIONER

## 2025-06-16 PROCEDURE — G2211 COMPLEX E/M VISIT ADD ON: HCPCS | Performed by: NURSE PRACTITIONER

## 2025-06-16 PROCEDURE — 3077F SYST BP >= 140 MM HG: CPT | Performed by: NURSE PRACTITIONER

## 2025-06-16 PROCEDURE — 3079F DIAST BP 80-89 MM HG: CPT | Performed by: NURSE PRACTITIONER

## 2025-06-16 PROCEDURE — 1160F RVW MEDS BY RX/DR IN RCRD: CPT | Performed by: NURSE PRACTITIONER

## 2025-06-16 PROCEDURE — 1126F AMNT PAIN NOTED NONE PRSNT: CPT | Performed by: NURSE PRACTITIONER

## 2025-06-16 NOTE — PROGRESS NOTES
Chief Complaint  Osteopenia    Subjective          Cara Castañeda presents to [facility] for here to discuss osteoporosis osteopenia.    History of Present Illness      Patient is here to discuss risk and benefits of treatment of osteoporosis osteopenia.  She has multiple health problems.  She does not have any teeth issues.  Most recently she has a lesion with within her septum and perforation.  That needs to be biopsied and she is scheduled for that for August.  Discussed needing to have an EKG and labs ahead.  Advise she needs to continue to follow-up with clearance from cardiology if that is required by ENT.    Discussed risk and benefits of osteoporosis treatment at her age      Cara Castañeda  has a past medical history of Allergic rhinitis, Asthma, Bruit of left carotid artery (02/20/2020), Carotid stenosis, Cold sore, Gallstone pancreatitis (2006), GERD (gastroesophageal reflux disease), Hiatal hernia, Hyperlipidemia, Hypertension, Left ventricular hypertrophy, Osteoarthritis, Osteopenia with high risk of fracture, Other long term (current) drug therapy (01/29/2019), Pneumonia (March, April, May,2022), Pre-diabetes, Pseudogout, and Vocal cord nodule.      Review of Systems   Constitutional:  Positive for fatigue. Negative for chills, diaphoresis and fever.   HENT:  Negative for congestion and sore throat.    Respiratory:  Positive for cough.    Cardiovascular:  Negative for chest pain.   Gastrointestinal:  Negative for abdominal pain, nausea and vomiting.   Genitourinary:  Negative for dysuria.   Musculoskeletal:  Negative for myalgias and neck pain.   Skin:  Negative for rash.   Neurological:  Negative for weakness, numbness and headaches.        Objective       Current Outpatient Medications:     arformoterol (Brovana) 15 MCG/2ML nebulizer solution, Take 2 mL by nebulization 2 (Two) Times a Day. DX COPD J44.9, Disp: 120 mL, Rfl: 11    bimatoprost (Lumigan) 0.01 %  "ophthalmic drops, instill 1 drop by each eye route every evening, Disp: , Rfl:     brimonidine (ALPHAGAN) 0.2 % ophthalmic solution, , Disp: , Rfl:     budesonide (Pulmicort) 0.5 MG/2ML nebulizer solution, Take 2 mL by nebulization 2 (Two) Times a Day. DX J44.9, Disp: 360 each, Rfl: 1    budesonide-formoterol (SYMBICORT) 160-4.5 MCG/ACT inhaler, Inhale 2 puffs 2 (Two) Times a Day., Disp: 1 each, Rfl: 11    cetirizine (zyrTEC) 10 MG tablet, Take 1 tablet by mouth Daily., Disp: , Rfl:     dilTIAZem (Tiadylt ER) 120 MG 24 hr capsule, Take 1 capsule by mouth Daily., Disp: 90 capsule, Rfl: 1    guaiFENesin (MUCINEX) 600 MG 12 hr tablet, Take 1 tablet by mouth 2 (Two) Times a Day., Disp: , Rfl:     hydroxychloroquine (PLAQUENIL) 200 MG tablet, Take  by mouth 2 (Two) Times a Day. (Patient not taking: Reported on 5/21/2025), Disp: , Rfl:     losartan (COZAAR) 100 MG tablet, Take 1 tablet by mouth Daily., Disp: 90 tablet, Rfl: 1    montelukast (SINGULAIR) 10 MG tablet, Take 1 tablet by mouth Daily., Disp: 90 tablet, Rfl: 1    olopatadine (Pataday) 0.2 % solution ophthalmic solution, , Disp: , Rfl:     omeprazole (priLOSEC) 20 MG capsule, Take 1 capsule by mouth Daily., Disp: , Rfl:     predniSONE (DELTASONE) 5 MG tablet, Take 1 tablet by mouth Daily. (Patient taking differently: Take 1 tablet by mouth Daily. Alternating 5 mg and 7.5 mg), Disp: , Rfl:     timolol (TIMOPTIC) 0.5 % ophthalmic solution, APPLY 1 DROP BY OPHTHALMIC ROUTE TO BOTH EYES IN THE MORNING, Disp: , Rfl:     Vital Signs:      /84 (BP Location: Left arm, Patient Position: Sitting, Cuff Size: Small Adult)   Pulse 69   Temp 97.3 °F (36.3 °C) (Infrared)   Resp 16   Ht 157.5 cm (62\")   Wt 61.7 kg (136 lb)   SpO2 97%   BMI 24.87 kg/m²     Vitals:    06/16/25 0914   BP: 154/84   BP Location: Left arm   Patient Position: Sitting   Cuff Size: Small Adult   Pulse: 69   Resp: 16   Temp: 97.3 °F (36.3 °C)   TempSrc: Infrared   SpO2: 97%   Weight: 61.7 kg " "(136 lb)   Height: 157.5 cm (62\")       Physical Exam  Vitals and nursing note reviewed.   Constitutional:       Appearance: She is well-developed.   Cardiovascular:      Rate and Rhythm: Normal rate and regular rhythm.      Heart sounds: Normal heart sounds. No murmur heard.     No friction rub. No gallop.   Pulmonary:      Effort: Pulmonary effort is normal.      Breath sounds: Normal breath sounds. No wheezing or rales.   Skin:     General: Skin is warm and dry.   Neurological:      Mental Status: She is alert.   Psychiatric:         Mood and Affect: Mood normal.         Behavior: Behavior normal.         Thought Content: Thought content normal.         Judgment: Judgment normal.        Result Review :           Data reviewed : Radiologic studies dexascan       DATE OF EXAM:  6/4/2025 8:43 AM     PROCEDURE:  DEXA BONE DENSITY AXIAL-     INDICATIONS:  Postmenopausal, glucocorticosteroid use, caffeine intake, screening for  osteoporosis.     TECHNIQUE:  Lumbar vertebral and proximal femoral Dual-Energy X-ray Absorptiometry  (DEXA) was performed on the [employer] W.     FINDINGS:  A full detailed summary report from the DEXA scan performed is located  in the patient's chart in [facility].     The T-score for the lumbar spine is -0.9.     The T-score in the femoral neck is -2.3.  The T-score in the total hip is -2.5.     According to the World Health Organization criteria, this is classified  as osteoporotic.     Using the FRAX scores, which utilized risk factors and left femoral neck  bone density:     The 10 year probability of major osteoporotic fracture is 25% .  The 10 year probability of a hip fracture is 9.6%.           IMPRESSION:  Osteoporosis.     Copies of the computerized summary reports can be obtained from [facility] via  the health information department of [facility].       The PHQ has not been completed during this encounter.              Assessment and Plan    Diagnoses and all orders for this " visit:    1. Age-related osteoporosis without current pathological fracture (Primary)  Comments:  Discussed DEXA scan with patient in detail.  Elected not to do medication after discussion.  Discussed prevention    2. BMI 25.0-25.9,adult    3. Nasal septum perforation    4. Lesion of nasal septum  Comments:  Encouraged continue with surgery planned.    5. Generalized osteoarthritis         Problem List Items Addressed This Visit          Active Problems    Generalized osteoarthritis    BMI 25.0-25.9,adult     Other Visit Diagnoses         Age-related osteoporosis without current pathological fracture    -  Primary    Discussed DEXA scan with patient in detail.  Elected not to do medication after discussion.  Discussed prevention      Nasal septum perforation          Lesion of nasal septum        Encouraged continue with surgery planned.            Follow Up   Return for Next scheduled follow up.  Patient was given instructions and counseling regarding her condition or for health maintenance advice. Please see specific information pulled into the AVS if appropriate.

## 2025-07-22 ENCOUNTER — OFFICE VISIT (OUTPATIENT)
Dept: PULMONOLOGY | Facility: HOSPITAL | Age: 80
End: 2025-07-22
Payer: MEDICARE

## 2025-07-22 VITALS
WEIGHT: 136 LBS | SYSTOLIC BLOOD PRESSURE: 142 MMHG | DIASTOLIC BLOOD PRESSURE: 81 MMHG | RESPIRATION RATE: 14 BRPM | OXYGEN SATURATION: 99 % | HEART RATE: 69 BPM | BODY MASS INDEX: 25.03 KG/M2 | HEIGHT: 62 IN

## 2025-07-22 DIAGNOSIS — J45.30 MILD PERSISTENT ASTHMA WITHOUT COMPLICATION: Primary | ICD-10-CM

## 2025-07-22 DIAGNOSIS — R91.1 SOLITARY LUNG NODULE: ICD-10-CM

## 2025-07-22 DIAGNOSIS — J47.9 BRONCHIECTASIS WITHOUT COMPLICATION: ICD-10-CM

## 2025-07-22 DIAGNOSIS — F51.01 PRIMARY INSOMNIA: ICD-10-CM

## 2025-07-22 PROCEDURE — G0463 HOSPITAL OUTPT CLINIC VISIT: HCPCS

## 2025-07-22 NOTE — PROGRESS NOTES
SLEEP/PULMONARY  CLINIC NOTE      PATIENT IDENTIFICATION:  Name: Cara Castañeda  Age: 79 y.o.  Sex: female  :  1945  MRN: [id number]    DATE OF CONSULTATION:  2025     Referring physician:    Rocio Apodaca APRN            CHIEF COMPLAINT: Follow-up on asthma    History of Present Illness:   Cara Castañeda is a 79 y.o. female patient with asthma and chronic Bronchiectasis using bronchodilator steroid and albuterol as needed still having coughing intermittent clear to white sputum's no abscesses no fever no chills no change in her weight recently        Review of Systems:   Constitutional: As above   Eyes: negative   ENT/oropharynx: negative   Cardiovascular: negative   Respiratory: As above   Gastrointestinal: negative   Genitourinary: negative   Neurological: negative   Musculoskeletal: negative   Integument/breast: negative   Endocrine: negative   Allergic/Immunologic: negative     Past Medical History:  Past Medical History:   Diagnosis Date    Allergic rhinitis     on immunotherapy- Dr. Benito     Asthma     Dr. Benito    Asthma, extrinsic     Bruit of left carotid artery 2020    Carotid stenosis     <50%    Cold sore     Gallstone pancreatitis 2006    GERD (gastroesophageal reflux disease)     Dr. Oliva    Hiatal hernia     small, sliding     Hyperlipidemia     Hypertension     Left ventricular hypertrophy     Osteoarthritis     Dr. Avila    Osteopenia with high risk of fracture     Other long term (current) drug therapy 2019    Pneumonia March, April, May,2022    Pre-diabetes     Pseudogout     Stroke 2023    Vocal cord nodule     Dr. Nieves       Past Surgical History:  Past Surgical History:   Procedure Laterality Date    APPENDECTOMY      CHOLECYSTECTOMY  2006    Dr. Hernandez    ENDOSCOPY  2017    & Dilation. Dr. Oliva    ERCP  2006    Dr. Oliva    HYSTERECTOMY  1974    ovaries remain, for benign reasons    OTHER SURGICAL HISTORY      Stress Echo.  No ischemia. EF 60-65%.     TONSILLECTOMY AND ADENOIDECTOMY      TOTAL KNEE ARTHROPLASTY  08/31/2017        Family History:  Family History   Problem Relation Age of Onset    Hypertension Mother     Emphysema Mother     Hypertension Father     Heart disease Father     Osteoarthritis Father     Emphysema Father     Rheum arthritis Brother     Prostate cancer Paternal Grandfather     Asthma Granddaughter     Heart disease Brother     Hypertension Sister     Breast cancer Neg Hx     Ovarian cancer Neg Hx         Social History:   Social History     Tobacco Use    Smoking status: Never     Passive exposure: Never    Smokeless tobacco: Never   Substance Use Topics    Alcohol use: No        Allergies:  Allergies   Allergen Reactions    Aspirin Other (See Comments)     bruising    Crestor [Rosuvastatin Calcium] Myalgia    Levaquin [Levofloxacin] Myalgia     tendonitis    Albuterol Sulfate Anxiety    Atorvastatin Calcium Nausea Only    Sulfa Antibiotics Nausea Only    Tolterodine Nausea Only     Detrol       Home Meds:  (Not in a hospital admission)      Objective:    Vitals Ranges:   Heart Rate:  [69] 69  Resp:  [14] 14  BP: (142)/(81) 142/81  Body mass index is 24.87 kg/m².     Exam:  General Appearance:     HEENT:   without obvious abnormality,  Conjunctiva/corneas clear,  Normal external ear canals, no drainage    Clear orsalmucosa,  Mallampati score 3    Neck:  Supple, symmetrical, trachea midline. No JVD.  Lungs:   Bilateral basal rhonchi bilaterally, respirations unlabored symmetrical wall movement.    Chest wall:  No tenderness or deformity.    Heart:  Regular rate and rhythm, S1 and S2 normal.  Extremities: Trace edema no clubbing or Cyanosis        Data Review:  All labs (24hrs): No results found for this or any previous visit (from the past 24 hours).     Imaging:  Mammo Screening Digital Tomosynthesis Bilateral With CAD  Narrative: MAMMO SCREENING DIGITAL TOMOSYNTHESIS BILATERAL W CAD-     Date of Exam: 6/4/2025  8:14 AM     Indication: Screening. No breast complaints     Comparison: 5/16/2024.     Technique: Routine screening mammogram images were obtained per  protocol.  Tomosynthesis was utilized.  These mammographic images were  interpreted with the assistance of a computer aided detection system.     Breast Density: There are scattered areas of fibroglandular density.     Findings:   No suspicious masses, suspicious microcalcifications, or architectural  distortions are identified. There are stable scattered atherosclerotic  vascular occasions.     Impression: No mammographic evidence of malignancy.  Recommend annual screening  mammography.     BI-RADS ASSESSMENT: Category 1: Negative     Note: It has been reported that there is approximately a 15% false  negative rate in mammography.  Therefore, management of a palpable  abnormality should not be deferred because of a negative mammogram.     6/4/2025 8:52 AM by Abel Simms MD on Workstation: [id number]     DEXA Bone Density Axial  Narrative: DATE OF EXAM:  6/4/2025 8:43 AM     PROCEDURE:  DEXA BONE DENSITY AXIAL-     INDICATIONS:  Postmenopausal, glucocorticosteroid use, caffeine intake, screening for  osteoporosis.     TECHNIQUE:  Lumbar vertebral and proximal femoral Dual-Energy X-ray Absorptiometry  (DEXA) was performed on the [employer] W.     FINDINGS:  A full detailed summary report from the DEXA scan performed is located  in the patient's chart in [facility].     The T-score for the lumbar spine is -0.9.     The T-score in the femoral neck is -2.3.  The T-score in the total hip is -2.5.     According to the World Health Organization criteria, this is classified  as osteoporotic.     Using the FRAX scores, which utilized risk factors and left femoral neck  bone density:     The 10 year probability of major osteoporotic fracture is 25% .  The 10 year probability of a hip fracture is 9.6%.           Impression: Osteoporosis.     Copies of the computerized summary  reports can be obtained from "[employer] via  the health information department of [facility].        6/4/2025 8:48 AM by Abel Simms MD on Workstation: [id number]          ASSESSMENT:  Diagnoses and all orders for this visit:    Mild persistent asthma without complication    Bronchiectasis without complication    Primary insomnia    Solitary lung nodule  -     CT Chest Without Contrast Diagnostic; Future        PLAN:  CT scan follow-up on the lung nodule    Bronchodilator inhaled corticosteroid    Education how to use inhalers    Encouraged to use incentive spirometer    Continue to exercise slowly as tolerated    Monitor for any change in the color of the sputum    Avoid any exposure to fumes, gas or any irritant    Set a bedtime that is early enough for you to get at least 7 hours of sleep.  Don’t go to bed unless you are sleepy.  If you don’t fall asleep after 20 minutes, get out of bed.  Establish a relaxing bedtime routine.  Use your bed only for sleep and sex.  Make your bedroom quiet and relaxing. Keep the room at a comfortable, cool temperature.  Limit exposure to bright light in the evenings.  Turn off electronic devices at least 30 minutes before bedtime.  Don’t eat a large meal before bedtime. If you are hungry at night, eat a light, healthy snack.  Exercise regularly and maintain a healthy diet.  Avoid consuming caffeine in the late afternoon or evening.  Avoid consuming alcohol before bedtime.  Reduce your fluid intake before bedtime.  Avoid naps during the day time.     Follow-up 3 months    Natty Hodge MD. D, ABSM.  7/22/2025  11:03 EDT

## 2025-08-01 PROCEDURE — 88305 TISSUE EXAM BY PATHOLOGIST: CPT | Performed by: OTOLARYNGOLOGY

## 2025-08-04 ENCOUNTER — LAB REQUISITION (OUTPATIENT)
Dept: LAB | Facility: HOSPITAL | Age: 80
End: 2025-08-04
Payer: MEDICARE

## 2025-08-04 DIAGNOSIS — J34.89 OTHER SPECIFIED DISORDERS OF NOSE AND NASAL SINUSES: ICD-10-CM

## 2025-08-05 LAB
LAB AP CASE REPORT: NORMAL
LAB AP DIAGNOSIS COMMENT: NORMAL
PATH REPORT.FINAL DX SPEC: NORMAL
PATH REPORT.GROSS SPEC: NORMAL